# Patient Record
Sex: FEMALE | Race: WHITE | NOT HISPANIC OR LATINO | Employment: OTHER | URBAN - METROPOLITAN AREA
[De-identification: names, ages, dates, MRNs, and addresses within clinical notes are randomized per-mention and may not be internally consistent; named-entity substitution may affect disease eponyms.]

---

## 2017-01-09 ENCOUNTER — COMMUNICATION - HEALTHEAST (OUTPATIENT)
Dept: ADMINISTRATIVE | Facility: CLINIC | Age: 72
End: 2017-01-09

## 2017-01-10 ENCOUNTER — AMBULATORY - HEALTHEAST (OUTPATIENT)
Dept: OTOLARYNGOLOGY | Facility: CLINIC | Age: 72
End: 2017-01-10

## 2017-01-10 DIAGNOSIS — R42 VERTIGO: ICD-10-CM

## 2017-01-18 ENCOUNTER — RECORDS - HEALTHEAST (OUTPATIENT)
Dept: BONE DENSITY | Facility: CLINIC | Age: 72
End: 2017-01-18

## 2017-01-18 ENCOUNTER — RECORDS - HEALTHEAST (OUTPATIENT)
Dept: ADMINISTRATIVE | Facility: OTHER | Age: 72
End: 2017-01-18

## 2017-01-18 DIAGNOSIS — Z13.820 ENCOUNTER FOR SCREENING FOR OSTEOPOROSIS: ICD-10-CM

## 2017-03-23 ENCOUNTER — COMMUNICATION - HEALTHEAST (OUTPATIENT)
Dept: OTOLARYNGOLOGY | Facility: CLINIC | Age: 72
End: 2017-03-23

## 2017-03-23 DIAGNOSIS — R42 VERTIGO: ICD-10-CM

## 2017-04-25 ENCOUNTER — COMMUNICATION - HEALTHEAST (OUTPATIENT)
Dept: OTOLARYNGOLOGY | Facility: CLINIC | Age: 72
End: 2017-04-25

## 2017-04-25 DIAGNOSIS — R42 VERTIGO: ICD-10-CM

## 2017-06-07 ENCOUNTER — RECORDS - HEALTHEAST (OUTPATIENT)
Dept: ADMINISTRATIVE | Facility: OTHER | Age: 72
End: 2017-06-07

## 2017-06-08 ENCOUNTER — AMBULATORY - HEALTHEAST (OUTPATIENT)
Dept: ADMINISTRATIVE | Facility: REHABILITATION | Age: 72
End: 2017-06-08

## 2017-06-08 DIAGNOSIS — R42 DIZZINESS: ICD-10-CM

## 2017-06-19 ENCOUNTER — OFFICE VISIT - HEALTHEAST (OUTPATIENT)
Dept: PHYSICAL THERAPY | Facility: REHABILITATION | Age: 72
End: 2017-06-19

## 2017-06-19 ENCOUNTER — COMMUNICATION - HEALTHEAST (OUTPATIENT)
Dept: TELEHEALTH | Facility: CLINIC | Age: 72
End: 2017-06-19

## 2017-06-19 DIAGNOSIS — H81.10 BPPV (BENIGN PAROXYSMAL POSITIONAL VERTIGO), UNSPECIFIED LATERALITY: ICD-10-CM

## 2017-06-19 DIAGNOSIS — R42 DIZZINESS: ICD-10-CM

## 2017-06-19 DIAGNOSIS — R26.81 UNSTEADY GAIT: ICD-10-CM

## 2017-06-20 ENCOUNTER — COMMUNICATION - HEALTHEAST (OUTPATIENT)
Dept: FAMILY MEDICINE | Facility: CLINIC | Age: 72
End: 2017-06-20

## 2017-08-01 ENCOUNTER — RECORDS - HEALTHEAST (OUTPATIENT)
Dept: ADMINISTRATIVE | Facility: OTHER | Age: 72
End: 2017-08-01

## 2017-08-02 ENCOUNTER — COMMUNICATION - HEALTHEAST (OUTPATIENT)
Dept: FAMILY MEDICINE | Facility: CLINIC | Age: 72
End: 2017-08-02

## 2017-08-02 ENCOUNTER — OFFICE VISIT - HEALTHEAST (OUTPATIENT)
Dept: FAMILY MEDICINE | Facility: CLINIC | Age: 72
End: 2017-08-02

## 2017-08-02 DIAGNOSIS — M25.571 RIGHT ANKLE PAIN: ICD-10-CM

## 2017-08-02 DIAGNOSIS — F41.9 ANXIETY: ICD-10-CM

## 2017-08-02 DIAGNOSIS — Z00.00 WELLNESS EXAMINATION: ICD-10-CM

## 2017-08-02 DIAGNOSIS — E78.5 DYSLIPIDEMIA: ICD-10-CM

## 2017-08-02 DIAGNOSIS — R06.09 EXERTIONAL DYSPNEA: ICD-10-CM

## 2017-08-02 DIAGNOSIS — F39 MOOD DISORDER (H): ICD-10-CM

## 2017-08-02 LAB
CHOLEST SERPL-MCNC: 205 MG/DL
FASTING STATUS PATIENT QL REPORTED: YES
HDLC SERPL-MCNC: 43 MG/DL
LDLC SERPL CALC-MCNC: 116 MG/DL
TRIGL SERPL-MCNC: 231 MG/DL

## 2017-08-02 ASSESSMENT — MIFFLIN-ST. JEOR: SCORE: 1116.17

## 2017-10-23 ENCOUNTER — COMMUNICATION - HEALTHEAST (OUTPATIENT)
Dept: FAMILY MEDICINE | Facility: CLINIC | Age: 72
End: 2017-10-23

## 2017-11-09 ENCOUNTER — AMBULATORY - HEALTHEAST (OUTPATIENT)
Dept: NURSING | Facility: CLINIC | Age: 72
End: 2017-11-09

## 2017-11-09 DIAGNOSIS — Z23 NEED FOR IMMUNIZATION AGAINST INFLUENZA: ICD-10-CM

## 2017-11-23 ENCOUNTER — AMBULATORY - HEALTHEAST (OUTPATIENT)
Dept: FAMILY MEDICINE | Facility: CLINIC | Age: 72
End: 2017-11-23

## 2017-11-23 DIAGNOSIS — Z12.31 VISIT FOR SCREENING MAMMOGRAM: ICD-10-CM

## 2017-12-19 ENCOUNTER — HOSPITAL ENCOUNTER (OUTPATIENT)
Dept: MAMMOGRAPHY | Facility: CLINIC | Age: 72
Discharge: HOME | End: 2017-12-19
Attending: FAMILY MEDICINE

## 2017-12-19 DIAGNOSIS — Z12.31 VISIT FOR SCREENING MAMMOGRAM: ICD-10-CM

## 2018-04-09 ENCOUNTER — OFFICE VISIT - HEALTHEAST (OUTPATIENT)
Dept: FAMILY MEDICINE | Facility: CLINIC | Age: 73
End: 2018-04-09

## 2018-04-09 DIAGNOSIS — R07.0 THROAT PAIN: ICD-10-CM

## 2018-04-09 DIAGNOSIS — R03.0 ELEVATED BLOOD PRESSURE READING: ICD-10-CM

## 2018-04-09 LAB — DEPRECATED S PYO AG THROAT QL EIA: NORMAL

## 2018-04-10 LAB — GROUP A STREP BY PCR: NORMAL

## 2018-05-15 ENCOUNTER — OFFICE VISIT - HEALTHEAST (OUTPATIENT)
Dept: OTOLARYNGOLOGY | Facility: CLINIC | Age: 73
End: 2018-05-15

## 2018-05-15 DIAGNOSIS — R42 VERTIGO: ICD-10-CM

## 2018-05-22 ENCOUNTER — OFFICE VISIT - HEALTHEAST (OUTPATIENT)
Dept: OTOLARYNGOLOGY | Facility: CLINIC | Age: 73
End: 2018-05-22

## 2018-05-22 ENCOUNTER — OFFICE VISIT - HEALTHEAST (OUTPATIENT)
Dept: AUDIOLOGY | Facility: CLINIC | Age: 73
End: 2018-05-22

## 2018-05-22 DIAGNOSIS — H90.5 SENSORINEURAL HEARING LOSS, UNILATERAL: ICD-10-CM

## 2018-05-22 DIAGNOSIS — H90.3 ASNHL (ASYMMETRICAL SENSORINEURAL HEARING LOSS): ICD-10-CM

## 2018-05-22 DIAGNOSIS — H90.8 MIXED HEARING LOSS, UNILATERAL: ICD-10-CM

## 2018-05-22 DIAGNOSIS — R42 DIZZINESS AND GIDDINESS: ICD-10-CM

## 2018-05-22 DIAGNOSIS — H81.12 BPPV (BENIGN PAROXYSMAL POSITIONAL VERTIGO), LEFT: ICD-10-CM

## 2018-05-29 ENCOUNTER — OFFICE VISIT - HEALTHEAST (OUTPATIENT)
Dept: OCCUPATIONAL THERAPY | Facility: REHABILITATION | Age: 73
End: 2018-05-29

## 2018-05-29 DIAGNOSIS — R42 VERTIGO: ICD-10-CM

## 2018-05-29 DIAGNOSIS — H81.12 BPPV (BENIGN PAROXYSMAL POSITIONAL VERTIGO), LEFT: ICD-10-CM

## 2018-05-29 DIAGNOSIS — R26.81 UNSTEADINESS ON FEET: ICD-10-CM

## 2018-06-01 ENCOUNTER — OFFICE VISIT - HEALTHEAST (OUTPATIENT)
Dept: OCCUPATIONAL THERAPY | Facility: REHABILITATION | Age: 73
End: 2018-06-01

## 2018-06-01 ENCOUNTER — OFFICE VISIT - HEALTHEAST (OUTPATIENT)
Dept: FAMILY MEDICINE | Facility: CLINIC | Age: 73
End: 2018-06-01

## 2018-06-01 DIAGNOSIS — R26.81 UNSTEADINESS ON FEET: ICD-10-CM

## 2018-06-01 DIAGNOSIS — H81.12 BPPV (BENIGN PAROXYSMAL POSITIONAL VERTIGO), LEFT: ICD-10-CM

## 2018-06-01 DIAGNOSIS — R42 VERTIGO: ICD-10-CM

## 2018-06-01 DIAGNOSIS — J02.9 SORE THROAT: ICD-10-CM

## 2018-06-01 LAB — DEPRECATED S PYO AG THROAT QL EIA: NORMAL

## 2018-06-02 LAB — GROUP A STREP BY PCR: NORMAL

## 2018-06-12 ENCOUNTER — OFFICE VISIT - HEALTHEAST (OUTPATIENT)
Dept: AUDIOLOGY | Facility: CLINIC | Age: 73
End: 2018-06-12

## 2018-06-12 DIAGNOSIS — H90.A32 MIXED CONDUCTIVE AND SENSORINEURAL HEARING LOSS OF LEFT EAR WITH RESTRICTED HEARING OF RIGHT EAR: ICD-10-CM

## 2018-06-12 DIAGNOSIS — H90.A21 SENSORINEURAL HEARING LOSS (SNHL) OF RIGHT EAR WITH RESTRICTED HEARING OF LEFT EAR: ICD-10-CM

## 2018-06-13 ENCOUNTER — COMMUNICATION - HEALTHEAST (OUTPATIENT)
Dept: ADMINISTRATIVE | Facility: CLINIC | Age: 73
End: 2018-06-13

## 2018-09-18 ENCOUNTER — COMMUNICATION - HEALTHEAST (OUTPATIENT)
Dept: FAMILY MEDICINE | Facility: CLINIC | Age: 73
End: 2018-09-18

## 2018-09-18 DIAGNOSIS — F39 MOOD DISORDER (H): ICD-10-CM

## 2018-09-18 DIAGNOSIS — E78.5 DYSLIPIDEMIA: ICD-10-CM

## 2018-09-18 DIAGNOSIS — F41.9 ANXIETY: ICD-10-CM

## 2018-09-25 ENCOUNTER — RECORDS - HEALTHEAST (OUTPATIENT)
Dept: ADMINISTRATIVE | Facility: OTHER | Age: 73
End: 2018-09-25

## 2018-10-08 ENCOUNTER — AMBULATORY - HEALTHEAST (OUTPATIENT)
Dept: NURSING | Facility: CLINIC | Age: 73
End: 2018-10-08

## 2018-10-08 DIAGNOSIS — Z23 NEED FOR IMMUNIZATION AGAINST INFLUENZA: ICD-10-CM

## 2019-01-31 ENCOUNTER — OFFICE VISIT - HEALTHEAST (OUTPATIENT)
Dept: FAMILY MEDICINE | Facility: CLINIC | Age: 74
End: 2019-01-31

## 2019-01-31 DIAGNOSIS — I10 ESSENTIAL HYPERTENSION: ICD-10-CM

## 2019-01-31 DIAGNOSIS — E78.5 DYSLIPIDEMIA: ICD-10-CM

## 2019-01-31 DIAGNOSIS — R73.09 ELEVATED GLUCOSE: ICD-10-CM

## 2019-01-31 DIAGNOSIS — R04.0 BLOODY NOSE: ICD-10-CM

## 2019-01-31 LAB
ALBUMIN SERPL-MCNC: 4.1 G/DL (ref 3.5–5)
ALP SERPL-CCNC: 73 U/L (ref 45–120)
ALT SERPL W P-5'-P-CCNC: 24 U/L (ref 0–45)
ANION GAP SERPL CALCULATED.3IONS-SCNC: 12 MMOL/L (ref 5–18)
AST SERPL W P-5'-P-CCNC: 20 U/L (ref 0–40)
BASOPHILS # BLD AUTO: 0 THOU/UL (ref 0–0.2)
BASOPHILS NFR BLD AUTO: 1 % (ref 0–2)
BILIRUB SERPL-MCNC: 0.7 MG/DL (ref 0–1)
BUN SERPL-MCNC: 15 MG/DL (ref 8–28)
CALCIUM SERPL-MCNC: 9.4 MG/DL (ref 8.5–10.5)
CHLORIDE BLD-SCNC: 106 MMOL/L (ref 98–107)
CHOLEST SERPL-MCNC: 241 MG/DL
CO2 SERPL-SCNC: 23 MMOL/L (ref 22–31)
CREAT SERPL-MCNC: 0.78 MG/DL (ref 0.6–1.1)
EOSINOPHIL # BLD AUTO: 0.1 THOU/UL (ref 0–0.4)
EOSINOPHIL NFR BLD AUTO: 2 % (ref 0–6)
ERYTHROCYTE [DISTWIDTH] IN BLOOD BY AUTOMATED COUNT: 11.9 % (ref 11–14.5)
FASTING STATUS PATIENT QL REPORTED: YES
GFR SERPL CREATININE-BSD FRML MDRD: >60 ML/MIN/1.73M2
GLUCOSE BLD-MCNC: 99 MG/DL (ref 70–125)
HBA1C MFR BLD: 5 % (ref 3.5–6.1)
HCT VFR BLD AUTO: 39.6 % (ref 35–47)
HDLC SERPL-MCNC: 61 MG/DL
HGB BLD-MCNC: 13.4 G/DL (ref 12–16)
LDLC SERPL CALC-MCNC: 144 MG/DL
LYMPHOCYTES # BLD AUTO: 1.8 THOU/UL (ref 0.8–4.4)
LYMPHOCYTES NFR BLD AUTO: 35 % (ref 20–40)
MCH RBC QN AUTO: 32.4 PG (ref 27–34)
MCHC RBC AUTO-ENTMCNC: 33.8 G/DL (ref 32–36)
MCV RBC AUTO: 96 FL (ref 80–100)
MONOCYTES # BLD AUTO: 0.3 THOU/UL (ref 0–0.9)
MONOCYTES NFR BLD AUTO: 6 % (ref 2–10)
NEUTROPHILS # BLD AUTO: 3 THOU/UL (ref 2–7.7)
NEUTROPHILS NFR BLD AUTO: 57 % (ref 50–70)
PLATELET # BLD AUTO: 260 THOU/UL (ref 140–440)
PMV BLD AUTO: 6.9 FL (ref 7–10)
POTASSIUM BLD-SCNC: 4 MMOL/L (ref 3.5–5)
PROT SERPL-MCNC: 6.7 G/DL (ref 6–8)
RBC # BLD AUTO: 4.13 MILL/UL (ref 3.8–5.4)
SODIUM SERPL-SCNC: 141 MMOL/L (ref 136–145)
TRIGL SERPL-MCNC: 180 MG/DL
WBC: 5.3 THOU/UL (ref 4–11)

## 2019-02-04 ENCOUNTER — COMMUNICATION - HEALTHEAST (OUTPATIENT)
Dept: FAMILY MEDICINE | Facility: CLINIC | Age: 74
End: 2019-02-04

## 2019-02-18 ENCOUNTER — OFFICE VISIT - HEALTHEAST (OUTPATIENT)
Dept: FAMILY MEDICINE | Facility: CLINIC | Age: 74
End: 2019-02-18

## 2019-02-18 DIAGNOSIS — I10 ESSENTIAL HYPERTENSION: ICD-10-CM

## 2019-02-26 ENCOUNTER — OFFICE VISIT - HEALTHEAST (OUTPATIENT)
Dept: FAMILY MEDICINE | Facility: CLINIC | Age: 74
End: 2019-02-26

## 2019-02-26 ENCOUNTER — COMMUNICATION - HEALTHEAST (OUTPATIENT)
Dept: SCHEDULING | Facility: CLINIC | Age: 74
End: 2019-02-26

## 2019-02-26 DIAGNOSIS — E78.5 DYSLIPIDEMIA: ICD-10-CM

## 2019-02-26 DIAGNOSIS — I10 ESSENTIAL HYPERTENSION: ICD-10-CM

## 2019-02-26 DIAGNOSIS — R05.9 COUGH: ICD-10-CM

## 2019-02-26 LAB
ANION GAP SERPL CALCULATED.3IONS-SCNC: 13 MMOL/L (ref 5–18)
BUN SERPL-MCNC: 14 MG/DL (ref 8–28)
CALCIUM SERPL-MCNC: 9.5 MG/DL (ref 8.5–10.5)
CHLORIDE BLD-SCNC: 105 MMOL/L (ref 98–107)
CHOLEST SERPL-MCNC: 226 MG/DL
CO2 SERPL-SCNC: 22 MMOL/L (ref 22–31)
CREAT SERPL-MCNC: 0.87 MG/DL (ref 0.6–1.1)
FASTING STATUS PATIENT QL REPORTED: YES
GFR SERPL CREATININE-BSD FRML MDRD: >60 ML/MIN/1.73M2
GLUCOSE BLD-MCNC: 109 MG/DL (ref 70–125)
HDLC SERPL-MCNC: 50 MG/DL
LDLC SERPL CALC-MCNC: 126 MG/DL
POTASSIUM BLD-SCNC: 3.5 MMOL/L (ref 3.5–5)
SODIUM SERPL-SCNC: 140 MMOL/L (ref 136–145)
TRIGL SERPL-MCNC: 249 MG/DL

## 2019-02-27 ENCOUNTER — COMMUNICATION - HEALTHEAST (OUTPATIENT)
Dept: FAMILY MEDICINE | Facility: CLINIC | Age: 74
End: 2019-02-27

## 2019-03-04 ENCOUNTER — OFFICE VISIT - HEALTHEAST (OUTPATIENT)
Dept: FAMILY MEDICINE | Facility: CLINIC | Age: 74
End: 2019-03-04

## 2019-03-04 DIAGNOSIS — R41.0 CONFUSION: ICD-10-CM

## 2019-03-04 DIAGNOSIS — I10 ESSENTIAL HYPERTENSION: ICD-10-CM

## 2019-03-04 DIAGNOSIS — R43.9 DECREASED TASTE AND SMELL: ICD-10-CM

## 2019-03-08 ENCOUNTER — HOSPITAL ENCOUNTER (OUTPATIENT)
Dept: MRI IMAGING | Facility: CLINIC | Age: 74
Discharge: HOME | End: 2019-03-08
Attending: FAMILY MEDICINE

## 2019-03-08 DIAGNOSIS — R41.0 CONFUSION: ICD-10-CM

## 2019-03-08 DIAGNOSIS — R43.9 DECREASED TASTE AND SMELL: ICD-10-CM

## 2019-04-03 ENCOUNTER — COMMUNICATION - HEALTHEAST (OUTPATIENT)
Dept: FAMILY MEDICINE | Facility: CLINIC | Age: 74
End: 2019-04-03

## 2019-04-03 DIAGNOSIS — I10 ESSENTIAL HYPERTENSION: ICD-10-CM

## 2019-04-03 DIAGNOSIS — F39 MOOD DISORDER (H): ICD-10-CM

## 2019-04-03 DIAGNOSIS — F41.9 ANXIETY: ICD-10-CM

## 2019-04-03 DIAGNOSIS — E78.5 DYSLIPIDEMIA: ICD-10-CM

## 2019-04-04 ENCOUNTER — COMMUNICATION - HEALTHEAST (OUTPATIENT)
Dept: FAMILY MEDICINE | Facility: CLINIC | Age: 74
End: 2019-04-04

## 2019-04-04 DIAGNOSIS — I10 ESSENTIAL HYPERTENSION: ICD-10-CM

## 2019-06-30 ENCOUNTER — COMMUNICATION - HEALTHEAST (OUTPATIENT)
Dept: FAMILY MEDICINE | Facility: CLINIC | Age: 74
End: 2019-06-30

## 2019-06-30 DIAGNOSIS — I10 ESSENTIAL HYPERTENSION: ICD-10-CM

## 2019-07-01 ENCOUNTER — COMMUNICATION - HEALTHEAST (OUTPATIENT)
Dept: FAMILY MEDICINE | Facility: CLINIC | Age: 74
End: 2019-07-01

## 2019-07-01 DIAGNOSIS — F39 MOOD DISORDER (H): ICD-10-CM

## 2019-07-01 DIAGNOSIS — E78.5 DYSLIPIDEMIA: ICD-10-CM

## 2019-07-01 DIAGNOSIS — F41.9 ANXIETY: ICD-10-CM

## 2019-10-18 ENCOUNTER — AMBULATORY - HEALTHEAST (OUTPATIENT)
Dept: NURSING | Facility: CLINIC | Age: 74
End: 2019-10-18

## 2019-10-18 DIAGNOSIS — Z23 FLU VACCINE NEED: ICD-10-CM

## 2019-11-18 ENCOUNTER — RECORDS - HEALTHEAST (OUTPATIENT)
Dept: GENERAL RADIOLOGY | Facility: CLINIC | Age: 74
End: 2019-11-18

## 2019-11-18 ENCOUNTER — OFFICE VISIT - HEALTHEAST (OUTPATIENT)
Dept: FAMILY MEDICINE | Facility: CLINIC | Age: 74
End: 2019-11-18

## 2019-11-18 DIAGNOSIS — M79.671 PAIN OF RIGHT HEEL: ICD-10-CM

## 2019-11-18 DIAGNOSIS — Z12.31 VISIT FOR SCREENING MAMMOGRAM: ICD-10-CM

## 2019-11-18 DIAGNOSIS — M25.512 ACUTE PAIN OF LEFT SHOULDER: ICD-10-CM

## 2019-11-18 DIAGNOSIS — I10 ESSENTIAL HYPERTENSION: ICD-10-CM

## 2019-11-18 DIAGNOSIS — M79.671 PAIN IN RIGHT FOOT: ICD-10-CM

## 2019-11-18 DIAGNOSIS — F39 MOOD DISORDER (H): ICD-10-CM

## 2019-11-18 ASSESSMENT — MIFFLIN-ST. JEOR: SCORE: 1124.68

## 2019-11-19 ENCOUNTER — COMMUNICATION - HEALTHEAST (OUTPATIENT)
Dept: FAMILY MEDICINE | Facility: CLINIC | Age: 74
End: 2019-11-19

## 2019-11-25 ENCOUNTER — OFFICE VISIT - HEALTHEAST (OUTPATIENT)
Dept: PODIATRY | Facility: CLINIC | Age: 74
End: 2019-11-25

## 2019-11-25 DIAGNOSIS — M24.573 EQUINUS CONTRACTURE OF ANKLE: ICD-10-CM

## 2019-11-25 DIAGNOSIS — M72.2 PLANTAR FASCIITIS: ICD-10-CM

## 2019-12-03 ENCOUNTER — OFFICE VISIT - HEALTHEAST (OUTPATIENT)
Dept: PHYSICAL THERAPY | Facility: REHABILITATION | Age: 74
End: 2019-12-03

## 2019-12-03 DIAGNOSIS — M75.42 IMPINGEMENT SYNDROME OF LEFT SHOULDER: ICD-10-CM

## 2019-12-03 DIAGNOSIS — M53.82 DECREASED ROM OF INTERVERTEBRAL DISCS OF CERVICAL SPINE: ICD-10-CM

## 2019-12-03 DIAGNOSIS — M25.612 DECREASED ROM OF LEFT SHOULDER: ICD-10-CM

## 2019-12-03 DIAGNOSIS — M25.512 ACUTE PAIN OF LEFT SHOULDER: ICD-10-CM

## 2019-12-05 ENCOUNTER — OFFICE VISIT - HEALTHEAST (OUTPATIENT)
Dept: PHYSICAL THERAPY | Facility: REHABILITATION | Age: 74
End: 2019-12-05

## 2019-12-05 DIAGNOSIS — M53.82 DECREASED ROM OF INTERVERTEBRAL DISCS OF CERVICAL SPINE: ICD-10-CM

## 2019-12-05 DIAGNOSIS — M25.512 ACUTE PAIN OF LEFT SHOULDER: ICD-10-CM

## 2019-12-05 DIAGNOSIS — M75.42 IMPINGEMENT SYNDROME OF LEFT SHOULDER: ICD-10-CM

## 2019-12-05 DIAGNOSIS — M25.612 DECREASED ROM OF LEFT SHOULDER: ICD-10-CM

## 2019-12-17 ENCOUNTER — OFFICE VISIT - HEALTHEAST (OUTPATIENT)
Dept: PHYSICAL THERAPY | Facility: REHABILITATION | Age: 74
End: 2019-12-17

## 2019-12-17 DIAGNOSIS — M53.82 DECREASED ROM OF INTERVERTEBRAL DISCS OF CERVICAL SPINE: ICD-10-CM

## 2019-12-17 DIAGNOSIS — M25.512 ACUTE PAIN OF LEFT SHOULDER: ICD-10-CM

## 2019-12-17 DIAGNOSIS — M75.42 IMPINGEMENT SYNDROME OF LEFT SHOULDER: ICD-10-CM

## 2019-12-17 DIAGNOSIS — M25.612 DECREASED ROM OF LEFT SHOULDER: ICD-10-CM

## 2019-12-19 ENCOUNTER — OFFICE VISIT - HEALTHEAST (OUTPATIENT)
Dept: PHYSICAL THERAPY | Facility: REHABILITATION | Age: 74
End: 2019-12-19

## 2019-12-19 DIAGNOSIS — M25.512 ACUTE PAIN OF LEFT SHOULDER: ICD-10-CM

## 2019-12-19 DIAGNOSIS — M25.612 DECREASED ROM OF LEFT SHOULDER: ICD-10-CM

## 2019-12-19 DIAGNOSIS — M53.82 DECREASED ROM OF INTERVERTEBRAL DISCS OF CERVICAL SPINE: ICD-10-CM

## 2019-12-19 DIAGNOSIS — M75.42 IMPINGEMENT SYNDROME OF LEFT SHOULDER: ICD-10-CM

## 2019-12-26 ENCOUNTER — OFFICE VISIT - HEALTHEAST (OUTPATIENT)
Dept: PHYSICAL THERAPY | Facility: REHABILITATION | Age: 74
End: 2019-12-26

## 2019-12-26 DIAGNOSIS — M53.82 DECREASED ROM OF INTERVERTEBRAL DISCS OF CERVICAL SPINE: ICD-10-CM

## 2019-12-26 DIAGNOSIS — M25.612 DECREASED ROM OF LEFT SHOULDER: ICD-10-CM

## 2019-12-26 DIAGNOSIS — M25.512 ACUTE PAIN OF LEFT SHOULDER: ICD-10-CM

## 2019-12-26 DIAGNOSIS — M75.42 IMPINGEMENT SYNDROME OF LEFT SHOULDER: ICD-10-CM

## 2019-12-30 ENCOUNTER — COMMUNICATION - HEALTHEAST (OUTPATIENT)
Dept: FAMILY MEDICINE | Facility: CLINIC | Age: 74
End: 2019-12-30

## 2019-12-30 DIAGNOSIS — I10 ESSENTIAL HYPERTENSION: ICD-10-CM

## 2020-01-02 ENCOUNTER — OFFICE VISIT - HEALTHEAST (OUTPATIENT)
Dept: PHYSICAL THERAPY | Facility: REHABILITATION | Age: 75
End: 2020-01-02

## 2020-01-02 DIAGNOSIS — M75.42 IMPINGEMENT SYNDROME OF LEFT SHOULDER: ICD-10-CM

## 2020-01-02 DIAGNOSIS — M53.82 DECREASED ROM OF INTERVERTEBRAL DISCS OF CERVICAL SPINE: ICD-10-CM

## 2020-01-02 DIAGNOSIS — M25.512 ACUTE PAIN OF LEFT SHOULDER: ICD-10-CM

## 2020-01-02 DIAGNOSIS — M25.612 DECREASED ROM OF LEFT SHOULDER: ICD-10-CM

## 2020-01-07 ENCOUNTER — OFFICE VISIT - HEALTHEAST (OUTPATIENT)
Dept: OTOLARYNGOLOGY | Facility: CLINIC | Age: 75
End: 2020-01-07

## 2020-01-07 DIAGNOSIS — T16.2XXA FOREIGN BODY OF LEFT EAR, INITIAL ENCOUNTER: ICD-10-CM

## 2020-01-10 ENCOUNTER — OFFICE VISIT - HEALTHEAST (OUTPATIENT)
Dept: PHYSICAL THERAPY | Facility: REHABILITATION | Age: 75
End: 2020-01-10

## 2020-01-10 DIAGNOSIS — M25.612 DECREASED ROM OF LEFT SHOULDER: ICD-10-CM

## 2020-01-10 DIAGNOSIS — M25.512 ACUTE PAIN OF LEFT SHOULDER: ICD-10-CM

## 2020-01-10 DIAGNOSIS — M53.82 DECREASED ROM OF INTERVERTEBRAL DISCS OF CERVICAL SPINE: ICD-10-CM

## 2020-01-10 DIAGNOSIS — M75.42 IMPINGEMENT SYNDROME OF LEFT SHOULDER: ICD-10-CM

## 2020-01-17 ENCOUNTER — HOSPITAL ENCOUNTER (OUTPATIENT)
Dept: MAMMOGRAPHY | Facility: CLINIC | Age: 75
Discharge: HOME | End: 2020-01-17
Attending: FAMILY MEDICINE

## 2020-01-17 ENCOUNTER — COMMUNICATION - HEALTHEAST (OUTPATIENT)
Dept: TELEHEALTH | Facility: CLINIC | Age: 75
End: 2020-01-17

## 2020-01-17 DIAGNOSIS — Z12.31 VISIT FOR SCREENING MAMMOGRAM: ICD-10-CM

## 2020-01-24 ENCOUNTER — OFFICE VISIT - HEALTHEAST (OUTPATIENT)
Dept: PHYSICAL THERAPY | Facility: REHABILITATION | Age: 75
End: 2020-01-24

## 2020-01-24 DIAGNOSIS — M25.612 DECREASED ROM OF LEFT SHOULDER: ICD-10-CM

## 2020-01-24 DIAGNOSIS — M75.42 IMPINGEMENT SYNDROME OF LEFT SHOULDER: ICD-10-CM

## 2020-01-24 DIAGNOSIS — M53.82 DECREASED ROM OF INTERVERTEBRAL DISCS OF CERVICAL SPINE: ICD-10-CM

## 2020-01-24 DIAGNOSIS — M25.512 ACUTE PAIN OF LEFT SHOULDER: ICD-10-CM

## 2020-02-19 ENCOUNTER — RECORDS - HEALTHEAST (OUTPATIENT)
Dept: ADMINISTRATIVE | Facility: OTHER | Age: 75
End: 2020-02-19

## 2020-02-26 ENCOUNTER — OFFICE VISIT - HEALTHEAST (OUTPATIENT)
Dept: FAMILY MEDICINE | Facility: CLINIC | Age: 75
End: 2020-02-26

## 2020-02-26 DIAGNOSIS — R05.9 COUGH: ICD-10-CM

## 2020-02-26 DIAGNOSIS — F39 MOOD DISORDER (H): ICD-10-CM

## 2020-02-26 DIAGNOSIS — I50.9 CONGESTIVE HEART FAILURE, UNSPECIFIED HF CHRONICITY, UNSPECIFIED HEART FAILURE TYPE (H): ICD-10-CM

## 2020-04-21 ENCOUNTER — COMMUNICATION - HEALTHEAST (OUTPATIENT)
Dept: FAMILY MEDICINE | Facility: CLINIC | Age: 75
End: 2020-04-21

## 2020-04-21 DIAGNOSIS — I10 ESSENTIAL HYPERTENSION: ICD-10-CM

## 2020-05-26 ENCOUNTER — COMMUNICATION - HEALTHEAST (OUTPATIENT)
Dept: FAMILY MEDICINE | Facility: CLINIC | Age: 75
End: 2020-05-26

## 2020-05-26 DIAGNOSIS — I10 ESSENTIAL HYPERTENSION: ICD-10-CM

## 2020-06-21 ENCOUNTER — COMMUNICATION - HEALTHEAST (OUTPATIENT)
Dept: FAMILY MEDICINE | Facility: CLINIC | Age: 75
End: 2020-06-21

## 2020-06-21 DIAGNOSIS — E78.5 DYSLIPIDEMIA: ICD-10-CM

## 2020-06-21 DIAGNOSIS — F41.9 ANXIETY: ICD-10-CM

## 2020-06-21 DIAGNOSIS — F39 MOOD DISORDER (H): ICD-10-CM

## 2020-08-07 ENCOUNTER — COMMUNICATION - HEALTHEAST (OUTPATIENT)
Dept: FAMILY MEDICINE | Facility: CLINIC | Age: 75
End: 2020-08-07

## 2020-08-07 DIAGNOSIS — I10 ESSENTIAL HYPERTENSION: ICD-10-CM

## 2020-08-23 ENCOUNTER — COMMUNICATION - HEALTHEAST (OUTPATIENT)
Dept: FAMILY MEDICINE | Facility: CLINIC | Age: 75
End: 2020-08-23

## 2020-08-23 DIAGNOSIS — I10 ESSENTIAL HYPERTENSION: ICD-10-CM

## 2020-08-27 ENCOUNTER — COMMUNICATION - HEALTHEAST (OUTPATIENT)
Dept: FAMILY MEDICINE | Facility: CLINIC | Age: 75
End: 2020-08-27

## 2020-08-27 DIAGNOSIS — K64.4 EXTERNAL HEMORRHOIDS: ICD-10-CM

## 2020-08-28 ENCOUNTER — RECORDS - HEALTHEAST (OUTPATIENT)
Dept: ADMINISTRATIVE | Facility: OTHER | Age: 75
End: 2020-08-28

## 2020-08-30 ENCOUNTER — NURSE TRIAGE (OUTPATIENT)
Dept: NURSING | Facility: CLINIC | Age: 75
End: 2020-08-30

## 2020-08-30 ENCOUNTER — VIRTUAL VISIT (OUTPATIENT)
Dept: URGENT CARE | Facility: CLINIC | Age: 75
End: 2020-08-30
Payer: COMMERCIAL

## 2020-08-30 ENCOUNTER — COMMUNICATION - HEALTHEAST (OUTPATIENT)
Dept: SCHEDULING | Facility: CLINIC | Age: 75
End: 2020-08-30

## 2020-08-30 DIAGNOSIS — R50.9 FEBRILE ILLNESS: Primary | ICD-10-CM

## 2020-08-30 PROBLEM — I10 ESSENTIAL HYPERTENSION: Status: ACTIVE | Noted: 2019-03-04

## 2020-08-30 PROBLEM — D12.6 BENIGN NEOPLASM OF COLON: Status: ACTIVE | Noted: 2020-08-30

## 2020-08-30 PROCEDURE — 99203 OFFICE O/P NEW LOW 30 MIN: CPT | Mod: 95 | Performed by: FAMILY MEDICINE

## 2020-08-30 RX ORDER — CITALOPRAM HYDROBROMIDE 20 MG/1
TABLET ORAL
COMMUNITY
Start: 2019-12-31 | End: 2022-05-10

## 2020-08-30 RX ORDER — ATORVASTATIN CALCIUM 20 MG/1
TABLET, FILM COATED ORAL
COMMUNITY
Start: 2019-04-04 | End: 2022-05-10

## 2020-08-30 RX ORDER — LOSARTAN POTASSIUM 25 MG/1
TABLET ORAL
COMMUNITY
Start: 2020-08-25 | End: 2022-05-10

## 2020-08-30 RX ORDER — HYDROCHLOROTHIAZIDE 25 MG/1
TABLET ORAL
COMMUNITY
Start: 2020-08-10 | End: 2022-05-10

## 2020-08-30 NOTE — PROGRESS NOTES
"The patient has been notified of following:     \"This telephone or video visit will be conducted via a call between you and your physician/provider. We have found that certain health care needs can be provided without the need for a physical exam.  This service lets us provide the care you need with a short phone conversation.  If a prescription is necessary we can send it directly to your pharmacy.  If lab work is needed we can place an order for that and you can then stop by our lab to have the test done at a later time.    Telephone or video visits are billed at different rates depending on your insurance coverage. During this emergency period, for some insurers they may be billed the same as an in-person visit.  Please reach out to your insurance provider with any questions.    Patient has given verbal consent for Telephone or video visit?  Yes  Subjective   HPI    Has chronic diarrhea and has seen a specialist for this 2 days ago.  Not new for her    Yesterday patient started developing fatigue  Patient then had a temp of 101.8  Patient also started having body aches   Slight headache   Chest Pain or shortness of breath: No  Orthopnea, Edema, PND: No  Abdominal Pain: No  Urinary symptoms or Flank Pain: No  Focal numbness or weakness: No  Rash: No  Melena/Hematochezia/Hematemesis: No  Concern about recent tick-bite: No  Concern about recent travel/possible exposure: No    Problem list and histories reviewed & adjusted, as indicated.  Additional history: as documented    Problem list, Medication list, Allergies, and Medical/Social/Surgical histories reviewed in EPIC and updated as appropriate.    ROS:  Constitutional, HEENT, cardiovascular, pulmonary, GI, , musculoskeletal, neuro, skin, endocrine and psych systems are negative, except as otherwise noted.    OBJECTIVE:                                                    There were no vitals taken for this visit.  There is no height or weight on file to calculate " BMI.    PSYCH: Alert and oriented times 3; coherent speech, normal   rate and volume, able to articulate logical thoughts, able   to abstract reason, no tangential thoughts, no hallucinations   or delusions  Her affect is normal  RESP: No cough, no audible wheezing, able to talk in full sentences  Additional exam:  none  Remainder of exam unable to be completed due to telephone visits      Diagnostic Test Results:  none      ASSESSMENT/PLAN:                                                    No diagnosis found.      ICD-10-CM    1. Febrile illness  R50.9 Symptomatic COVID-19 Virus (Coronavirus) by PCR     She states she has been afebrile since this morning without the help of fever reducing medication.  Patient advised that he/she also has symptoms consistent with covid19  covid19 precautions advised  Patient referred for testing   Alarm signs or symptoms discussed, if present recommend go to ER   Patient instructions discussed with patient  Recommend follow up with primary care provider if no relief in 3 days, sooner if worse  Adverse reactions of medications discussed.  Aware to come back in if with worsening symptoms or if no relief despite treatment plan  Patient voiced understanding and had no further questions.     If with any symptoms of chest pain or shortness of breath, lightheadedness, palpitations, feeling like passing out or change and worsening in the quality of your symptoms, please proceed to ER. Recommend follow up with PCP in a few days for re-evaluation.       Brigida Bradshaw MD  Time 13 minutes telephone      VIRTUAL CARE PROVIDER  [facility] URGENT CARE

## 2020-08-30 NOTE — TELEPHONE ENCOUNTER
Note copied from HE chart for virtual appointment today:     Raised temperature of 99.5 at provider appointment on Friday.   She had diarrhea for a number of days.   Woke up Friday night with aches and pains.   Saturday the aches and pains were gone.   101.8 temperature on Saturday. Pt has had fatigue. Chills and sweats last night.   Slight headache today but pt states that is about it.     Per protocol recommendation is virtual visit with provider to discuss sx.     RN transferred to scheduling to make phone visit with urgent care provider tonight.     Patient stated understanding and agreed to plan.     Luanne Machado RN 08/30/20 5:59 PM  [facility] Nurse Advisor

## 2020-08-31 ENCOUNTER — AMBULATORY - HEALTHEAST (OUTPATIENT)
Dept: FAMILY MEDICINE | Facility: CLINIC | Age: 75
End: 2020-08-31

## 2020-08-31 DIAGNOSIS — R50.9 FEBRILE ILLNESS: ICD-10-CM

## 2020-09-02 ENCOUNTER — COMMUNICATION - HEALTHEAST (OUTPATIENT)
Dept: SCHEDULING | Facility: CLINIC | Age: 75
End: 2020-09-02

## 2020-11-09 ENCOUNTER — COMMUNICATION - HEALTHEAST (OUTPATIENT)
Dept: FAMILY MEDICINE | Facility: CLINIC | Age: 75
End: 2020-11-09

## 2020-11-09 DIAGNOSIS — I10 ESSENTIAL HYPERTENSION: ICD-10-CM

## 2020-12-20 ENCOUNTER — COMMUNICATION - HEALTHEAST (OUTPATIENT)
Dept: FAMILY MEDICINE | Facility: CLINIC | Age: 75
End: 2020-12-20

## 2020-12-20 DIAGNOSIS — I10 ESSENTIAL HYPERTENSION: ICD-10-CM

## 2020-12-30 ENCOUNTER — RECORDS - HEALTHEAST (OUTPATIENT)
Dept: ADMINISTRATIVE | Facility: OTHER | Age: 75
End: 2020-12-30

## 2021-01-08 ENCOUNTER — RECORDS - HEALTHEAST (OUTPATIENT)
Dept: ADMINISTRATIVE | Facility: OTHER | Age: 76
End: 2021-01-08

## 2021-02-16 ENCOUNTER — COMMUNICATION - HEALTHEAST (OUTPATIENT)
Dept: FAMILY MEDICINE | Facility: CLINIC | Age: 76
End: 2021-02-16

## 2021-02-16 DIAGNOSIS — I10 ESSENTIAL HYPERTENSION: ICD-10-CM

## 2021-03-05 ENCOUNTER — AMBULATORY - HEALTHEAST (OUTPATIENT)
Dept: NURSING | Facility: CLINIC | Age: 76
End: 2021-03-05

## 2021-03-17 ENCOUNTER — COMMUNICATION - HEALTHEAST (OUTPATIENT)
Dept: FAMILY MEDICINE | Facility: CLINIC | Age: 76
End: 2021-03-17

## 2021-03-17 DIAGNOSIS — I10 ESSENTIAL HYPERTENSION: ICD-10-CM

## 2021-03-26 ENCOUNTER — AMBULATORY - HEALTHEAST (OUTPATIENT)
Dept: NURSING | Facility: CLINIC | Age: 76
End: 2021-03-26

## 2021-03-28 ENCOUNTER — COMMUNICATION - HEALTHEAST (OUTPATIENT)
Dept: FAMILY MEDICINE | Facility: CLINIC | Age: 76
End: 2021-03-28

## 2021-03-28 DIAGNOSIS — E78.5 DYSLIPIDEMIA: ICD-10-CM

## 2021-03-30 ENCOUNTER — COMMUNICATION - HEALTHEAST (OUTPATIENT)
Dept: FAMILY MEDICINE | Facility: CLINIC | Age: 76
End: 2021-03-30

## 2021-03-30 DIAGNOSIS — F39 MOOD DISORDER (H): ICD-10-CM

## 2021-03-30 DIAGNOSIS — F41.9 ANXIETY: ICD-10-CM

## 2021-05-12 ENCOUNTER — OFFICE VISIT - HEALTHEAST (OUTPATIENT)
Dept: FAMILY MEDICINE | Facility: CLINIC | Age: 76
End: 2021-05-12

## 2021-05-12 DIAGNOSIS — F32.5 MAJOR DEPRESSION IN REMISSION (H): ICD-10-CM

## 2021-05-12 DIAGNOSIS — I10 ESSENTIAL HYPERTENSION: ICD-10-CM

## 2021-05-12 DIAGNOSIS — E78.5 DYSLIPIDEMIA: ICD-10-CM

## 2021-05-12 DIAGNOSIS — Z12.31 ENCOUNTER FOR SCREENING MAMMOGRAM FOR BREAST CANCER: ICD-10-CM

## 2021-05-12 DIAGNOSIS — Z00.00 ROUTINE GENERAL MEDICAL EXAMINATION AT A HEALTH CARE FACILITY: ICD-10-CM

## 2021-05-12 DIAGNOSIS — I45.9 SKIPPED HEART BEATS: ICD-10-CM

## 2021-05-12 DIAGNOSIS — L30.9 DERMATITIS: ICD-10-CM

## 2021-05-12 ASSESSMENT — ANXIETY QUESTIONNAIRES
5. BEING SO RESTLESS THAT IT IS HARD TO SIT STILL: NOT AT ALL
2. NOT BEING ABLE TO STOP OR CONTROL WORRYING: NOT AT ALL
7. FEELING AFRAID AS IF SOMETHING AWFUL MIGHT HAPPEN: NOT AT ALL
1. FEELING NERVOUS, ANXIOUS, OR ON EDGE: NOT AT ALL
IF YOU CHECKED OFF ANY PROBLEMS ON THIS QUESTIONNAIRE, HOW DIFFICULT HAVE THESE PROBLEMS MADE IT FOR YOU TO DO YOUR WORK, TAKE CARE OF THINGS AT HOME, OR GET ALONG WITH OTHER PEOPLE: NOT DIFFICULT AT ALL
4. TROUBLE RELAXING: NOT AT ALL
GAD7 TOTAL SCORE: 0
3. WORRYING TOO MUCH ABOUT DIFFERENT THINGS: NOT AT ALL
6. BECOMING EASILY ANNOYED OR IRRITABLE: NOT AT ALL

## 2021-05-12 ASSESSMENT — MIFFLIN-ST. JEOR: SCORE: 1116.72

## 2021-05-12 ASSESSMENT — PATIENT HEALTH QUESTIONNAIRE - PHQ9: SUM OF ALL RESPONSES TO PHQ QUESTIONS 1-9: 3

## 2021-05-15 ENCOUNTER — RECORDS - HEALTHEAST (OUTPATIENT)
Dept: FAMILY MEDICINE | Facility: CLINIC | Age: 76
End: 2021-05-15

## 2021-05-17 ENCOUNTER — AMBULATORY - HEALTHEAST (OUTPATIENT)
Dept: LAB | Facility: CLINIC | Age: 76
End: 2021-05-17

## 2021-05-17 DIAGNOSIS — I10 ESSENTIAL HYPERTENSION: ICD-10-CM

## 2021-05-17 DIAGNOSIS — E78.5 DYSLIPIDEMIA: ICD-10-CM

## 2021-05-17 LAB
ALBUMIN SERPL-MCNC: 3.9 G/DL (ref 3.5–5)
ALP SERPL-CCNC: 74 U/L (ref 45–120)
ALT SERPL W P-5'-P-CCNC: 31 U/L (ref 0–45)
ANION GAP SERPL CALCULATED.3IONS-SCNC: 12 MMOL/L (ref 5–18)
AST SERPL W P-5'-P-CCNC: 30 U/L (ref 0–40)
BILIRUB SERPL-MCNC: 0.5 MG/DL (ref 0–1)
BUN SERPL-MCNC: 15 MG/DL (ref 8–28)
CALCIUM SERPL-MCNC: 9 MG/DL (ref 8.5–10.5)
CHLORIDE BLD-SCNC: 105 MMOL/L (ref 98–107)
CHOLEST SERPL-MCNC: 198 MG/DL
CO2 SERPL-SCNC: 24 MMOL/L (ref 22–31)
CREAT SERPL-MCNC: 0.74 MG/DL (ref 0.6–1.1)
FASTING STATUS PATIENT QL REPORTED: YES
GFR SERPL CREATININE-BSD FRML MDRD: >60 ML/MIN/1.73M2
GLUCOSE BLD-MCNC: 88 MG/DL (ref 70–125)
HDLC SERPL-MCNC: 47 MG/DL
LDLC SERPL CALC-MCNC: 77 MG/DL
POTASSIUM BLD-SCNC: 3.9 MMOL/L (ref 3.5–5)
PROT SERPL-MCNC: 7 G/DL (ref 6–8)
SODIUM SERPL-SCNC: 141 MMOL/L (ref 136–145)
TRIGL SERPL-MCNC: 372 MG/DL

## 2021-05-27 VITALS
BODY MASS INDEX: 31.51 KG/M2 | HEART RATE: 76 BPM | SYSTOLIC BLOOD PRESSURE: 114 MMHG | HEIGHT: 59 IN | WEIGHT: 156.3 LBS | DIASTOLIC BLOOD PRESSURE: 62 MMHG

## 2021-05-27 ASSESSMENT — PATIENT HEALTH QUESTIONNAIRE - PHQ9: SUM OF ALL RESPONSES TO PHQ QUESTIONS 1-9: 3

## 2021-05-27 NOTE — TELEPHONE ENCOUNTER
Refill Approved    Rx renewed per Medication Renewal Policy. Medication was last renewed on 9/9/18 &. 2/26/19    Last office visit 3/4/19    Micha Pompa, [facility] Connection Triage/Med Refill 4/4/2019     Requested Prescriptions   Pending Prescriptions Disp Refills     hydroCHLOROthiazide (HYDRODIURIL) 25 MG tablet 30 tablet 0     Sig: Take 1 tablet (25 mg total) by mouth daily.    Diuretics/Combination Diuretics Refill Protocol  Passed - 4/3/2019 12:14 PM       Passed - Visit with PCP or prescribing provider visit in past 12 months    Last office visit with prescriber/PCP: 3/4/2019 Isabella Garsia MD OR same dept: 3/4/2019 Isabella Garsia MD OR same specialty: 3/4/2019 Isabella Garsia MD  Last physical: 8/2/2017 Last MTM visit: Visit date not found   Next visit within 3 mo: Visit date not found  Next physical within 3 mo: Visit date not found  Prescriber OR PCP: Isabella Iverson MD  Last diagnosis associated with med order: 1. Essential hypertension  - hydroCHLOROthiazide (HYDRODIURIL) 25 MG tablet; Take 1 tablet (25 mg total) by mouth daily.  Dispense: 30 tablet; Refill: 0    2. Dyslipidemia  - atorvastatin (LIPITOR) 20 MG tablet; Take 1 tablet (20 mg total) by mouth at bedtime.  Dispense: 90 tablet; Refill: 2    3. Mood disorder (H)  - citalopram (CELEXA) 20 MG tablet; Take 1 tablet (20 mg total) by mouth daily.  Dispense: 90 tablet; Refill: 2    4. Anxiety  - citalopram (CELEXA) 20 MG tablet; Take 1 tablet (20 mg total) by mouth daily.  Dispense: 90 tablet; Refill: 2    If protocol passes may refill for 12 months if within 3 months of last provider visit (or a total of 15 months).            Passed - Serum Potassium in past 12 months     Lab Results   Component Value Date    Potassium 3.5 02/26/2019            Passed - Serum Sodium in past 12 months     Lab Results   Component Value Date    Sodium 140 02/26/2019            Passed - Blood pressure on file  in past 12 months    BP Readings from Last 1 Encounters:   03/04/19 134/56            Passed - Serum Creatinine in past 12 months     Creatinine   Date Value Ref Range Status   02/26/2019 0.87 0.60 - 1.10 mg/dL Final             atorvastatin (LIPITOR) 20 MG tablet 90 tablet 2     Sig: Take 1 tablet (20 mg total) by mouth at bedtime.    Statins Refill Protocol (Hmg CoA Reductase Inhibitors) Passed - 4/3/2019 12:14 PM       Passed - PCP or prescribing provider visit in past 12 months     Last office visit with prescriber/PCP: 3/4/2019 Isabella Garsia MD OR same dept: 3/4/2019 Isabella Garsia MD OR same specialty: 3/4/2019 Isabella Garsia MD  Last physical: 8/2/2017 Last MTM visit: Visit date not found   Next visit within 3 mo: Visit date not found  Next physical within 3 mo: Visit date not found  Prescriber OR PCP: Isabella Iverson MD  Last diagnosis associated with med order: 1. Essential hypertension  - hydroCHLOROthiazide (HYDRODIURIL) 25 MG tablet; Take 1 tablet (25 mg total) by mouth daily.  Dispense: 30 tablet; Refill: 0    2. Dyslipidemia  - atorvastatin (LIPITOR) 20 MG tablet; Take 1 tablet (20 mg total) by mouth at bedtime.  Dispense: 90 tablet; Refill: 2    3. Mood disorder (H)  - citalopram (CELEXA) 20 MG tablet; Take 1 tablet (20 mg total) by mouth daily.  Dispense: 90 tablet; Refill: 2    4. Anxiety  - citalopram (CELEXA) 20 MG tablet; Take 1 tablet (20 mg total) by mouth daily.  Dispense: 90 tablet; Refill: 2    If protocol passes may refill for 12 months if within 3 months of last provider visit (or a total of 15 months).             citalopram (CELEXA) 20 MG tablet 90 tablet 2     Sig: Take 1 tablet (20 mg total) by mouth daily.    SSRI Refill Protocol  Passed - 4/3/2019 12:14 PM       Passed - PCP or prescribing provider visit in last year    Last office visit with prescriber/PCP: 3/4/2019 Isabella Garsia MD OR same dept: 3/4/2019  Isabella Garsia MD OR same specialty: 3/4/2019 Isabella Garsia MD  Last physical: 8/2/2017 Last MTM visit: Visit date not found   Next visit within 3 mo: Visit date not found  Next physical within 3 mo: Visit date not found  Prescriber OR PCP: Isabella Iverson MD  Last diagnosis associated with med order: 1. Essential hypertension  - hydroCHLOROthiazide (HYDRODIURIL) 25 MG tablet; Take 1 tablet (25 mg total) by mouth daily.  Dispense: 30 tablet; Refill: 0    2. Dyslipidemia  - atorvastatin (LIPITOR) 20 MG tablet; Take 1 tablet (20 mg total) by mouth at bedtime.  Dispense: 90 tablet; Refill: 2    3. Mood disorder (H)  - citalopram (CELEXA) 20 MG tablet; Take 1 tablet (20 mg total) by mouth daily.  Dispense: 90 tablet; Refill: 2    4. Anxiety  - citalopram (CELEXA) 20 MG tablet; Take 1 tablet (20 mg total) by mouth daily.  Dispense: 90 tablet; Refill: 2    If protocol passes may refill for 12 months if within 3 months of last provider visit (or a total of 15 months).

## 2021-05-27 NOTE — TELEPHONE ENCOUNTER
Patient Returning Call  Reason for call: Patient is returning a call.  Information relayed to patient:  None, no message found.  Patient has additional questions:  Yes  If YES, what are your questions/concerns: Patient stated she was to give an update of her condition. Patient's relayed her blood pressure has come down and she is feeling better. Patient's blood pressure readings have been 140-145/ 85-90 from the 160-170/ .   Okay to leave a detailed message?:  No call back needed

## 2021-05-28 ENCOUNTER — RECORDS - HEALTHEAST (OUTPATIENT)
Dept: ADMINISTRATIVE | Facility: CLINIC | Age: 76
End: 2021-05-28

## 2021-05-28 ASSESSMENT — ANXIETY QUESTIONNAIRES: GAD7 TOTAL SCORE: 0

## 2021-05-29 ENCOUNTER — RECORDS - HEALTHEAST (OUTPATIENT)
Dept: ADMINISTRATIVE | Facility: CLINIC | Age: 76
End: 2021-05-29

## 2021-05-30 ENCOUNTER — RECORDS - HEALTHEAST (OUTPATIENT)
Dept: ADMINISTRATIVE | Facility: CLINIC | Age: 76
End: 2021-05-30

## 2021-05-30 NOTE — TELEPHONE ENCOUNTER
RN cannot approve Refill Request    RN can NOT refill this medication ordered on 4/4/2019 with an end date of 7/3/2019. . Last office visit: 3/4/2019 Isabella Garsia MD Last Physical: 8/2/2017 Last MTM visit: Visit date not found Last visit same specialty: 3/4/2019 Isabella Garsia MD.  Next visit within 3 mo: Visit date not found  Next physical within 3 mo: Visit date not found      Zaira Khan, [facility] Connection Triage/Med Refill 6/30/2019    Requested Prescriptions   Pending Prescriptions Disp Refills     hydroCHLOROthiazide (HYDRODIURIL) 25 MG tablet [Pharmacy Med Name: hydroCHLOROthiazide Oral Tablet 25 MG] 90 tablet 2     Sig: Take 1 tablet (25 mg total) by mouth daily.       Diuretics/Combination Diuretics Refill Protocol  Passed - 6/30/2019  7:00 AM        Passed - Visit with PCP or prescribing provider visit in past 12 months     Last office visit with prescriber/PCP: 3/4/2019 Isabella Garsia MD OR same dept: 3/4/2019 Isabella Garsia MD OR same specialty: 3/4/2019 Isabella Garsia MD  Last physical: 8/2/2017 Last MTM visit: Visit date not found   Next visit within 3 mo: Visit date not found  Next physical within 3 mo: Visit date not found  Prescriber OR PCP: Isabella Iverson MD  Last diagnosis associated with med order: 1. Essential hypertension  - hydroCHLOROthiazide (HYDRODIURIL) 25 MG tablet [Pharmacy Med Name: hydroCHLOROthiazide Oral Tablet 25 MG]; Take 1 tablet (25 mg total) by mouth daily.  Dispense: 90 tablet; Refill: 0    If protocol passes may refill for 12 months if within 3 months of last provider visit (or a total of 15 months).             Passed - Serum Potassium in past 12 months      Lab Results   Component Value Date    Potassium 3.5 02/26/2019             Passed - Serum Sodium in past 12 months      Lab Results   Component Value Date    Sodium 140 02/26/2019             Passed - Blood pressure on file in  past 12 months     BP Readings from Last 1 Encounters:   03/04/19 134/56             Passed - Serum Creatinine in past 12 months      Creatinine   Date Value Ref Range Status   02/26/2019 0.87 0.60 - 1.10 mg/dL Final

## 2021-05-30 NOTE — TELEPHONE ENCOUNTER
Refill Approved    Rx renewed per Medication Renewal Policy. Medication was last renewed on 4/4/19.    Niurka Smiley, [facility] Triage/Med Refill 7/1/2019     Requested Prescriptions   Pending Prescriptions Disp Refills     atorvastatin (LIPITOR) 20 MG tablet [Pharmacy Med Name: Atorvastatin Calcium Oral Tablet 20 MG] 90 tablet 1     Sig: TAKE ONE TABLET BY MOUTH AT BEDTIME       Statins Refill Protocol (Hmg CoA Reductase Inhibitors) Passed - 7/1/2019  7:00 AM        Passed - PCP or prescribing provider visit in past 12 months      Last office visit with prescriber/PCP: 3/4/2019 Isabella Garsia MD OR same dept: 3/4/2019 Isabella Garsia MD OR same specialty: 3/4/2019 Isabella Garsia MD  Last physical: 8/2/2017 Last MTM visit: Visit date not found   Next visit within 3 mo: Visit date not found  Next physical within 3 mo: Visit date not found  Prescriber OR PCP: Isabella Iverson MD  Last diagnosis associated with med order: 1. Dyslipidemia  - atorvastatin (LIPITOR) 20 MG tablet [Pharmacy Med Name: Atorvastatin Calcium Oral Tablet 20 MG]; TAKE ONE TABLET BY MOUTH AT BEDTIME   Dispense: 90 tablet; Refill: 1    2. Mood disorder (H)  - citalopram (CELEXA) 20 MG tablet [Pharmacy Med Name: Citalopram Hydrobromide Oral Tablet 20 MG]; TAKE ONE TABLET BY MOUTH ONE TIME DAILY   Dispense: 90 tablet; Refill: 1    3. Anxiety  - citalopram (CELEXA) 20 MG tablet [Pharmacy Med Name: Citalopram Hydrobromide Oral Tablet 20 MG]; TAKE ONE TABLET BY MOUTH ONE TIME DAILY   Dispense: 90 tablet; Refill: 1    If protocol passes may refill for 12 months if within 3 months of last provider visit (or a total of 15 months).             citalopram (CELEXA) 20 MG tablet [Pharmacy Med Name: Citalopram Hydrobromide Oral Tablet 20 MG] 90 tablet 1     Sig: TAKE ONE TABLET BY MOUTH ONE TIME DAILY       SSRI Refill Protocol  Passed - 7/1/2019  7:00 AM        Passed - PCP or prescribing provider  visit in last year     Last office visit with prescriber/PCP: 3/4/2019 Isabella Garsia MD OR same dept: 3/4/2019 Isabella Garsia MD OR same specialty: 3/4/2019 Isabella Garsia MD  Last physical: 8/2/2017 Last MTM visit: Visit date not found   Next visit within 3 mo: Visit date not found  Next physical within 3 mo: Visit date not found  Prescriber OR PCP: Isabella Iverson MD  Last diagnosis associated with med order: 1. Dyslipidemia  - atorvastatin (LIPITOR) 20 MG tablet [Pharmacy Med Name: Atorvastatin Calcium Oral Tablet 20 MG]; TAKE ONE TABLET BY MOUTH AT BEDTIME   Dispense: 90 tablet; Refill: 1    2. Mood disorder (H)  - citalopram (CELEXA) 20 MG tablet [Pharmacy Med Name: Citalopram Hydrobromide Oral Tablet 20 MG]; TAKE ONE TABLET BY MOUTH ONE TIME DAILY   Dispense: 90 tablet; Refill: 1    3. Anxiety  - citalopram (CELEXA) 20 MG tablet [Pharmacy Med Name: Citalopram Hydrobromide Oral Tablet 20 MG]; TAKE ONE TABLET BY MOUTH ONE TIME DAILY   Dispense: 90 tablet; Refill: 1    If protocol passes may refill for 12 months if within 3 months of last provider visit (or a total of 15 months).

## 2021-05-31 ENCOUNTER — RECORDS - HEALTHEAST (OUTPATIENT)
Dept: ADMINISTRATIVE | Facility: CLINIC | Age: 76
End: 2021-05-31

## 2021-05-31 VITALS — BODY MASS INDEX: 30.28 KG/M2 | HEIGHT: 60 IN | WEIGHT: 154.25 LBS

## 2021-06-01 VITALS — BODY MASS INDEX: 30.56 KG/M2 | WEIGHT: 156.5 LBS

## 2021-06-01 VITALS — WEIGHT: 153.2 LBS | BODY MASS INDEX: 29.92 KG/M2

## 2021-06-02 VITALS — BODY MASS INDEX: 30.08 KG/M2 | WEIGHT: 154 LBS

## 2021-06-02 VITALS — WEIGHT: 154.6 LBS | BODY MASS INDEX: 30.19 KG/M2

## 2021-06-02 VITALS — WEIGHT: 153.56 LBS | BODY MASS INDEX: 29.99 KG/M2

## 2021-06-02 VITALS — BODY MASS INDEX: 30.33 KG/M2 | WEIGHT: 155.3 LBS

## 2021-06-03 NOTE — TELEPHONE ENCOUNTER
----- Message from Isabella Iverson MD sent at 11/18/2019  4:19 PM CST -----  Please inform patient of the small heel spur on her xray.  She has a referral to podiatry

## 2021-06-03 NOTE — PROGRESS NOTES
FOOT AND ANKLE SURGERY/PODIATRY Progress Note        ASSESSMENT:   Plantar Fasciitis  Gastrosoleus Equinus       TREATMENT:  -Patient has pain along the plantar heel at insertion of plantar fascia consistent with plantar fasciitis. We discussed treatment options to include stretching exercises, anti-inflammatory medication, orthotics, steroid injections, physical therapy and a night splint. I reviewed her x-rays which indicate a small plantar calcaneal spur.     -All questions invited and answered. I will start her on Naproxen. Will consider [facility] for custom orthotics and steroid injection.      -I have asked her to follow-up after using the over the counter orthotics x3-4 weeks if symptoms continue.     Lawrence Butterfield DPM  [facility] Foot & Ankle Surgery/Podiatry       HPI: I was asked to see Ariana Hodge today by Dr. Kim for right heel pain. The patient states she had pain for several weeks and has tried Halflinger shoes with some relief. She believes her pain started after standing on a cement floor for a long period of time. She admits to experiencing plantar fasciitis in the past.     Past Medical History:   Diagnosis Date     Skin cancer        Past Surgical History:   Procedure Laterality Date     [place] REDUCTION OF LARGE BREAST      Description: Breast Surgery Reduction Procedure;  Proc Date: 09/01/2006;     REDUCTION MAMMAPLASTY Bilateral In the 2000's       Allergies   Allergen Reactions     Amiloride      Cortisone      Cortisone Acetate      depressed on           Current Outpatient Medications:      atorvastatin (LIPITOR) 20 MG tablet, Take 1 tablet (20 mg total) by mouth at bedtime., Disp: 90 tablet, Rfl: 0     atorvastatin (LIPITOR) 20 MG tablet, TAKE ONE TABLET BY MOUTH AT BEDTIME, Disp: 90 tablet, Rfl: 2     citalopram (CELEXA) 20 MG tablet, Take 1 tablet (20 mg total) by mouth daily., Disp: 90 tablet, Rfl: 0     citalopram (CELEXA) 20 MG tablet, TAKE ONE TABLET BY MOUTH ONE TIME  DAILY, Disp: 90 tablet, Rfl: 2     ketoconazole (NIZORAL) 2 % shampoo, Use every 3 to 4 days for up to 8 weeks; then apply only as needed to control dandruff/itchiness/rash., Disp: 120 mL, Rfl: 3     losartan (COZAAR) 25 MG tablet, Take 1 tablet (25 mg total) by mouth daily., Disp: 90 tablet, Rfl: 0     triamcinolone (KENALOG) 0.1 % cream, , Disp: , Rfl: 6     aspirin 81 MG EC tablet, Take 81 mg by mouth daily., Disp: , Rfl:      codeine-guaiFENesin (GUAIFENESIN AC)  mg/5 mL liquid, Take 10 mL by mouth 2 (two) times a day as needed for cough., Disp: 240 mL, Rfl: 0     fluocinonide (LIDEX) 0.05 % external solution, , Disp: , Rfl:      hydroCHLOROthiazide (HYDRODIURIL) 25 MG tablet, Take 1 tablet (25 mg total) by mouth daily., Disp: 90 tablet, Rfl: 2    No family history on file.    Social History     Socioeconomic History     Marital status:      Spouse name: Not on file     Number of children: Not on file     Years of education: Not on file     Highest education level: Not on file   Occupational History     Not on file   Social Needs     Financial resource strain: Not on file     Food insecurity:     Worry: Not on file     Inability: Not on file     Transportation needs:     Medical: Not on file     Non-medical: Not on file   Tobacco Use     Smoking status: Never Smoker     Smokeless tobacco: Never Used   Substance and Sexual Activity     Alcohol use: Not on file     Drug use: Not on file     Sexual activity: Not on file   Lifestyle     Physical activity:     Days per week: Not on file     Minutes per session: Not on file     Stress: Not on file   Relationships     Social connections:     Talks on phone: Not on file     Gets together: Not on file     Attends Gnosticism service: Not on file     Active member of club or organization: Not on file     Attends meetings of clubs or organizations: Not on file     Relationship status: Not on file     Intimate partner violence:     Fear of current or ex partner:  Not on file     Emotionally abused: Not on file     Physically abused: Not on file     Forced sexual activity: Not on file   Other Topics Concern     Not on file   Social History Narrative     Not on file       Review of Systems - 10 point Review of Systems is negative     OBJECTIVE:  Appearance: alert, well appearing, and in no distress.    General appearance: Patient is alert and fully cooperative with history & exam.  No sign of distress is noted during the visit.     Psychiatric: Affect is pleasant & appropriate.  Patient appears motivated to improve health.     Respiratory: Breathing is regular & unlabored while sitting.     HEENT: Hearing is intact to spoken word.  Speech is clear.  No gross evidence of visual impairment that would impact ambulation.    Vascular: Dorsalis pedis and posterior tibial pulses are palpable. There is pedal hair growth right.  CFT < 3 sec from anterior tibial surface to distal digits right. There is no appreciable edema noted.  Dermatologic: Turgor and texture are within normal limits. No coloration or temperature changes. No primary or secondary lesions noted.  Neurologic: All epicritic and proprioceptive sensations are grossly intact right.  Musculoskeletal: Mild pain along the plantar right heel at the insertion of the plantar fascia. Limited ankle dorsiflexion with knee extended and flexed.     Imaging:     Xr Calcaneus Right 2 Or More Views    Result Date: 11/18/2019  EXAM: XR CALCANEUS RIGHT 2 OR MORE VIEWS LOCATION: [facility] DATE/TIME: 11/18/2019 3:46 PM INDICATION: Pain in right foot COMPARISON: None.     Tiny right plantar calcaneal spur. Right calcaneus otherwise negative. No fracture.

## 2021-06-03 NOTE — PROGRESS NOTES
ASSESSMENT/PLAN:  Pain of right heel  Xray showed superior calcaneal bone spur; will refer to podiatry  -     XR Calcaneus Right 2 or More Views; Future  -     Ambulatory referral to Podiatry    Visit for screening mammogram  -     Mammo Screening Bilateral; Future    Acute pain of left shoulder  Tenderness along glenohumeral joint but with full range of motion  Will trial PT  -     Ambulatory referral to Physical Therapy    Essential hypertension  Add losartan (monitor for cough, the reason lisinopril was d/c).  Continue with HCTZ 25mg  F/u in 1 month for recheck  -     losartan (COZAAR) 25 MG tablet; Take 1 tablet (25 mg total) by mouth daily.    Mood disorder (H)  Stable on celexa      Orders Placed This Encounter   Procedures     Mammo Screening Bilateral     Standing Status:   Future     Standing Expiration Date:   2/18/2021     Order Specific Question:   Patient's previous breast density:     Answer:   Scattered fibroglandular density [2]     Order Specific Question:   Can the procedure be changed per Radiologist protocol?     Answer:   Yes     XR Calcaneus Right 2 or More Views     Standing Status:   Future     Number of Occurrences:   1     Standing Expiration Date:   11/18/2020     Order Specific Question:   Can the procedure be changed per Radiologist protocol?     Answer:   Yes     Ambulatory referral to Podiatry     Referral Priority:   Routine     Referral Type:   Consultation     Referral Reason:   Evaluation and Treatment     Requested Specialty:   Podiatry     Number of Visits Requested:   1     Ambulatory referral to Physical Therapy     Referral Priority:   Routine     Referral Type:   Physical Therapy     Referral Reason:   Evaluation and Treatment     Requested Specialty:   Physical Therapy     Number of Visits Requested:   1     CHIEF COMPLAINT:  Chief Complaint   Patient presents with     foot pain     right foot thinks it's bone spur x 4 weeks     Shoulder Pain     left shoulder  x 1 week        HISTORY OF PRESENT ILLNESS:  Ariana is a 74 y.o. female presenting to the clinic today for right foot pain and left shoulder pain.     Right Foot Pain: She has been experiencing right foot pain for about a month. 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 The pain is localized to the right heel. She saw a chiropractor who thought she had bone spurs. He gave her some stretching exercises for the foot pain. She has tried soaking the foot and tried ice on it. The pain has improved enough to allow her to walk, however, the pain is still present. She has never had an x-ray to confirm if it is a bone spur.     Left Shoulder Pain: She has had some left tricep pain for a few weeks. She was not concerned about the pain. However, 3-4 days ago, she woke up and her shoulder felt like it was on fire. The burning sensation was localized to the front and back of left her shoulder. The burning sensation lasted for about 5 minutes and then went away. The shoulder has been sore since the burning sensation. She does not recall any trauma to the shoulder.     Hypertension: She has been taking HCTZ 25 mg. Her blood pressure today in clinic is 156/86 mmHg. Her systolic pressures at home have been in the 140's. She has used lisinopril in the past and experienced a cough.     Health Maintenance: She is due for a mammogram. Her last mammogram was in 2017 which was negative.     REVIEW OF SYSTEMS:   Constitutional: Negative.   HENT: Negative.   Eyes: Negative.   Respiratory: Negative.   Cardiovascular: Negative.   Gastrointestinal: Negative.   Endocrine: Negative.   Genitourinary: Negative.   Musculoskeletal: Positive for right foot pain and left shoulder pain.   Skin: Negative.   Allergic/Immunologic: Negative.   Neurological: Negative.   Hematological: Negative.   Psychiatric/Behavioral: Negative.   All other systems are negative.    TOBACCO USE:  Social History     Tobacco Use    Smoking Status Never Smoker   Smokeless Tobacco Never Used       VITALS:  Vitals:    11/18/19 1506 11/18/19 1532   BP: 156/86 158/70   Patient Site: Left Arm Left Arm   Patient Position: Sitting Sitting   Cuff Size: Adult Regular Adult Regular   Pulse: 68    Weight: 156 lb 2 oz (70.8 kg)    Height: 5' (1.524 m)      Wt Readings from Last 3 Encounters:   11/18/19 156 lb 2 oz (70.8 kg)   03/04/19 155 lb 4.8 oz (70.4 kg)   02/26/19 154 lb (69.9 kg)       PHYSICAL EXAM:  Constitutional: Patient is oriented to person, place, and time. Patient appears well-developed and well-nourished. No distress.   Head: Normocephalic and atraumatic.   Right Ear: External ear normal.   Left Ear: External ear normal.   Right Foot: Tenderness to palpation of the medial aspect of the left heel.   Left Shoulder: Full range of motion of the left shoulder. Tenderness to pal of the left glenohumeral joint.   Neurological: Patient is alert and oriented to person, place, and time. Patient has normal reflexes. No cranial nerve deficit. Coordination normal.   Skin: Skin is warm and dry. No rash noted. Patient is not diaphoretic. No erythema. No pallor.    ADDITIONAL HISTORY SUMMARIZED (2): None.  DECISION TO OBTAIN EXTRA INFORMATION (1): None.   RADIOLOGY TESTS (1): Ordered XR right foot today.   LABS (1): BMP 2/26/19.  MEDICINE TESTS (1): None.  INDEPENDENT REVIEW (2 each): interpreted xray.     ISonia,  am scribing for and in the presence of, Dr. Kim.    Dr. Judy HARPER, personally performed the services described in this documentation, as scribed by Sonia James in my presence, and it is both accurate and complete.    MEDICATIONS:  Current Outpatient Medications   Medication Sig Dispense Refill     aspirin 81 MG EC tablet Take 81 mg by mouth daily.       atorvastatin (LIPITOR) 20 MG tablet Take 1 tablet (20 mg total) by mouth at bedtime. 90 tablet 0     atorvastatin (LIPITOR) 20 MG tablet TAKE ONE TABLET BY MOUTH AT BEDTIME 90  tablet 2     citalopram (CELEXA) 20 MG tablet Take 1 tablet (20 mg total) by mouth daily. 90 tablet 0     citalopram (CELEXA) 20 MG tablet TAKE ONE TABLET BY MOUTH ONE TIME DAILY 90 tablet 2     codeine-guaiFENesin (GUAIFENESIN AC)  mg/5 mL liquid Take 10 mL by mouth 2 (two) times a day as needed for cough. 240 mL 0     fluocinonide (LIDEX) 0.05 % external solution        hydroCHLOROthiazide (HYDRODIURIL) 25 MG tablet Take 1 tablet (25 mg total) by mouth daily. 90 tablet 2     ketoconazole (NIZORAL) 2 % shampoo Use every 3 to 4 days for up to 8 weeks; then apply only as needed to control dandruff/itchiness/rash. 120 mL 3     losartan (COZAAR) 25 MG tablet Take 1 tablet (25 mg total) by mouth daily. 90 tablet 0     triamcinolone (KENALOG) 0.1 % cream   6     No current facility-administered medications for this visit.        Total data points:4

## 2021-06-03 NOTE — PROGRESS NOTES
[facility] Certification Request    December 3, 2019      Patient: Ariana Hodge  MR Number: [id number]  YOB: 1945  Date of Visit: 12/3/2019      Dear Isabella Yo:    Thank you for this referral.   We are seeing Ariana Hodge for Physical Therapy of acute left shoulder pain.    Medicare and/or Medicaid requires physician review and approval of the treatment plan. Please review the plan of care and verify that you agree with the therapy plan of care by co-signing this note.      Plan of Care  Authorization / Certification Start Date: 12/03/19  Authorization / Certification End Date: 03/02/20  Authorization / Certification Number of Visits: up to 20  Communication with: Referral Source  Patient Related Instruction: Nature of Condition;Treatment plan and rationale;Self Care instruction;Basis of treatment;Body mechanics;Posture;Precautions;Next steps;Expected outcome  Times per Week: 2  Number of Weeks: 4  Number of Visits: 6-8  Therapeutic Exercise: ROM;Stretching;Strengthening  Neuromuscular Reeducation: kinesio tape;posture  Manual Therapy: soft tissue mobilization;joint mobilization  Modalities: cold pack;ultrasound      Goals:  Pt. will demonstrate/verbalize independence in self-management of condition in : 6 weeks  Pt. will be independent with home exercise program in : 6 weeks  Pt. will report decreased intensity, frequency of : Pain;in 6 weeks;Comment  Comment:: left shoulder to less than 2/10  Pt. 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    Sincerely,      Bela Mullins, PT        Physician recommendation:      ___ Follow therapist's recommendation        ___ Modify therapy      *Physician co-signature indicates they certify the need for these services furnished within this plan and while under their care.      [facility]   Shoulder Initial Evaluation    Patient Name: Ariana Hodge  Date of evaluation: 12/3/2019  Referral Diagnosis: Acute pain of left shoulder  Referring provider: Isabella Garsia*  Visit Diagnosis:     ICD-10-CM    1. Acute pain of left shoulder M25.512    2. Impingement syndrome of left shoulder M75.42    3. Decreased ROM of left shoulder M25.612    4. Decreased ROM of intervertebral discs of cervical spine M53.82        Assessment:      Skilled PT is required to determine course of treatment  Pt. is appropriate for skilled PT intervention as outlined in the Plan of Care (POC).  Pt. is a good candidate for skilled PT services to improve pain levels and function.    Goals:  Pt. will demonstrate/verbalize independence in self-management of condition in : 6 weeks  Pt. will be independent with home exercise program in : 6 weeks  Pt. will report decreased intensity, frequency of : Pain;in 6 weeks;Comment  Comment:: left shoulder to less than 2/10  Pt. 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    Barriers to Learning or Achieving Goals:  Co-morbidities or other medical factors.  neck arthritis       Plan / Patient Instructions:        Plan of Care:   Authorization / Certification Start Date: 12/03/19  Authorization / Certification End Date: 03/02/20  Authorization / Certification Number of Visits: up to 20  Communication with: Referral Source  Patient Related  Instruction: Nature of Condition;Treatment plan and rationale;Self Care instruction;Basis of treatment;Body mechanics;Posture;Precautions;Next steps;Expected outcome  Times per Week: 2  Number of Weeks: 4  Number of Visits: 6-8  Therapeutic Exercise: ROM;Stretching;Strengthening  Neuromuscular Reeducation: kinesio tape;posture  Manual Therapy: soft tissue mobilization;joint mobilization  Modalities: cold pack;ultrasound      POC and pathology of condition were reviewed with patient.  Pt. is in agreement with the Plan of Care  A Home Exercise Program (HEP) was initiated today.  Pt. was instructed in exercises by PT and patient was given a handout with detailed instructions.  Plan for next visit: US, K-tape, CFM, cold pack and progression of rom/ PROM if indicated.  Add theraband for rowing and pulldowns/shoulder    .   Subjective:       Social information:   Living Situation:unknown   Occupation:unknown   Work Status:NA   Equipment Available: None    History of Present Illness:    Ariana is a 74 y.o. female who presents to therapy today with complaints of left shoulder pain. Date of onset/duration of symptoms is 2 weeks ago. Onset was sudden and present when she woke one morning. Symptoms are constant and getting better. She denies history of similar symptoms, however does recall having some left mid-lateral arm pain prior to this onset of acute episode. 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  Does not do any exercise program at this time.  Reports she has a high pain tolerance.   Also reports that she has had some left hand parasthesias prior to this onset of left shoulder pain.  She is left and right handed.    Pain Ratin  Pain rating at best: 2  Pain rating at worst: 10  Pain description: soreness    Functional limitations are described as occurring with:   holding and doing pottery  lifting  reaching at shoulder  height and overhead  sitting anytime  sleeping  standing anytime    Patient reports benefit from:  rest         Objective:      Note: Items left blank indicates the item was not performed or not indicated at the time of the evaluation.    Patient Outcome Measures :    Shoulder Pain and Disability Index (SPADI) in %: 12     Scores range from 0-100%, where a score of 0% represents minimal pain and maximal function. The minimal clincically important difference is a score reduction of 10%.    Shoulder Examination  1. Acute pain of left shoulder     2. Impingement syndrome of left shoulder     3. Decreased ROM of left shoulder     4. Decreased ROM of intervertebral discs of cervical spine       Involved side: Left  Posture Observation:      General sitting posture is  poor with rounded/forward shoulders.  Cervical Clearing: Abnormal major loss of lateral flexion bilaterally and mod loss of katina rotation    Shoulder/Elbow ROM    Date: 12/3/19     Shoulder and Elbow ROM ( )   AROM in degrees AROM in degrees AROM in degrees    Right Left Right Left Right Left   Shoulder Flexion (0-180 ) 152 70/120       Shoulder Abduction (0-180 ) 168 50/135       Shoulder Extension (0-60 ) 65 54       Shoulder ER (0-90 ) 90 70       Shoulder IR (0-70 ) 90 90       Shoulder IR/Ext         Elbow Flexion (150 ) 150 150       Elbow Extension (0 ) 0 0        PROM in degrees PROM in degrees PROM in degrees    Right Left Right Left Right Left   Shoulder Flexion (0-180 )         Shoulder Abduction (0-180 )         Shoulder Extension (0-60 )         Shoulder ER (0-90 )         Shoulder IR (0-70 )         Elbow Flexion (150 )         Elbow Extension (0 )           Shoulder/Elbow Strength   Date: 12/3/19     Shoulder/Elbow Strength (/5)  Manual Muscle Test (MMT) MMT MMT MMT    Right Left Right Left Right Left   Shoulder Flexion 5/5 all 4       Supraspinatus  4       Shoulder Abduction  3       Shoulder Extension  4       Shoulder External Rotation  4        Shoulder Internal Rotation  4       Elbow Flexion  4       Elbow Extension  4       Other:         Other:             Palpation: tender to palpation at anterior left shoulder near coracoid process/ biceps long head and tender posterior left shoulder and lateral left shoulder mid-shaft    Shoulder Special Tests     Impingement Cluster Right (+/-) Left (+/-) Rotator Cuff Tests Right (+/-) Left (+/-)   Aranda-Anson  + Drop Arm Sign     Painful Arc   Hornblowers  +   Infraspinatus Test   ERLS     AC Tests Right (+/-) Left (+/-) IRLS     Active Compression   Labral Tests Right (+/-) Left (+/-)   Crossbody Adduction   Biceps Load Test II     AC Resisted Extension   Jerk Test     GH Instability Tests Right (+/-) Left (+/-) Silvia Test     Sulcus Sign   Biceps Tests Right (+/-) Left (+/-)   Apprehension   Speed  +   Relocation   Yaritza mohan     Other:   Other:     Other:   Other:       Exercises:  Exercise #1: shoulder pain management handout with exercises;  3x/day  Comment #1: pendulum  Exercise #2: supine shoulder flexion with hands clasped or with cane  Comment #2: shoulder ER with cane standing  Exercise #3: shoulder IR with cane behind back standing  Comment #3: add doorway stretch for pectoral muscle and wall slide for left arm flexion stretch      Treatment Today    TREATMENT MINUTES COMMENTS   Evaluation 30 Left shoulder   Self-care/ Home management     Manual therapy     Neuromuscular Re-education 5 k-tape for left shoulder pain; 3 strips   Therapeutic Activity     Therapeutic Exercises 15 See flow sheet or above   Gait training     Modality__________________ 11 Ultrasound seated to left anterior and posterior shoulder 1 MHz 100% 1.0 w/cm2 x 8' with 3' set up              Total 61    Blank areas are intentional and mean the treatment did not include these items.     PT Evaluation Code: (Please list factors)  Patient History/Comorbidities: arthritis  Examination: decreased rom left shoulder,  impingement  Clinical Presentation: stable  Clinical Decision Making: low    Patient History/  Comorbidities Examination  (body structures and functions, activity limitations, and/or participation restrictions) Clinical Presentation Clinical Decision Making (Complexity)   No documented Comorbidities or personal factors 1-2 Elements Stable and/or uncomplicated Low   1-2 documented comorbidities or personal factor 3 Elements Evolving clinical presentation with changing characteristics Moderate   3-4 documented comorbidities or personal factors 4 or more Unstable and unpredictable High                Bela Mullins, PT  12/3/2019  11:42 AM

## 2021-06-04 VITALS
SYSTOLIC BLOOD PRESSURE: 158 MMHG | HEIGHT: 60 IN | DIASTOLIC BLOOD PRESSURE: 70 MMHG | WEIGHT: 156.13 LBS | BODY MASS INDEX: 30.65 KG/M2 | HEART RATE: 68 BPM

## 2021-06-04 VITALS
TEMPERATURE: 97.9 F | WEIGHT: 154.44 LBS | OXYGEN SATURATION: 96 % | HEART RATE: 63 BPM | DIASTOLIC BLOOD PRESSURE: 86 MMHG | SYSTOLIC BLOOD PRESSURE: 138 MMHG | BODY MASS INDEX: 30.16 KG/M2

## 2021-06-04 VITALS
RESPIRATION RATE: 12 BRPM | HEIGHT: 60 IN | BODY MASS INDEX: 30.49 KG/M2 | SYSTOLIC BLOOD PRESSURE: 126 MMHG | TEMPERATURE: 98.3 F | DIASTOLIC BLOOD PRESSURE: 62 MMHG | HEART RATE: 77 BPM

## 2021-06-04 NOTE — PROGRESS NOTES
"[facility] Daily Progress     Patient Name: Ariana Hodge  Date: 2020  Visit #: 6  PTA visit #:  4  Referral Diagnosis: Acute pain of Left shoulder  Referring provider: Isabella Garsia*  Visit Diagnosis:     ICD-10-CM    1. Acute pain of left shoulder M25.512    2. Impingement syndrome of left shoulder M75.42    3. Decreased ROM of left shoulder M25.612    4. Decreased ROM of intervertebral discs of cervical spine M53.82          Assessment:   Symptoms consistent with impingement left shoulder and poor posture, but improving.  Pt with with persistent mild L shoulder pain. Pt having some intermittent pain which radiates into her hand.  Patient is benefitting from skilled physical therapy and is making steady progress toward functional goals.  Patient is appropriate to continue with skilled physical therapy intervention, as indicated by initial plan of care.    Goal Status:  Pt. will demonstrate/verbalize independence in self-management of condition in : 6 weeks;Progressing toward  Pt. will be independent with home exercise program in : 6 weeks  Pt. will report decreased intensity, frequency of : Pain;in 6 weeks;Comment;Met  Comment:: left shoulder to less than 2/10  Pt. 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  Pt reports her shoulder is more sore in the morning and the evening.     Objective:     Date: 12/3/19 12/26/19  2020    Shoulder and " "Elbow ROM ( )    AROM in degrees AROM in degrees AROM in degrees    Right Left Right Left Right Left   Shoulder Flexion (0-180 ) 152 70/120   140 degrees    140 degrees    Shoulder Abduction (0-180 ) 168 50/135   150 degrees    150 degrees    Shoulder Extension (0-60 ) 65 54   55 degree        Shoulder ER (0-90 ) 90 70   65 degrees    65 degrees    Shoulder IR (0-70 ) 90 90           Shoulder IR/Ext               Elbow Flexion (150 ) 150 150                 Exercises:  Exercise #1: shoulder pain management handout with exercises;  3x/day  Comment #1: pendulum  Exercise #2: supine shoulder flexion with hands clasped or with cane  Comment #2: shoulder ER with cane standing  Exercise #3: shoulder IR with cane behind back standing  Comment #3: doorway pec stretch-hands at elbow height 10-30 seconds 2x/day  Exercise #4: rows L2 band   Comment #4: x10  Exercise #5: shoulder extension L2 band (gave yellow band also)  Comment #5: x10  Exercise #6: corner pec stretch  Comment #6: 30\"  Exercise #7: scap retraction x10  Comment #7: shoulder ER left UE with yellow band x10        Treatment Today   TREATMENT MINUTES COMMENTS   Evaluation     Self-care/ Home management     Manual therapy     Neuromuscular Re-education Not today  Will re assess next visit. Kinesiotape to L shoulder    Therapeutic Activity     Therapeutic Exercises 15 flow sheet or above     Reviewed HEP   Gait training     Modality________ultrasound__________ 11 (8' + 3' set up) US to left ant/posterior shoulder @ 1 MHZ, 100% 1.0 w/cm2     Seated with left arm propped on 2 pillows              Total 26    Blank areas are intentional and mean the treatment did not include these items.       Erica Hackett, PTA,CLT  1/2/2020  "

## 2021-06-04 NOTE — PROGRESS NOTES
[facility] Daily Progress     Patient Name: Ariana Hodge  Date: 2019  Visit #: 4  PTA visit #:  2  Referral Diagnosis: Acute pain of Left shoulder  Referring provider: Isabella Garsia*  Visit Diagnosis:     ICD-10-CM    1. Acute pain of left shoulder M25.512    2. Impingement syndrome of left shoulder M75.42    3. Decreased ROM of left shoulder M25.612    4. Decreased ROM of intervertebral discs of cervical spine M53.82          Assessment:   Symptoms consistent with impingement left shoulder and poor posture, but improving.  Patient is benefitting from skilled physical therapy and is making steady progress toward functional goals.  Patient is appropriate to continue with skilled physical therapy intervention, as indicated by initial plan of care.    Goal Status:On going  Pt. will demonstrate/verbalize independence in self-management of condition in : 6 weeks;Progressing toward  Pt. will be independent with home exercise program in : 6 weeks  Pt. will report decreased intensity, frequency of : Pain;in 6 weeks;Comment;Met  Comment:: left shoulder to less than 2/10  Pt. will have improved quality of sleep: with less pain;waking less times/night;in 6 weeks;Progressing toward  Patient will reach / maintain arm movement: Progressing toward;in 6 weeks;with less pain  for other activity: doing pottery and lifting lay    Patient will decrease : SPADI score;by _ points;in 6 weeks  by ___ points: 5      Plan / Patient Education:     REview scap retraction, yellow Tband for ER, and doorway stretch given today.  Continue with US to left anterior shoulder  Anticipate 2-3 more visits    Subjective:   Reports that overall having less left shoulder pain.  Reports that she is still taking Naproxyn for her plantar fascitis and thinks this is helping left shoulder pain.    Pain Ratin  Much improved; more pain at night 2-3/10  A little more soreness after last visit; prefers the pull downs and less of  "the rowing.  Did not like the UBE    Objective:     Date: 12/3/19       Shoulder and Elbow ROM ( )    AROM in degrees AROM in degrees AROM in degrees    Right Left Right Left Right Left   Shoulder Flexion (0-180 ) 152 70/120           Shoulder Abduction (0-180 ) 168 50/135           Shoulder Extension (0-60 ) 65 54           Shoulder ER (0-90 ) 90 70           Shoulder IR (0-70 ) 90 90           Shoulder IR/Ext               Elbow Flexion (150 ) 150 150                 Exercises:  Exercise #1: shoulder pain management handout with exercises;  3x/day  Comment #1: pendulum  Exercise #2: supine shoulder flexion with hands clasped or with cane  Comment #2: shoulder ER with cane standing  Exercise #3: shoulder IR with cane behind back standing  Comment #3: doorway pec stretch-hands at elbow height 10-30 seconds 2x/day  Exercise #4: rows L2 band   Comment #4: x10  Exercise #5: shoulder extension L2 band (gave yellow band also)  Comment #5: x10  Exercise #6: corner pec stretch  Comment #6: 30\"  Exercise #7: scap retraction x10  Comment #7: shoulder ER left UE with yellow band x10        Treatment Today   TREATMENT MINUTES COMMENTS   Evaluation     Self-care/ Home management     Manual therapy     Neuromuscular Re-education Not today  Will re assess next visit. Kinesiotape to L shoulder    Therapeutic Activity     Therapeutic Exercises 15 flow sheet or above     Added scap retraction  ER with yellow tband  Doorway stretch   Gait training     Modality________ultrasound__________ 11 (8' + 3' set up) US to left ant/posterior shoulder @ 1 MHZ, 100% 1.0 w/cm2     Seated with left arm propped on 2 pillows              Total 26    Blank areas are intentional and mean the treatment did not include these items.       Bela Mullins, PT  12/19/2019  "

## 2021-06-04 NOTE — PROGRESS NOTES
[facility] Daily Progress     Patient Name: Ariana Hodge  Date: 2019  Visit #: 5  PTA visit #:  3  Referral Diagnosis: Acute pain of Left shoulder  Referring provider: Isabella Garsia*  Visit Diagnosis:     ICD-10-CM    1. Acute pain of left shoulder M25.512    2. Impingement syndrome of left shoulder M75.42    3. Decreased ROM of left shoulder M25.612    4. Decreased ROM of intervertebral discs of cervical spine M53.82          Assessment:   Symptoms consistent with impingement left shoulder and poor posture, but improving.  Pt with increased L shoulder flexion and abduction. Pt with a slight decrease in L shoulder ER.  Patient is benefitting from skilled physical therapy and is making steady progress toward functional goals.  Patient is appropriate to continue with skilled physical therapy intervention, as indicated by initial plan of care.    Goal Status:  Pt. will demonstrate/verbalize independence in self-management of condition in : 6 weeks;Progressing toward  Pt. will be independent with home exercise program in : 6 weeks  Pt. will report decreased intensity, frequency of : Pain;in 6 weeks;Comment;Met  Comment:: left shoulder to less than 2/10  Pt. will have improved quality of sleep: with less pain;waking less times/night;in 6 weeks;Progressing toward  Patient will reach / maintain arm movement: Progressing toward;in 6 weeks;with less pain  for other activity: doing pottery and lifting lay    Patient will decrease : SPADI score;by _ points;in 6 weeks  by ___ points: 5      Plan / Patient Education:       Continue with US to left anterior shoulder  Anticipate 2 more visits    Subjective:     Pt reports she was not able to do her exercises yesterday. Pt reports her L shoulder is stiff.   Pt reporting some pain into her L elbow and forward.   Pt continues to have mild pain with movement.  Pain Ratin      Objective:     Date: 12/3/19 12/26/19      Shoulder and Elbow ROM ( )     "AROM in degrees AROM in degrees AROM in degrees    Right Left Right Left Right Left   Shoulder Flexion (0-180 ) 152 70/120   140 degrees        Shoulder Abduction (0-180 ) 168 50/135   150 degrees        Shoulder Extension (0-60 ) 65 54   55 degree        Shoulder ER (0-90 ) 90 70   65 degrees        Shoulder IR (0-70 ) 90 90           Shoulder IR/Ext               Elbow Flexion (150 ) 150 150                 Exercises:  Exercise #1: shoulder pain management handout with exercises;  3x/day  Comment #1: pendulum  Exercise #2: supine shoulder flexion with hands clasped or with cane  Comment #2: shoulder ER with cane standing  Exercise #3: shoulder IR with cane behind back standing  Comment #3: doorway pec stretch-hands at elbow height 10-30 seconds 2x/day  Exercise #4: rows L2 band   Comment #4: x10  Exercise #5: shoulder extension L2 band (gave yellow band also)  Comment #5: x10  Exercise #6: corner pec stretch  Comment #6: 30\"  Exercise #7: scap retraction x10  Comment #7: shoulder ER left UE with yellow band x10        Treatment Today   TREATMENT MINUTES COMMENTS   Evaluation     Self-care/ Home management     Manual therapy     Neuromuscular Re-education Not today  Will re assess next visit. Kinesiotape to L shoulder    Therapeutic Activity     Therapeutic Exercises 15 flow sheet or above     Reviewed HEP   Gait training     Modality________ultrasound__________ 11 (8' + 3' set up) US to left ant/posterior shoulder @ 1 MHZ, 100% 1.0 w/cm2     Seated with left arm propped on 2 pillows              Total 26    Blank areas are intentional and mean the treatment did not include these items.       Erica Hackett, PTA,CLT  12/26/2019  "

## 2021-06-04 NOTE — TELEPHONE ENCOUNTER
Refill Approved    Rx renewed per Medication Renewal Policy. Medication was last renewed on 11/18/19.    Niukra Smiley, [facility] Triage/Med Refill 1/2/2020     Requested Prescriptions   Pending Prescriptions Disp Refills     losartan (COZAAR) 25 MG tablet [Pharmacy Med Name: Losartan Potassium Oral Tablet 25 MG] 90 tablet 0     Sig: Take 1 tablet (25 mg total) by mouth daily.       Angiotensin Receptor Blocker Protocol Passed - 12/30/2019  6:16 PM        Passed - PCP or prescribing provider visit in past 12 months       Last office visit with prescriber/PCP: 11/18/2019 Isabella Garsia MD OR same dept: 11/18/2019 Isabella Garsia MD OR same specialty: 11/18/2019 Isabella Garsia MD  Last physical: 8/2/2017 Last MTM visit: Visit date not found   Next visit within 3 mo: Visit date not found  Next physical within 3 mo: Visit date not found  Prescriber OR PCP: Isaeblla Iverson MD  Last diagnosis associated with med order: 1. Essential hypertension  - losartan (COZAAR) 25 MG tablet [Pharmacy Med Name: Losartan Potassium Oral Tablet 25 MG]; Take 1 tablet (25 mg total) by mouth daily.  Dispense: 90 tablet; Refill: 0    If protocol passes may refill for 12 months if within 3 months of last provider visit (or a total of 15 months).             Passed - Serum potassium within the past 12 months     Lab Results   Component Value Date    Potassium 3.5 02/26/2019             Passed - Blood pressure filed in past 12 months     BP Readings from Last 1 Encounters:   11/25/19 126/62             Passed - Serum creatinine within the past 12 months     Creatinine   Date Value Ref Range Status   02/26/2019 0.87 0.60 - 1.10 mg/dL Final

## 2021-06-04 NOTE — PROGRESS NOTES
"[facility] Daily Progress     Patient Name: Ariana Hodge  Date: 12/5/2019  Visit #: 2  PTA visit #:  1  Referral Diagnosis: Acute papin of Left shoulder  Referring provider: Isabella Garsia*  Visit Diagnosis:     ICD-10-CM    1. Acute pain of left shoulder M25.512    2. Impingement syndrome of left shoulder M75.42    3. Decreased ROM of left shoulder M25.612    4. Decreased ROM of intervertebral discs of cervical spine M53.82          Assessment:     Patient is benefitting from skilled physical therapy and is making steady progress toward functional goals.  Patient is appropriate to continue with skilled physical therapy intervention, as indicated by initial plan of care.    Goal Status:On going  Pt. will demonstrate/verbalize independence in self-management of condition in : 6 weeks  Pt. will be independent with home exercise program in : 6 weeks  Pt. will report decreased intensity, frequency of : Pain;in 6 weeks;Comment  Comment:: left shoulder to less than 2/10  Pt. will have improved quality of sleep: with less pain;waking less times/night;in 6 weeks  Patient will reach / maintain arm movement: for other activity;in 6 weeks;with less pain;Comment  for other activity: doing pottery and lifting lay    Patient will decrease : SPADI score;by _ points;in 6 weeks  by ___ points: 5      Plan / Patient Education:     Continue with initial plan of care.  Progress with home program as tolerated.   Go over HEP. Progress per patient's tolerance.    Subjective:   Pt reports her shoulder feels \"terrible.\" Pt did not do the exercises yesterday. Pt thinks she got some food poisoning.  Pt reports her shoulder was very sore last night when trying to sleep.  Pt reports the ultrasound felt good.   Pain Rating: 3-4        Objective:     L shoulder ROM:  Flexion: 130 degrees painful arc  AB: 165 degrees pain at 65 degrees  ER: 68 degrees  IR: Thumb to T9 ERP      Treatment Today   TREATMENT MINUTES COMMENTS "   Evaluation     Self-care/ Home management     Manual therapy     Neuromuscular Re-education 2 Kinesiotape to L shoulder    Therapeutic Activity     Therapeutic Exercises 12 flow sheet or above   Added to HEP:  -rows L2 band   -shoulder extension L2  -corner pec stretch   Gait training     Modality________ultrasound__________ 11 (8' + 3' set up) US to left ant/posterior shoulder @ 1 MHZ, 100% 1.0 w/cm2               Total 25    Blank areas are intentional and mean the treatment did not include these items.       Erica Neil,CLT  12/5/2019

## 2021-06-04 NOTE — PROGRESS NOTES
"[facility] Daily Progress     Patient Name: Ariana Hodge  Date: 2019  Visit #: 3  PTA visit #:  2  Referral Diagnosis: Acute papin of Left shoulder  Referring provider: Isabella Garsia*  Visit Diagnosis:     ICD-10-CM    1. Acute pain of left shoulder M25.512    2. Impingement syndrome of left shoulder M75.42    3. Decreased ROM of left shoulder M25.612    4. Decreased ROM of intervertebral discs of cervical spine M53.82          Assessment:   Pt with increased L shoulder AROM.  Tender L superior shoulder.  Patient is benefitting from skilled physical therapy and is making steady progress toward functional goals.  Patient is appropriate to continue with skilled physical therapy intervention, as indicated by initial plan of care.    Goal Status:On going  Pt. will demonstrate/verbalize independence in self-management of condition in : 6 weeks  Pt. will be independent with home exercise program in : 6 weeks  Pt. will report decreased intensity, frequency of : Pain;in 6 weeks;Comment  Comment:: left shoulder to less than 2/10  Pt. will have improved quality of sleep: with less pain;waking less times/night;in 6 weeks  Patient will reach / maintain arm movement: for other activity;in 6 weeks;with less pain;Comment  for other activity: doing pottery and lifting lay    Patient will decrease : SPADI score;by _ points;in 6 weeks  by ___ points: 5      Plan / Patient Education:     Continue with initial plan of care.  Progress with home program as tolerated.   Go over HEP. Progress per patient's tolerance.  Pt to ice.  Pt declined the K tape today. Re assess next visit.    Subjective:   Pt reports her left shoulder is feeling much better. Pt reports the pain is constant \"not terrible.\"  Pt feels that the exercises are helpful. Pt is not sure if the ultrasound helped.   Pt reporting L scapular and L neck pain.  Pain Ratin        Objective:     L shoulder ROM:  Flexion: 142 degrees   AB: 170 " "degrees pain at 65 degrees  ER: 68 degrees  IR: Thumb to T7 ERP    Exercises:  Exercise #1: shoulder pain management handout with exercises;  3x/day  Comment #1: pendulum  Exercise #2: supine shoulder flexion with hands clasped or with cane  Comment #2: shoulder ER with cane standing  Exercise #3: shoulder IR with cane behind back standing  Comment #3: add doorway stretch for pectoral muscle and wall slide for left arm flexion stretch  Exercise #4: rows L2 band   Comment #4: x10  Exercise #5: shoulder extension L2 band   Comment #5: x10  Exercise #6: corner pec stretch  Comment #6: 30\"        Treatment Today   TREATMENT MINUTES COMMENTS   Evaluation     Self-care/ Home management     Manual therapy     Neuromuscular Re-education Not today  Will re assess next visit. Kinesiotape to L shoulder    Therapeutic Activity     Therapeutic Exercises 15 flow sheet or above         UBE x2' pt stopped at 2' due to pain   Gait training     Modality________ultrasound__________ 11 (8' + 3' set up) US to left ant/posterior shoulder @ 1 MHZ, 100% 1.0 w/cm2               Total 27    Blank areas are intentional and mean the treatment did not include these items.       Erica Neil,WHITNEYT  12/17/2019  "

## 2021-06-05 NOTE — PROGRESS NOTES
HISTORY OF PRESENT ILLNESS  Patient reports that she has a piece of her hearing aid stuck in the left ear. She is not having pain or discomfort. No hearing loss. No vertigo.    REVIEW OF SYSTEMS  Review of Systems: a 10-system review was performed. Pertinent positives are noted in the HPI and on a separate scanned document in the chart.    PMH, PSH, FH and SH has documented in the EHR.      EXAM    CONSTITUTIONAL  General Appearance:  Normal, well developed, well nourished, no obvious distress  Ability to Communicate:  communicates appropriately.    HEAD AND FACE  Appearance and Symmetry:  Normal, no scalp or facial scarring or suspicious lesions.    EARS  Clinical speech reception threshold:  Normal, able to hear normal speech.  Auricle:  Normal, Auricles without scars, lesions, masses.  External auditory canal:  Right ear normal. Left ear reveals a wax trap in the canal. The wax trap was removed under otomicroscopy using microdissection technique.  Tympanic membrane:  Normal, Tympanic membranes normal without swelling or erythema.    RESPIRATORY  Symmetry and Respiratory effort:  Normal, Symmetric chest movement and expansion with no increased intercostal retractions or use of accessory muscles.     IMPRESSION  Foreign body left ear.    RECOMMENDATION  Follow up as needed.    Catracho Mares MD

## 2021-06-05 NOTE — PROGRESS NOTES
[facility] Daily Progress     Patient Name: Ariana Hodge  Date: 1/10/2020  Visit #: 7/8 (up to 20 per [facility])  PTA visit #:  4  Referral Diagnosis: Acute pain of Left shoulder  Referring provider: Isabella Garsia*  Visit Diagnosis:     ICD-10-CM    1. Acute pain of left shoulder M25.512    2. Impingement syndrome of left shoulder M75.42    3. Decreased ROM of left shoulder M25.612    4. Decreased ROM of intervertebral discs of cervical spine M53.82          Assessment:   Symptoms consistent with impingement left shoulder and poor posture, Improving however.  Increased AROM today compared to initial PT visit of left shoulder.  Patient is benefitting from skilled physical therapy and is making steady progress toward functional goals.  Patient is appropriate to continue with skilled physical therapy intervention, as indicated by initial plan of care.    Goal Status:  Pt. will demonstrate/verbalize independence in self-management of condition in : 6 weeks;Progressing toward  Pt. will be independent with home exercise program in : Progressing toward;Comment  Comment:: needs verbal cues for some exercises  Pt. will report decreased intensity, frequency of : Pain;in 6 weeks;Comment;Met  Comment:: left shoulder to less than 2/10  Pt. will have improved quality of sleep: with less pain;waking less times/night;in 6 weeks;Progressing toward  Patient will reach / maintain arm movement: Progressing toward;in 6 weeks;with less pain  for other activity: doing pottery and lifting lay    Patient will decrease : SPADI score;by _ points;in 6 weeks  by ___ points: 5    History of Present Illness:    Ariana is a 74 y.o. female who presents to therapy  with complaints of left shoulder pain. Date of onset/duration of symptoms is November, 2019. Onset was sudden and present when she woke one morning. Symptoms are constant and getting better. She denies history of similar symptoms, however does recall having some left  "mid-lateral arm pain prior to this onset of acute episode. 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  Does not do any exercise program at this time.  Reports she has a high pain tolerance.   Also reports that she has had some left hand parasthesias prior to this onset of left shoulder pain.  She is left and right handed.     Initial Eval Pain Ratin  Pain rating at best: 2  Pain rating at worst: 10  Pain description: soreness  Plan / Patient Education:     Patient to increase Cold pack usage, self massage trial, continue with exercises.  Review new ex of supine wand/self assisted shoulder flexion stretch  Patient to utilize home TENS unit for pain prn.    One more visit then DC to home program.  SPADI    Subjective:     Pain Ratin-2/10 anterior left shoulder  \" It was fine when I woke up, in the last several minutes it started.  Overall better   Pain variable  Yesterday did take pain med when she woke.  Sleeping on side possibly aggravated (left side)  Pt reports that if she doesn't do her exercises her shoulder gets stiff and sore.  Pt reports her shoulder is more sore in the morning and the evening.     Objective:     Date: 12/3/19 12/26/19  2020    Shoulder and Elbow ROM ( )    AROM in degrees AROM in degrees AROM in degrees    Right Left Right Left Right Left   Shoulder Flexion (0-180 ) 152 70/120   140 degrees    140 degrees    Shoulder Abduction (0-180 ) 168 50/135   150 degrees    150 degrees    Shoulder Extension (0-60 ) 65 54   55 degree        Shoulder ER (0-90 ) 90 70   65 degrees    65 degrees    Shoulder IR (0-70 ) 90 90           Shoulder IR/Ext               Elbow Flexion (150 ) 150 150              Initial 12/3/19  1/10/2020      Shoulder and Elbow ROM ( )    AROM in degrees AROM in degrees AROM in degrees    Right Left Right Left Right Left   Shoulder Flexion (0-180 ) 152 70/120   138      " "  Shoulder Abduction (0-180 ) 168 50/135   170        Shoulder Extension (0-60 ) 65 54   54        Shoulder ER (0-90 ) 90 70    80       Shoulder IR (0-70 ) 90 90    90       Shoulder IR/Ext               Elbow Flexion (150 ) 150 150           Elbow Extension (0 ) 0 0             PROM in degrees PROM in degrees PROM in degrees     Right Left Right Left Right Left   Shoulder Flexion (0-180 )               Shoulder Abduction (0-180 )               Shoulder Extension (0-60 )               Shoulder ER (0-90 )               Shoulder IR (0-70 )               Elbow Flexion (150 )               Elbow Extension (0 )                      Exercises:  Exercise #1: shoulder pain management handout with exercises;  3x/day  Comment #1: pendulum  Exercise #2: supine shoulder flexion with hands clasped or with cane-gave handout for as patient has forgotten about this one  Comment #2: shoulder ER with cane standing  Exercise #3: shoulder IR with cane behind back standing  Comment #3: doorway pec stretch-hands at elbow height 10-30 seconds 2x/day  Exercise #4: rows L2 band   Comment #4: x10  Exercise #5: shoulder extension L2 band (has yellow also)  Comment #5: x10  Exercise #6: corner pec stretch-needed verbal cues to be closer to the wall  Comment #6: 30\"  Exercise #7: scap retraction x10  Comment #7: shoulder ER left UE with yellow band x10        Treatment Today   TREATMENT MINUTES COMMENTS   Evaluation     Self-care/ Home management     Manual therapy 8 MT with CFM to left ant/post shoulder seated (after US)   Neuromuscular Re-education n/a Kinesiotape to L shoulder    Therapeutic Activity     Therapeutic Exercises 15 flow sheet or above     Reviewed HEP   Gait training     Modality________ultrasound__________ 11 (8' + 3' set up) US to left anterior shoulder @ 1 MHZ, 100% 1.0 w/cm2     Seated with left arm propped on 2 pillows              Total 34    Blank areas are intentional and mean the treatment did not include these items. "       Bela Mullins, PT  1/10/2020

## 2021-06-05 NOTE — PROGRESS NOTES
[facility] Daily Progress     Patient Name: Ariana Hodge  Date: 1/24/2020  Visit #: 8/8 (up to 20 per [facility])  [facility] visit #:  4  Referral Diagnosis: Acute pain of Left shoulder  Referring provider: Isabella Garsia*  Visit Diagnosis:     ICD-10-CM    1. Acute pain of left shoulder M25.512    2. Impingement syndrome of left shoulder M75.42    3. Decreased ROM of left shoulder M25.612    4. Decreased ROM of intervertebral discs of cervical spine M53.82          Assessment:   Symptoms consistent with impingement left shoulder and poor posture, however with pain also reported in left arm today, possible cervical radicular symptoms.  Increased AROM today compared to initial PT visit of left shoulder.  Patient demonstrates understanding/independence with home program.  Patient is benefitting from skilled physical therapy and is making steady progress toward functional goals.  Patient leaving for vacation and will try to self manage symptoms on her own    Goal Status:  Pt. will demonstrate/verbalize independence in self-management of condition in : Partially Met;Comment  Comment:: gave info for theracane for left neck/shoulder pain-patient will try to purchase via [employer]  Pt. will be independent with home exercise program in : Partially met  Comment:: needs verbal cues for some exercises  Pt. will report decreased intensity, frequency of : Met  Comment:: left shoulder to less than 2/10  Pt. will have improved quality of sleep: Met  Patient will reach / maintain arm movement: Partially met  for other activity: doing pottery and lifting lay    Patient will decrease : SPADI score;by _ points;in 6 weeks;Met  by ___ points: 5; able to improve from 12 to 6    History of Present Illness:    Ariana is a 74 y.o. female who presents to therapy  with complaints of left shoulder pain. Date of onset/duration of symptoms is November, 2019. Onset was sudden and present when she woke one morning. Symptoms are constant  and getting better. She denies history of similar symptoms, however does recall having some left mid-lateral arm pain prior to this onset of acute episode. 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  Does not do any exercise program at this time.  Reports she has a high pain tolerance.   Also reports that she has had some left hand parasthesias prior to this onset of left shoulder pain.  She is left and right handed.     Initial Eval Pain Ratin  Pain rating at best: 2  Pain rating at worst: 10  Pain description: soreness  Plan / Patient Education:     Patient to obtain self-massager/theracane.  Patient to continue with shoulder rom ex and new neck stretches; watch posture.  Leaves for vacation for 1.5 weeks     DC to home program if does not return in 30 days or if symptoms worsen.      Subjective:     Pain Ratin-2/10 anterior left shoulder and into left arm today intermittently.  Had massage for neck and very helpful.    Overall better   Pain variable    Sleeping on side possibly aggravated (left side)  Pt reports that if she doesn't do her exercises her shoulder gets stiff and sore.  Pt reports her shoulder is more sore in the morning and the evening.     Objective:     Date: 12/3/19 12/26/19  2020    Shoulder and Elbow ROM ( )    AROM in degrees AROM in degrees AROM in degrees    Right Left Right Left Right Left   Shoulder Flexion (0-180 ) 152 70/120   140 degrees    140 degrees    Shoulder Abduction (0-180 ) 168 50/135   150 degrees    150 degrees    Shoulder Extension (0-60 ) 65 54   55 degree        Shoulder ER (0-90 ) 90 70   65 degrees    65 degrees    Shoulder IR (0-70 ) 90 90           Shoulder IR/Ext               Elbow Flexion (150 ) 150 150              Initial 12/3/19  1/10/2020      Shoulder and Elbow ROM ( )    AROM in degrees AROM in degrees AROM in degrees    Right Left Right Left Right Left    Shoulder Flexion (0-180 ) 152 70/120   138        Shoulder Abduction (0-180 ) 168 50/135   170        Shoulder Extension (0-60 ) 65 54   54        Shoulder ER (0-90 ) 90 70    80       Shoulder IR (0-70 ) 90 90    90       Shoulder IR/Ext               Elbow Flexion (150 ) 150 150           Elbow Extension (0 ) 0 0             PROM in degrees PROM in degrees PROM in degrees     Right Left Right Left Right Left   Shoulder Flexion (0-180 )               Shoulder Abduction (0-180 )               Shoulder Extension (0-60 )               Shoulder ER (0-90 )               Shoulder IR (0-70 )               Elbow Flexion (150 )               Elbow Extension (0 )                  Left shoulder flexion 135 standing; 160 with wall glide upward  Left shoulder abduction 145  IR-WNL    Exercises:  Exercise #1: shoulder pain management handout with exercises;  3x/day  Comment #1: pendulum  Exercise #2: supine shoulder flexion with hands clasped or with cane  Comment #2: shoulder ER with cane standing  Exercise #3: shoulder IR with cane behind back standing  Comment #3: doorway pec stretch-hands at elbow height 10-30 seconds 2x/day  Exercise #4: rows L2 band   Comment #4: x10  Exercise #5: shoulder extension L2 band (has yellow also)  Comment #5: added neck retraction 5x and neck extension with pillowcase behind neck 3x  Exercise #6: corner pec stretch  Comment #6: UBE seat at 4' x 3'  Exercise #7: scap retraction x10  Comment #7: shoulder ER left UE with yellow band x10        Treatment Today   TREATMENT MINUTES COMMENTS   Evaluation     Self-care/ Home management 8 Discussed home management, travel, theracane; managing neck and shoulder symptoms   Manual therapy 8 MT with CFM to left lower cervical region today   Neuromuscular Re-education n/a Kinesiotape to L shoulder    Therapeutic Activity     Therapeutic Exercises 22 flow sheet or above     Reviewed HEP   Gait training     Modality________ultrasound__________  US to left  anterior shoulder @ 1 MHZ, 100% 1.0 w/cm2     Seated with left arm propped on 2 pillows              Total 38    Blank areas are intentional and mean the treatment did not include these items.       Bela Mullins, PT  1/24/2020

## 2021-06-06 NOTE — PROGRESS NOTES
ASSESSMENT/PLAN:    Cough, worsening  74-year-old female with bronchitis comes in today as follow-up from urgency room visit.  Her cough is still persistent.  Due to her history of fever, I opted to treat her with antibiotic.  She pleaded the prednisone course given to her by the urgency room.  She will continue with Tessalon Perles and albuterol as needed.  I will also give her guaifenesin with codeine for nighttime to help her with sleep.  Call in 1 week if she is not better.  She verbalized understanding and agree with the plan  -     amoxicillin-clavulanate (AUGMENTIN) 875-125 mg per tablet; Take 1 tablet by mouth 2 (two) times a day for 10 days.  -     codeine-guaiFENesin (GUAIFENESIN AC)  mg/5 mL liquid; Take 10 mL by mouth 4 (four) times a day as needed.    CHIEF COMPLAINT:  Chief Complaint   Patient presents with     Follow-up     urgency room  2/19/20 for bronchitis. feels a little better, still coughing a lot     HISTORY OF PRESENT ILLNESS:  Ariana is a 74 y.o. female presenting to the clinic today for a productive cough. She was seen in the urgency room on 02/19/2020 for a cough that had been present since 02/17 and a fever of 100.4 degrees. She had a negative influenza swab and a negative chest x-ray. She was diagnosed with bronchitis and prescribed albuterol, tessalon pearls and a 4 day course of prednisone 20 mg. She finished the prednisone. She has been using the albuterol three times a day. Today, her symptoms have improved. She still has a productive cough which occurs almost constantly throughout the day. She denies any shortness of breath or wheezing. She has been using Mucinex. She recently traveled to [place], but she does not think she was exposed to anything.     REVIEW OF SYSTEMS:   Constitutional: Negative.   HENT: Negative.   Eyes: Negative.   Respiratory: Negative.   Cardiovascular: Negative.   Gastrointestinal: Negative.   Endocrine: Negative.   Genitourinary: Negative.    Musculoskeletal: Negative.   Skin: Negative.   Allergic/Immunologic: Negative.   Neurological: Negative.   Hematological: Negative.   Psychiatric/Behavioral: Negative.   All other systems are negative.    TOBACCO USE:  Social History     Tobacco Use   Smoking Status Never Smoker   Smokeless Tobacco Never Used       VITALS:  Vitals:    02/26/20 1111   BP: 138/86   Patient Site: Left Arm   Patient Position: Sitting   Cuff Size: Adult Regular   Pulse: 63   Temp: 97.9  F (36.6  C)   SpO2: 96%   Weight: 154 lb 7 oz (70.1 kg)     Wt Readings from Last 3 Encounters:   02/26/20 154 lb 7 oz (70.1 kg)   11/18/19 156 lb 2 oz (70.8 kg)   03/04/19 155 lb 4.8 oz (70.4 kg)       PHYSICAL EXAM:  Constitutional: Patient is oriented to person, place, and time. Patient appears well-developed and well-nourished. No distress.   Head: Normocephalic and atraumatic.   Right Ear: External ear normal.   Left Ear: External ear normal.   Nose: Nose normal.   Mouth/Throat: Oropharynx is clear and moist. No oropharyngeal exudate.   Eyes: Conjunctivae and EOM are normal. Pupils are equal, round, and reactive to light. Right eye exhibits no discharge. Left eye exhibits no discharge. No scleral icterus.   Neck: Neck supple. No JVD present. No tracheal deviation present. No thyromegaly present.   Cardiovascular: Normal rate, regular rhythm, normal heart sounds and intact distal pulses. No murmur heard.   Pulmonary/Chest: Effort normal. She has coarse breath sounds. No stridor. Patient has no wheezes, no rales, exhibits no tenderness.   Neurological: Patient is alert and oriented to person, place, and time. Patient has normal reflexes. No cranial nerve deficit. Coordination normal.   Skin: Skin is warm and dry. No rash noted. Patient is not diaphoretic. No erythema. No pallor.    ADDITIONAL HISTORY SUMMARIZED (2): Reviewed urgency room note from 02/19/2020 about bronchitis.   DECISION TO OBTAIN EXTRA INFORMATION (1): None.   RADIOLOGY TESTS (1):  CXR.  LABS (1): results.  MEDICINE TESTS (1): None.  INDEPENDENT REVIEW (2 each): None.     ISonia, am scribing for and in the presence of, Dr. Kim.    I, Dr. Kim, personally performed the services described in this documentation, as scribed by Sonia James in my presence, and it is both accurate and complete.    MEDICATIONS:  Current Outpatient Medications   Medication Sig Dispense Refill     albuterol (PROAIR HFA;PROVENTIL HFA;VENTOLIN HFA) 90 mcg/actuation inhaler Inhale 2 puffs.       atorvastatin (LIPITOR) 20 MG tablet TAKE ONE TABLET BY MOUTH AT BEDTIME 90 tablet 2     benzonatate (TESSALON) 100 MG capsule Take 1 capsule by mouth 3 times daily if needed for Cough for up to 7 days.       citalopram (CELEXA) 20 MG tablet TAKE ONE TABLET BY MOUTH ONE TIME DAILY 90 tablet 2     ketoconazole (NIZORAL) 2 % shampoo Use every 3 to 4 days for up to 8 weeks; then apply only as needed to control dandruff/itchiness/rash. 120 mL 3     losartan (COZAAR) 25 MG tablet Take 1 tablet (25 mg total) by mouth daily. 90 tablet 0     amoxicillin-clavulanate (AUGMENTIN) 875-125 mg per tablet Take 1 tablet by mouth 2 (two) times a day for 10 days. 20 tablet 0     codeine-guaiFENesin (GUAIFENESIN AC)  mg/5 mL liquid Take 10 mL by mouth 4 (four) times a day as needed. 240 mL 0     fluocinonide (LIDEX) 0.05 % external solution        hydroCHLOROthiazide (HYDRODIURIL) 25 MG tablet Take 1 tablet (25 mg total) by mouth daily. 90 tablet 2     triamcinolone (KENALOG) 0.1 % cream   6     No current facility-administered medications for this visit.        Total data points:4

## 2021-06-07 NOTE — TELEPHONE ENCOUNTER
Last office visit for medication 11-18-19 was told to follow up in 1 month. Last time in clinic 2-26-20 for unrelated issue. Last written for the medication 07/1/19.

## 2021-06-08 NOTE — TELEPHONE ENCOUNTER
Refill request for medication: losartan  Last visit addressing this medication: 2/26/20  Follow up plan 6  months  Last refill on 1/2/20, quantity #90   Appointment: Not due     Tsering Posada CMA

## 2021-06-09 ENCOUNTER — AMBULATORY - HEALTHEAST (OUTPATIENT)
Dept: FAMILY MEDICINE | Facility: CLINIC | Age: 76
End: 2021-06-09

## 2021-06-09 DIAGNOSIS — I45.9 SKIPPED HEART BEATS: ICD-10-CM

## 2021-06-09 NOTE — TELEPHONE ENCOUNTER
Refill Approved    Rx renewed per Medication Renewal Policy. Medication was last renewed on 7/1/19.    Niurka Smiley, [facility] Triage/Med Refill 6/22/2020     Requested Prescriptions   Pending Prescriptions Disp Refills     atorvastatin (LIPITOR) 20 MG tablet [Pharmacy Med Name: Atorvastatin Calcium Oral Tablet 20 MG] 90 tablet 0     Sig: TAKE ONE TABLET BY MOUTH AT BEDTIME       Statins Refill Protocol (Hmg CoA Reductase Inhibitors) Passed - 6/21/2020  7:00 AM        Passed - PCP or prescribing provider visit in past 12 months      Last office visit with prescriber/PCP: 2/26/2020 Isabella Garsia MD OR same dept: 2/26/2020 Isabella Garsia MD OR same specialty: 2/26/2020 Isabella Garsia MD  Last physical: 8/2/2017 Last MTM visit: Visit date not found   Next visit within 3 mo: Visit date not found  Next physical within 3 mo: Visit date not found  Prescriber OR PCP: Isabella Iverson MD  Last diagnosis associated with med order: 1. Dyslipidemia  - atorvastatin (LIPITOR) 20 MG tablet [Pharmacy Med Name: Atorvastatin Calcium Oral Tablet 20 MG]; TAKE ONE TABLET BY MOUTH AT BEDTIME  Dispense: 90 tablet; Refill: 0    2. Mood disorder (H)  - citalopram (CELEXA) 20 MG tablet [Pharmacy Med Name: Citalopram Hydrobromide Oral Tablet 20 MG]; TAKE ONE TABLET BY MOUTH ONE TIME DAILY  Dispense: 90 tablet; Refill: 0    3. Anxiety  - citalopram (CELEXA) 20 MG tablet [Pharmacy Med Name: Citalopram Hydrobromide Oral Tablet 20 MG]; TAKE ONE TABLET BY MOUTH ONE TIME DAILY  Dispense: 90 tablet; Refill: 0    If protocol passes may refill for 12 months if within 3 months of last provider visit (or a total of 15 months).                citalopram (CELEXA) 20 MG tablet [Pharmacy Med Name: Citalopram Hydrobromide Oral Tablet 20 MG] 90 tablet 0     Sig: TAKE ONE TABLET BY MOUTH ONE TIME DAILY       SSRI Refill Protocol  Passed - 6/21/2020  7:00 AM        Passed - PCP or prescribing  provider visit in last year     Last office visit with prescriber/PCP: 2/26/2020 Isabella Garsia MD OR same dept: 2/26/2020 Isabella Garsia MD OR same specialty: 2/26/2020 Isabella Garsia MD  Last physical: 8/2/2017 Last MTM visit: Visit date not found   Next visit within 3 mo: Visit date not found  Next physical within 3 mo: Visit date not found  Prescriber OR PCP: Isabella Iverson MD  Last diagnosis associated with med order: 1. Dyslipidemia  - atorvastatin (LIPITOR) 20 MG tablet [Pharmacy Med Name: Atorvastatin Calcium Oral Tablet 20 MG]; TAKE ONE TABLET BY MOUTH AT BEDTIME  Dispense: 90 tablet; Refill: 0    2. Mood disorder (H)  - citalopram (CELEXA) 20 MG tablet [Pharmacy Med Name: Citalopram Hydrobromide Oral Tablet 20 MG]; TAKE ONE TABLET BY MOUTH ONE TIME DAILY  Dispense: 90 tablet; Refill: 0    3. Anxiety  - citalopram (CELEXA) 20 MG tablet [Pharmacy Med Name: Citalopram Hydrobromide Oral Tablet 20 MG]; TAKE ONE TABLET BY MOUTH ONE TIME DAILY  Dispense: 90 tablet; Refill: 0    If protocol passes may refill for 12 months if within 3 months of last provider visit (or a total of 15 months).

## 2021-06-10 NOTE — TELEPHONE ENCOUNTER
Referral Request  Type of referral: Surgery  Who s requesting: Patient  Why the request: The patient states she would like a referral to a surgeon to have her hemorrhoids repaired as soon as possible.The patient states she has severe bleeding and pain which she has used all sorts of over the counter products and creams without relief.  Have you been seen for this request: Yes  Does patient have a preference on a group/provider? The patient states years ago she had a referral to surgery but never went because the hemorrhoids got better.  Okay to leave a detailed message?  Yes

## 2021-06-10 NOTE — TELEPHONE ENCOUNTER
RN cannot approve Refill Request    RN can NOT refill this medication Protocol failed and NO refill given. Last office visit: 2/26/2020 Isabella Garsia MD Last Physical: 8/2/2017 Last MTM visit: Visit date not found Last visit same specialty: 2/26/2020 Isabella Garsia MD.  Next visit within 3 mo: Visit date not found  Next physical within 3 mo: Visit date not found      Niurka Smiley, [facility] Triage/Med Refill 8/25/2020    Requested Prescriptions   Pending Prescriptions Disp Refills     losartan (COZAAR) 25 MG tablet [Pharmacy Med Name: Losartan Potassium Oral Tablet 25 MG] 90 tablet 0     Sig: TAKE ONE TABLET BY MOUTH ONE TIME DAILY       Angiotensin Receptor Blocker Protocol Failed - 8/23/2020  2:00 AM        Failed - Serum potassium within the past 12 months     No results found for: LN-POTASSIUM          Failed - Serum creatinine within the past 12 months     Creatinine   Date Value Ref Range Status   02/26/2019 0.87 0.60 - 1.10 mg/dL Final             Passed - PCP or prescribing provider visit in past 12 months       Last office visit with prescriber/PCP: 2/26/2020 Isabella Garsia MD OR same dept: 2/26/2020 Isabella Garsia MD OR same specialty: 2/26/2020 Isabella Garsia MD  Last physical: 8/2/2017 Last MTM visit: Visit date not found   Next visit within 3 mo: Visit date not found  Next physical within 3 mo: Visit date not found  Prescriber OR PCP: Isabella Iverson MD  Last diagnosis associated with med order: 1. Essential hypertension  - losartan (COZAAR) 25 MG tablet [Pharmacy Med Name: Losartan Potassium Oral Tablet 25 MG]; TAKE ONE TABLET BY MOUTH ONE TIME DAILY   Dispense: 90 tablet; Refill: 0    If protocol passes may refill for 12 months if within 3 months of last provider visit (or a total of 15 months).             Passed - Blood pressure filed in past 12 months     BP Readings from Last 1 Encounters:   02/26/20  138/86

## 2021-06-10 NOTE — TELEPHONE ENCOUNTER
RN cannot approve Refill Request    RN can NOT refill this medication Protocol failed and NO refill given. Last office visit: 2/26/2020 Isabella Garsia MD Last Physical: 8/2/2017 Last MTM visit: Visit date not found Last visit same specialty: 2/26/2020 Isabella Garsia MD.  Next visit within 3 mo: Visit date not found  Next physical within 3 mo: Visit date not found      Coleen Barger, [facility] Triage/Med Refill 8/7/2020    Requested Prescriptions   Pending Prescriptions Disp Refills     hydroCHLOROthiazide (HYDRODIURIL) 25 MG tablet [Pharmacy Med Name: hydroCHLOROthiazide Oral Tablet 25 MG] 90 tablet 0     Sig: TAKE ONE TABLET BY MOUTH ONE TIME DAILY       Diuretics/Combination Diuretics Refill Protocol  Failed - 8/7/2020  7:41 PM        Failed - Serum Potassium in past 12 months      No results found for: LN-POTASSIUM          Failed - Serum Sodium in past 12 months      No results found for: LN-SODIUM          Failed - Serum Creatinine in past 12 months      Creatinine   Date Value Ref Range Status   02/26/2019 0.87 0.60 - 1.10 mg/dL Final             Passed - Visit with PCP or prescribing provider visit in past 12 months     Last office visit with prescriber/PCP: 2/26/2020 Isabella Garsia MD OR same dept: 2/26/2020 Isabella Garsia MD OR same specialty: 2/26/2020 Isabella Garsia MD  Last physical: 8/2/2017 Last MTM visit: Visit date not found   Next visit within 3 mo: Visit date not found  Next physical within 3 mo: Visit date not found  Prescriber OR PCP: Isabella Iverson MD  Last diagnosis associated with med order: 1. Essential hypertension  - hydroCHLOROthiazide (HYDRODIURIL) 25 MG tablet [Pharmacy Med Name: hydroCHLOROthiazide Oral Tablet 25 MG]; TAKE ONE TABLET BY MOUTH ONE TIME DAILY   Dispense: 90 tablet; Refill: 0    If protocol passes may refill for 12 months if within 3 months of last provider visit (or a total of  15 months).             Passed - Blood pressure on file in past 12 months     BP Readings from Last 1 Encounters:   02/26/20 138/86

## 2021-06-11 NOTE — TELEPHONE ENCOUNTER
Raised temperature of 99.5 at provider appointment on Friday.   She had diarrhea for a number of days.   Woke up Friday night with aches and pains.   Saturday the aches and pains were gone.   101.8 temperature on Saturday. Pt has had fatigue. Chills and sweats last night.   Slight headache today but pt states that is about it.     Per protocol recommendation is virtual visit with provider to discuss sx.     RN transferred to scheduling to make phone visit with urgent care provider tonight.     Patient stated understanding and agreed to plan.     Luanne Machado RN 08/30/20 5:59 PM  [facility] Nurse Advisor      Reason for Disposition    HIGH RISK patient (e.g., age > 64 years, diabetes, heart or lung disease, weak immune system)    Additional Information    Negative: SEVERE difficulty breathing (e.g., struggling for each breath, speaks in single words)    Negative: Difficult to awaken or acting confused (e.g., disoriented, slurred speech)    Negative: Bluish (or gray) lips or face now    Negative: Shock suspected (e.g., cold/pale/clammy skin, too weak to stand, low BP, rapid pulse)    Negative: Sounds like a life-threatening emergency to the triager    Negative: [1] COVID-19 exposure AND [2] no symptoms    Negative: COVID-19 and Breastfeeding, questions about    Negative: [1] Adult with possible COVID-19 symptoms AND [2] triager concerned about severity of symptoms or other causes    Negative: Patient sounds very sick or weak to the triager    Negative: SEVERE or constant chest pain or pressure (Exception: mild central chest pain, present only when coughing)    Negative: MODERATE difficulty breathing (e.g., speaks in phrases, SOB even at rest, pulse 100-120)    Negative: MILD difficulty breathing (e.g., minimal/no SOB at rest, SOB with walking, pulse <100)    Negative: Chest pain or pressure    Negative: [1] Fever > 100.0 F (37.8 C) AND [2] bedridden (e.g., nursing home patient, CVA, chronic illness,  recovering from surgery)    Negative: [1] Fever > 101 F (38.3 C) AND [2] age > 60    Negative: Fever > 103 F (39.4 C)    Protocols used: CORONAVIRUS (COVID-19) DIAGNOSED OR [id number]-ZA 5.16.20

## 2021-06-11 NOTE — PROGRESS NOTES
[facility]   Vestibular Initial Evaluation    Patient Name: Ariana Hodge  Date of evaluation: 6/19/2017  Referral Diagnosis: Dizziness  Referring provider: Landry, Dhaval Comer,*  Visit Diagnosis:     ICD-10-CM    1. Dizziness R42    2. Unsteady gait R26.81    3. BPPV (benign paroxysmal positional vertigo), unspecified laterality H81.10        Assessment:      Impairments in  posture, coordination, gait/locomotion and balance  Patient's signs and symptoms are consistent with unilateral peripheral vertigo possible BPPV.  Patient responded well to initiation of HEP.  Prognosis to achieve goals is  fair   Pt. is appropriate for skilled PT intervention as outlined in the Plan of Care (POC).    Goals:  Pt. will demonstrate/verbalize independence in self-management of condition in : 4 weeks  Pt. will be independent with home exercise program in : 4 weeks  Pt. will show improved balance for safer : ambulation;for household ambulation;in 4 weeks  Pt. will improve posture : and demonstrate posture with minimal to no cuing;in 4 weeks  Patient will transfer: sit/stand;supine/sit;with less difficulty;in 4 weeks (without dizziness)  No Data Recorded    Patient's expectations/goals are realistic.    Barriers to Learning or Achieving Goals:  No Barriers.       Plan / Patient Instructions:        Plan of Care:   Authorization / Certification Start Date: 06/19/17  Authorization / Certification End Date: 09/17/17  Authorization / Certification Number of Visits: 12  Communication with: Referral Source  Patient Related Instruction: Nature of Condition;Treatment plan and rationale;Self Care instruction;Basis of treatment;Expected outcome;Next steps;Precautions;Posture;Body mechanics  Times per Week: 2  Number of Weeks: 6  Number of Visits: 12  Discharge Planning:    Precautions / Restrictions :  atypical symptoms and occasional feeling of electricity in her head, but patient is unsure if she twitches or  not.  Therapeutic Exercise: ROM;Stretching;Strengthening  Neuromuscular Reeducation: posture;balance/proprioception;core  Manual Therapy: soft tissue mobilization;myofascial release;joint mobilization;muscle energy  Gait Training:    Canolith Repostioning: prn    POC and pathology of condition were reviewed with patient.  Pt. is in agreement with the Plan of Care  A Home Exercise Program (HEP) was initiated today.  Pt. was instructed in exercises by PT and patient was given a handout with detailed instructions.    Plan for next visit: re-test balance, possible hallpike-issa.     Subjective:         History of Present Illness:    Ariana is a 71 y.o. female who presents to therapy today with complaints of dizziness without feeling of spinning. Date of onset:  2016. Onset was sudden and after a surgical procedure on her ear. Symptoms are intermittent and not improving. She denies history of similar symptoms. She describes their previous level of function as not limited    Pain Ratin    Functional limitations are described as occurring with:   ascending and descending stairs or curbs  balance  bending  transitional movements sit to stand and sit to supine  walking           Objective:      Note: Items left blank indicates the item was not performed or not indicated at the time of the evaluation.     Vestibular Disorder Examination  1. Dizziness     2. Unsteady gait     3. BPPV (benign paroxysmal positional vertigo), unspecified laterality       Precautions/Restrictions: None  Involved side: Bilateral  Posture Observation:      Cervical:  Moderate forward head  Shoulder/Thoracic complex: Moderate bilateral scapular protraction   Moderately increased upper thoracic kyphosis    Sensation: Not Tested    ROM:  LE Screen WNL    Strength: LE screen WNL except for bilateral hip abduction/adduction and hamstrings    Balance Assessment:  Rhomberg - Eyes Open:  60 seconds  Rhomberg - Eyes Closed:  60 seconds  Sharpened Rhomberg  "- Eyes Open:  R in front 0 seconds; L in front 5 seconds    Single Leg Stance:  5 on right and 2 on left seconds    Functional Mobility: good     Oculomotor Assessment:  Vertebral Basilar Artery-modified:   Normal  Ocular AROM:   Normal  Smooth Pursuit:   Normal  Saccades:   Normal  VOR Suppression:   Normal    Positional Tests:  Hallpike Right:  NT  Hallpike Left:  NT  Supine Head Center:  complaint of mild dizziness without nystagmus   Head Roll Right: complaint of mild dizziness without nystagmus  Head Roll Left:  complain of mild dizziness without nystagmus.    Exercises:  Exercise #1: VOR gaze stabilzation  Comment #1: 30\" horizontal.  Exercise #2: feet together eyes closed  Comment #2: 60\"  Exercise #3: partial tandem stance  Comment #3: up to 60 seconds      Treatment Today     TREATMENT MINUTES COMMENTS   Evaluation 30    Self-care/ Home management     Manual therapy     Neuromuscular Re-education 15 See exercise flow-sheet for details.    Therapeutic Activity     Therapeutic Exercises     Gait training     Modality__________________     Canalith repositioning maneuver           Total 45    Blank areas are intentional and mean the treatment did not include these items.     PT Evaluation Code: (Please list factors)  Patient History/Comorbidities: LBP, dyslipidemia,  Examination: dizziness, LOB with transitions of posture,   Clinical Presentation: stable   Clinical Decision Making: low    Patient History/  Comorbidities Examination  (body structures and functions, activity limitations, and/or participation restrictions) Clinical Presentation Clinical Decision Making (Complexity)   No documented Comorbidities or personal factors 1-2 Elements Stable and/or uncomplicated Low   1-2 documented comorbidities or personal factor 3 Elements Evolving clinical presentation with changing characteristics Moderate   3-4 documented comorbidities or personal factors 4 or more Unstable and unpredictable High          "     Micha Fox, PT  6/19/2017  2:53 PM        [facility] Discharge Summary  Patient Name: Ariana Hodge  Date: 10/2/2017  Referral Diagnosis: Dizziness  Referring provider: Dhaval Alatorre,*  Visit Diagnosis:   1. Dizziness     2. Unsteady gait     3. BPPV (benign paroxysmal positional vertigo), unspecified laterality           Patient was seen for 1 visits physical therapy.    The patient attended therapy initially, but did not finish the therapy sessions prescribed.  Goals were not fully achieved. Explanation for goals not achieved: The patient discontinued therapy, did not return.    Therapy will be discontinued at this time.  Please see progress note dated 6/19/17 for patient status.      Thank you for your referral.  Micha Fox, PT  10/2/2017  2:31 PM

## 2021-06-12 NOTE — PROGRESS NOTES
ASSESSMENT/PLAN:  1. Wellness examination  Home safety information was reviewed with the patient.  We discussed prevention of fall, causes of falls, regular checkup with vision and hearing, be mindful about the pain toenail care, over-the-counter medications and their side effects, using any assisted walking devices, adequate shoes and feet wear, avoiding loose rugs or items on the floor that may cause the patient to drip, safety bars in the bathtub, shower, toilet area, keeping the body in good shape with regular exercise especially walking, limiting alcohol intake.  Social history was reviewed with the patient today.    Patient is independent with activities of daily living  We reviewed active symptoms and discussed management  We reviewed list of healthcare providers for this patient.  We also review PHQ 9 score of 3    2. Dyslipidemia  - Lipid Cascade  - Glucose  - atorvastatin (LIPITOR) 20 MG tablet; TAKE 1 TABLET BY MOUTH EVERY NIGHT AT BEDTIME  Dispense: 90 tablet; Refill: 3    3. Exertional dyspnea  Will await lab results from previous test were done recently from [email or web address]  May need echocardiogram    4. Right ankle pain  Without swelling or h/o trauma  Continue with conservative therapy.  Call if not better in 2-4 wks    5. Mood disorder  refilled  - citalopram (CELEXA) 20 MG tablet; TAKE ONE TABLET BY MOUTH ONCE DAILY  Dispense: 90 tablet; Refill: 3    6. Anxiety  refilled  - citalopram (CELEXA) 20 MG tablet; TAKE ONE TABLET BY MOUTH ONCE DAILY  Dispense: 90 tablet; Refill: 3      Orders Placed This Encounter   Procedures     Lipid Cascade     Order Specific Question:   Fasting is required?     Answer:   Yes     Glucose     Order Specific Question:   Has the patient been fasting at least 8 hours?     Answer:   Yes           CHIEF COMPLAINT:  Chief Complaint   Patient presents with     Annual Wellness       HISTORY OF PRESENT ILLNESS:  Ariana is a 71 y.o. female here today for an Annual Wellness Exam. She  is overall feeling well.     Health Maintenance: Her blood pressure is elevated today at 144/62. Pulse is within normal limits.     Ankle Pain: She thinks she has had a stress fracture in her ankle. 3 weeks ago, she was on her feet for 7 hours straight on a concrete floor. She had pain with weight bearing. No noticed swelling or bruising. The pain is located on the lateral side of the right ankle. The ankle is starting to feel better, but she is still having some intermittent pain.     Exertional Dyspnea: She has been having some increasing shortness of breath with exertion. She does not feel this at rest. She says that if she walks a few a steps or goes down the stairs, she feels like she needs to rest. She feels it even when she walks short distances.     REVIEW OF SYSTEMS:   She had a lipid panel done on 7/20/2017 with [facility], and it was lower than it usually is. She is fasting today for a cholesterol check. She continues the atorvastatin 20mg. She continues the citalopram 20mg, and is doing well on this. All other systems are negative.    PFSH:  No regular exercise.  She has a couple of glasses of wine per night.   No tobacco use.     Past Medical History:   Diagnosis Date     Skin cancer      Past Surgical History:   Procedure Laterality Date     IA REDUCTION OF LARGE BREAST      Description: Breast Surgery Reduction Procedure;  Proc Date: 09/01/2006;     REDUCTION MAMMAPLASTY Bilateral In the 2000's     No family history on file.  Social History     Social History     Marital status:      Spouse name: N/A     Number of children: N/A     Years of education: N/A     Occupational History     Not on file.     Social History Main Topics     Smoking status: Never Smoker     Smokeless tobacco: Never Used     Alcohol use Not on file     Drug use: Not on file     Sexual activity: Not on file     Other Topics Concern     Not on file     Social History Narrative       PHYSICAL EXAM:  Vitals:     08/02/17 1111 08/02/17 1143   BP: 144/62 132/68   Patient Site: Left Arm    Patient Position: Sitting    Cuff Size: Adult Regular    Pulse: 68    Weight: 154 lb 4 oz (70 kg)    Height: 5' (1.524 m)      Body mass index is 30.12 kg/(m^2).    Current providers: PCP- Dr. Kim, Dermatology- [place], ENT- Dr. Alatorre, [facility]-unknown provider    ADDITIONAL HISTORY SUMMARIZED (2): None.  DECISION TO OBTAIN EXTRA INFORMATION (1): None.   RADIOLOGY TESTS (1): None.  LABS (1): Reviewed and ordered labs today.  MEDICINE TESTS (1): None.  INDEPENDENT REVIEW (2 each): None.    The visit lasted a total of 26 minutes face to face with the patient. Over 50% of the time was spent counseling and educating the patient about health maintenance.    I, Lisette Pedraza, am scribing for and in the presence of, Dr. Kim.    I, Isabella Iverson MD , personally performed the services described in this documentation, as scribed by Lisette Pedraza in my presence, and it is both accurate and complete.    MEDICATIONS:  Current Outpatient Prescriptions   Medication Sig Dispense Refill     aspirin 81 MG EC tablet Take 81 mg by mouth daily.       atorvastatin (LIPITOR) 20 MG tablet TAKE 1 TABLET BY MOUTH EVERY NIGHT AT BEDTIME 90 tablet 3     citalopram (CELEXA) 20 MG tablet TAKE ONE TABLET BY MOUTH ONCE DAILY 90 tablet 3     fluocinonide (LIDEX) 0.05 % external solution        ketoconazole (NIZORAL) 2 % shampoo Use every 3 to 4 days for up to 8 weeks; then apply only as needed to control dandruff/itchiness/rash. 120 mL 3     No current facility-administered medications for this visit.        Total data points: 1

## 2021-06-13 NOTE — TELEPHONE ENCOUNTER
RN cannot approve Refill Request    RN can NOT refill this medication PCP messaged that patient is overdue for Labs and Protocol failed and NO refill given. Last office visit: 2/26/2020 Isabella Garsia MD Last Physical: 8/2/2017 Last MTM visit: Visit date not found Last visit same specialty: 2/26/2020 Isabella Garsia MD.  Next visit within 3 mo: Visit date not found  Next physical within 3 mo: Visit date not found      Angie Germain, [facility] Triage/Med Refill 11/12/2020    Requested Prescriptions   Pending Prescriptions Disp Refills     hydroCHLOROthiazide (HYDRODIURIL) 25 MG tablet [Pharmacy Med Name: hydroCHLOROthiazide Oral Tablet 25 MG] 90 tablet 0     Sig: TAKE ONE TABLET BY MOUTH ONE TIME DAILY       Diuretics/Combination Diuretics Refill Protocol  Failed - 11/9/2020  2:27 PM        Failed - Serum Potassium in past 12 months      No results found for: LN-POTASSIUM          Failed - Serum Sodium in past 12 months      No results found for: LN-SODIUM          Failed - Serum Creatinine in past 12 months      Creatinine   Date Value Ref Range Status   02/26/2019 0.87 0.60 - 1.10 mg/dL Final             Passed - Visit with PCP or prescribing provider visit in past 12 months     Last office visit with prescriber/PCP: 2/26/2020 Isabella Garsia MD OR same dept: 2/26/2020 Isabella Garsia MD OR same specialty: 2/26/2020 Isabella Garsia MD  Last physical: 8/2/2017 Last MTM visit: Visit date not found   Next visit within 3 mo: Visit date not found  Next physical within 3 mo: Visit date not found  Prescriber OR PCP: Isabella Iverson MD  Last diagnosis associated with med order: 1. Essential hypertension  - hydroCHLOROthiazide (HYDRODIURIL) 25 MG tablet [Pharmacy Med Name: hydroCHLOROthiazide Oral Tablet 25 MG]; TAKE ONE TABLET BY MOUTH ONE TIME DAILY   Dispense: 90 tablet; Refill: 0    If protocol passes may refill for 12 months if  within 3 months of last provider visit (or a total of 15 months).             Passed - Blood pressure on file in past 12 months     BP Readings from Last 1 Encounters:   02/26/20 138/86

## 2021-06-13 NOTE — TELEPHONE ENCOUNTER
RN cannot approve Refill Request    RN can NOT refill this medication PCP messaged that patient is overdue for Labs. Last office visit: 2/26/2020 Isabella Garsia MD Last Physical: 8/2/2017 Last MTM visit: Visit date not found Last visit same specialty: 2/26/2020 Isabella Garsia MD.  Next visit within 3 mo: Visit date not found  Next physical within 3 mo: Visit date not found      Zaira Khan, [facility] Triage/Med Refill 12/20/2020    Requested Prescriptions   Pending Prescriptions Disp Refills     losartan (COZAAR) 25 MG tablet [Pharmacy Med Name: Losartan Potassium Oral Tablet 25 MG] 90 tablet 0     Sig: TAKE ONE TABLET BY MOUTH ONE TIME DAILY       Angiotensin Receptor Blocker Protocol Failed - 12/20/2020  8:12 PM        Failed - Serum potassium within the past 12 months     No results found for: LN-POTASSIUM          Failed - Serum creatinine within the past 12 months     Creatinine   Date Value Ref Range Status   02/26/2019 0.87 0.60 - 1.10 mg/dL Final             Passed - PCP or prescribing provider visit in past 12 months       Last office visit with prescriber/PCP: 2/26/2020 Isabella Garsia MD OR same dept: 2/26/2020 Isabella Garsia MD OR same specialty: 2/26/2020 Isabella Garsia MD  Last physical: 8/2/2017 Last MTM visit: Visit date not found   Next visit within 3 mo: Visit date not found  Next physical within 3 mo: Visit date not found  Prescriber OR PCP: Isabella Iverson MD  Last diagnosis associated with med order: 1. Essential hypertension  - losartan (COZAAR) 25 MG tablet [Pharmacy Med Name: Losartan Potassium Oral Tablet 25 MG]; TAKE ONE TABLET BY MOUTH ONE TIME DAILY   Dispense: 90 tablet; Refill: 0    If protocol passes may refill for 12 months if within 3 months of last provider visit (or a total of 15 months).             Passed - Blood pressure filed in past 12 months     BP Readings from Last 1  Encounters:   02/26/20 138/86

## 2021-06-15 NOTE — TELEPHONE ENCOUNTER
RN cannot approve Refill Request    RN can NOT refill this medication Protocol failed and NO refill given. Last office visit: Visit date not found Last Physical: Visit date not found Last MTM visit: Visit date not found Last visit same specialty: 2/26/2020 Isabella Garsia MD.  Next visit within 3 mo: Visit date not found  Next physical within 3 mo: Visit date not found      Abram JACKSON Narendra, [facility] Triage/Med Refill 2/16/2021    Requested Prescriptions   Pending Prescriptions Disp Refills     hydroCHLOROthiazide (HYDRODIURIL) 25 MG tablet [Pharmacy Med Name: hydroCHLOROthiazide Oral Tablet 25 MG] 90 tablet 0     Sig: TAKE ONE TABLET BY MOUTH ONE TIME DAILY       Diuretics/Combination Diuretics Refill Protocol  Failed - 2/16/2021 10:18 AM        Failed - Serum Potassium in past 12 months      No results found for: LN-POTASSIUM          Failed - Serum Sodium in past 12 months      No results found for: LN-SODIUM          Failed - Serum Creatinine in past 12 months      Creatinine   Date Value Ref Range Status   02/26/2019 0.87 0.60 - 1.10 mg/dL Final             Passed - Visit with PCP or prescribing provider visit in past 12 months     Last office visit with prescriber/PCP: Visit date not found OR same dept: 2/26/2020 Isabella Garsia MD OR same specialty: 2/26/2020 Isabella Garsia MD  Last physical: Visit date not found Last MTM visit: Visit date not found   Next visit within 3 mo: Visit date not found  Next physical within 3 mo: Visit date not found  Prescriber OR PCP: Elliott Farmer MD  Last diagnosis associated with med order: 1. Essential hypertension  - hydroCHLOROthiazide (HYDRODIURIL) 25 MG tablet [Pharmacy Med Name: hydroCHLOROthiazide Oral Tablet 25 MG]; TAKE ONE TABLET BY MOUTH ONE TIME DAILY   Dispense: 90 tablet; Refill: 0    If protocol passes may refill for 12 months if within 3 months of last provider visit (or a total of 15 months).             Passed -  Blood pressure on file in past 12 months     BP Readings from Last 1 Encounters:   02/26/20 138/86

## 2021-06-16 NOTE — TELEPHONE ENCOUNTER
RN cannot approve Refill Request    RN can NOT refill this medication PCP messaged that patient is overdue for Office Visit. Last office visit: 2/26/2020 Isabella Garsia MD Last Physical: 8/2/2017 Last MTM visit: Visit date not found Last visit same specialty: 2/26/2020 Isabella Garsia MD.  Next visit within 3 mo: Visit date not found  Next physical within 3 mo: Visit date not found      Dana Hoff, [facility] Triage/Med Refill 3/31/2021    Requested Prescriptions   Pending Prescriptions Disp Refills     citalopram (CELEXA) 20 MG tablet [Pharmacy Med Name: Citalopram Hydrobromide Oral Tablet 20 MG] 90 tablet 0     Sig: TAKE ONE TABLET BY MOUTH ONE TIME DAILY       [facility] Refill Protocol  Failed - 3/30/2021  4:42 PM        Failed - PCP or prescribing provider visit in last year     Last office visit with prescriber/PCP: 2/26/2020 Isabella Garsia MD OR same dept: Visit date not found OR same specialty: 2/26/2020 Isabella Garsia MD  Last physical: 8/2/2017 Last MTM visit: Visit date not found   Next visit within 3 mo: Visit date not found  Next physical within 3 mo: Visit date not found  Prescriber OR PCP: Isabella Iverson MD  Last diagnosis associated with med order: 1. Mood disorder (H)  - citalopram (CELEXA) 20 MG tablet [Pharmacy Med Name: Citalopram Hydrobromide Oral Tablet 20 MG]; TAKE ONE TABLET BY MOUTH ONE TIME DAILY  Dispense: 90 tablet; Refill: 0    2. Anxiety  - citalopram (CELEXA) 20 MG tablet [Pharmacy Med Name: Citalopram Hydrobromide Oral Tablet 20 MG]; TAKE ONE TABLET BY MOUTH ONE TIME DAILY  Dispense: 90 tablet; Refill: 0    If protocol passes may refill for 12 months if within 3 months of last provider visit (or a total of 15 months).

## 2021-06-16 NOTE — TELEPHONE ENCOUNTER
RN cannot approve Refill Request    RN can NOT refill this medication PCP messaged that patient is overdue for Office Visit. Last office visit: 2/26/2020 Isabella Garsia MD Last Physical: 8/2/2017 Last MTM visit: Visit date not found Last visit same specialty: 2/26/2020 Isabella Garsia MD.  Next visit within 3 mo: Visit date not found  Next physical within 3 mo: Visit date not found      Dana Hoff, [facility] Triage/Med Refill 3/28/2021    Requested Prescriptions   Pending Prescriptions Disp Refills     atorvastatin (LIPITOR) 20 MG tablet [Pharmacy Med Name: Atorvastatin Calcium Oral Tablet 20 MG] 90 tablet 0     Sig: TAKE ONE TABLET BY MOUTH AT BEDTIME       Statins Refill Protocol (Hmg CoA Reductase Inhibitors) Failed - 3/28/2021  1:06 PM        Failed - PCP or prescribing provider visit in past 12 months      Last office visit with prescriber/PCP: 2/26/2020 Isabella Garsia MD OR same dept: Visit date not found OR same specialty: 2/26/2020 Isabella Garsia MD  Last physical: 8/2/2017 Last MTM visit: Visit date not found   Next visit within 3 mo: Visit date not found  Next physical within 3 mo: Visit date not found  Prescriber OR PCP: Isabella Iverson MD  Last diagnosis associated with med order: 1. Dyslipidemia  - atorvastatin (LIPITOR) 20 MG tablet [Pharmacy Med Name: Atorvastatin Calcium Oral Tablet 20 MG]; TAKE ONE TABLET BY MOUTH AT BEDTIME  Dispense: 90 tablet; Refill: 0    If protocol passes may refill for 12 months if within 3 months of last provider visit (or a total of 15 months).

## 2021-06-16 NOTE — TELEPHONE ENCOUNTER
RN cannot approve Refill Request    RN can NOT refill this medication med is not covered by policy/route to provider. Last office visit: 2/26/2020 Isabella Garsia MD Last Physical: 8/2/2017 Last MTM visit: Visit date not found Last visit same specialty: 2/26/2020 Isabella Garsia MD.  Next visit within 3 mo: Visit date not found  Next physical within 3 mo: Visit date not found      Mahi Lenz, [facility] Connection Triage/Med Refill 3/18/2021    Requested Prescriptions   Pending Prescriptions Disp Refills     losartan (COZAAR) 25 MG tablet [Pharmacy Med Name: Losartan Potassium Oral Tablet 25 MG] 90 tablet 0     Sig: TAKE ONE TABLET BY MOUTH ONE TIME DAILY       Angiotensin Receptor Blocker Protocol Failed - 3/17/2021  4:29 PM        Failed - PCP or prescribing provider visit in past 12 months       Last office visit with prescriber/PCP: 2/26/2020 Isabella Garsia MD OR same dept: Visit date not found OR same specialty: 2/26/2020 Isabella Garsia MD  Last physical: 8/2/2017 Last MTM visit: Visit date not found   Next visit within 3 mo: Visit date not found  Next physical within 3 mo: Visit date not found  Prescriber OR PCP: Isabella Iverson MD  Last diagnosis associated with med order: 1. Essential hypertension  - losartan (COZAAR) 25 MG tablet [Pharmacy Med Name: Losartan Potassium Oral Tablet 25 MG]; TAKE ONE TABLET BY MOUTH ONE TIME DAILY   Dispense: 90 tablet; Refill: 0    If protocol passes may refill for 12 months if within 3 months of last provider visit (or a total of 15 months).             Failed - Serum potassium within the past 12 months     No results found for: LN-POTASSIUM          Failed - Blood pressure filed in past 12 months     BP Readings from Last 1 Encounters:   02/26/20 138/86             Failed - Serum creatinine within the past 12 months     Creatinine   Date Value Ref Range Status   02/26/2019 0.87 0.60 - 1.10 mg/dL  Final

## 2021-06-17 NOTE — PATIENT INSTRUCTIONS - HE
Patient Instructions by Bela Mullins PT at 1/24/2020 12:30 PM     Author: Bela Mullins PT Service: -- Author Type: Physical Therapist    Filed: 1/24/2020  1:13 PM Encounter Date: 1/24/2020 Status: Signed    : Bela Mullins PT (Physical Therapist)            CERVICAL EXTENSION WITH TOWEL - CURVE OF NECK    Start with a small hand towel wrapped around the curve of your neck and holding the ends of the towel forward as shown. Next, extend your neck back over the towel as to look up at the ceiling. Then, return to starting position.     Your hands should remain still and holding the ends of the towel the entire time.       CERVICAL CHIN TUCK  AND RETRACTION - SUPINE WITH TOWEL    While lying on your back with a small folded up towel under your head, tuck your chin towards your chest. Also, focus on putting pressure on the towel with the back of your head.     Maintain contact of head with the towel the entire time.

## 2021-06-17 NOTE — PATIENT INSTRUCTIONS - HE
Patient Education   Personalized Prevention Plan  You are due for the preventive services outlined below.  Your care team is available to assist you in scheduling these services.  If you have already completed any of these items, please share that information with your care team to update in your medical record.  Health Maintenance Due   Topic Date Due     DEPRESSION ACTION PLAN  Never done     HEPATITIS C SCREENING  Never done     ADVANCE CARE PLANNING  Never done     TD 18+ HE  06/23/2018     ZOSTER VACCINES (3 of 3) 12/29/2020

## 2021-06-17 NOTE — PROGRESS NOTES
ASSESSMENT/PLAN:    Throat pain  72-year-old female presents with worsening throat pain of 3 weeks duration.  Her rapid strep is negative.  Her examination was unremarkable.  Due to the worsening nature of her symptoms, I would like her to see ENT.  She was agreeable to this plan  -     Rapid Strep A Screen-Throat  -     Group A Strep, RNA Direct Detection, Throat  -     Ambulatory referral to ENT    Elevated blood pressure reading  Elevated blood pressure without a history of hypertension.  Continue to monitor her blood pressure for now.  Come back in 1 month for blood pressure recheck.    Depression and anxiety  Stable on citalopram    Dyslipidemia  Stable on atorvastatin    Orders Placed This Encounter   Procedures     Rapid Strep A Screen-Throat     Group A Strep, RNA Direct Detection, Throat     Ambulatory referral to ENT     Referral Priority:   Routine     Referral Type:   Consultation     Referral Reason:   Evaluation and Treatment     Requested Specialty:   Otolaryngology     Number of Visits Requested:   1           CHIEF COMPLAINT:  Chief Complaint   Patient presents with     Sore Throat     on the right side, tongue feels numb sometimes x 3 weeks       HISTORY OF PRESENT ILLNESS:  Ariana is a 72 y.o. female presenting to the clinic today for throat pain. She has been having pain and soreness in the right sided throat and neck. This has been ongoing for the past 3 weeks. She does have some soreness and pain with swallowing. She can have pain at rest or with talking. She has felt like the back of the right sided tongue has felt numb. She feels this is getting worse since onset. No noticed sores on the tongue or in the throat. No dental pain. She has some mild post nasal drip, but she has been using Nasacort. Denies fevers or chills. No cough. No abdominal pain or headaches. Denies heartburn or reflux. No unintentional weight changes. She has some unchanged night sweats. Not a smoker; she did smoke for a  few years in her 20s.     Depression/Anxiety: She is currently taking citalopram 20mg daily. This is working well for her.     Additionally, her blood pressure is elevated at 152/78.     REVIEW OF SYSTEMS:   Constitutional: Negative.   Eyes: Negative.   Respiratory: Negative.   Cardiovascular: Negative.   Gastrointestinal: Negative.   Endocrine: Negative.   Genitourinary: Negative.   Musculoskeletal: Negative.   Skin: Negative.   Allergic/Immunologic: Negative.   Neurological: Negative.   Hematological: Negative.   Psychiatric/Behavioral: Negative.   All other systems are negative.    PFSH:  No new history.     TOBACCO USE:  History   Smoking Status     Never Smoker   Smokeless Tobacco     Never Used       VITALS:  Vitals:    04/09/18 1457 04/09/18 1539   BP: 152/78 148/80   Patient Site: Left Arm    Patient Position: Sitting    Cuff Size: Adult Regular    Pulse: 68    Weight: 156 lb 8 oz (71 kg)      Wt Readings from Last 3 Encounters:   04/09/18 156 lb 8 oz (71 kg)   08/02/17 154 lb 4 oz (70 kg)   10/10/16 156 lb 9.6 oz (71 kg)       PHYSICAL EXAM:  Constitutional: Patient is oriented to person, place, and time. Patient appears well-developed and well-nourished. No distress.   Head: Normocephalic and atraumatic.   Right Ear: External ear normal. Ear canal and TM normal.   Left Ear: External ear normal. Ear canal and TM normal.   Nose: Nose normal.   Mouth/Throat: Slight peritonsillar erythema. No oropharyngeal exudate.   Eyes: Conjunctivae and EOM are normal. Pupils are equal, round, and reactive to light. Right eye exhibits no discharge. Left eye exhibits no discharge. No scleral icterus.   Cardiovascular: Normal rate, regular rhythm, normal heart sounds and intact distal pulses. No murmur heard.   Pulmonary/Chest: Effort normal and breath sounds normal. No stridor. No respiratory distress. Patient has no wheezes, no rales, exhibits no tenderness.   Lymphadenopathy:  Patient has no cervical adenopathy.    .    Results for orders placed or performed in visit on 04/09/18   Rapid Strep A Screen-Throat   Result Value Ref Range    Rapid Strep A Antigen No Group A Strep detected, presumptive negative No Group A Strep detected, presumptive negative         ADDITIONAL HISTORY SUMMARIZED (2): None.  DECISION TO OBTAIN EXTRA INFORMATION (1): None.   RADIOLOGY TESTS (1): None.  LABS (1): Ordered labs today.  MEDICINE TESTS (1): None.  INDEPENDENT REVIEW (2 each): None.     I, Lisette Pedraza, am scribing for and in the presence of, Dr. Kim.    Isabella HARPER MD , personally performed the services described in this documentation, as scribed by Lisette Pedraza in my presence, and it is both accurate and complete.    MEDICATIONS:  Current Outpatient Prescriptions   Medication Sig Dispense Refill     aspirin 81 MG EC tablet Take 81 mg by mouth daily.       atorvastatin (LIPITOR) 20 MG tablet TAKE 1 TABLET BY MOUTH EVERY NIGHT AT BEDTIME 90 tablet 3     citalopram (CELEXA) 20 MG tablet TAKE ONE TABLET BY MOUTH ONCE DAILY 90 tablet 3     fluocinonide (LIDEX) 0.05 % external solution        ketoconazole (NIZORAL) 2 % shampoo Use every 3 to 4 days for up to 8 weeks; then apply only as needed to control dandruff/itchiness/rash. 120 mL 3     No current facility-administered medications for this visit.        Total data points: 1

## 2021-06-17 NOTE — PATIENT INSTRUCTIONS - HE
Patient Instructions by Bela Mullins PT at 12/19/2019  2:00 PM     Author: Bela Mullins PT Service: -- Author Type: Physical Therapist    Filed: 12/19/2019  2:37 PM Encounter Date: 12/19/2019 Status: Signed    : Bela Mullins PT (Physical Therapist)        SCAPULAR RETRACTIONS    Draw your shoulder blades back and down.  10x  2-3x/day                DOORWAY STRETCH -     While standing in a doorway, place your hands on doorframe at elbow height.    NOTE: Your legs should control how much you stretch by bending or straightening your knee through the doorway.    10-30 seconds 1x with each leg stepping forward  2x/day              ELASTIC BAND BILATERAL EXTERNAL ROTATION    Keep elbow at side and rotate left arm outward (keeping elbow bent to 90 degrees)    10x  1x/day  Yellow band

## 2021-06-17 NOTE — PROGRESS NOTES
Assessment and Plan:     Patient has been advised of split billing requirements and indicates understanding: Yes  Ariana was seen today for annual wellness visit.    Routine general medical examination at a health care facility  We discussed healthy lifestyle, nutrition, cardiovascular risk reduction, self care, safety, sunscreen, and timing of cancer screening.  Health maintenance screening and immunizations reviewed with the patient.  Follow up yearly for the annual physical.     Essential hypertension  -     Comprehensive Metabolic Panel; Future  Stable on hydrochlorothiazide 25 mg and losartan 25 mg  Follow-up yearly  Come back with CMP    Dyslipidemia  -     Lipid Cascade; Future  Stable on atorvastatin 20 mg  Follow-up yearly  Come back for fasting lipid    Major depression in remission (H)  Stable on citalopram 20 mg  Follow-up yearly    Skipped heart beats  -     Echo Complete; Future    Encounter for screening mammogram for breast cancer  -     Mammo Screening Bilateral; Future    Dermatitis  -     hydrocortisone 2.5 % cream; Apply 2.5 application topically 2 (two) times a day as needed.  refilled      The patient's current medical problems were reviewed.    I have had an Advance Directives discussion with the patient.  The following health maintenance schedule was reviewed with the patient and provided in printed form in the after visit summary:   Health Maintenance   Topic Date Due     DEPRESSION ACTION PLAN  Never done     HEPATITIS C SCREENING  Never done     ADVANCE CARE PLANNING  Never done     TD 18+ HE  06/23/2018     ZOSTER VACCINES (3 of 3) 12/29/2020     MEDICARE ANNUAL WELLNESS VISIT  05/12/2022     FALL RISK ASSESSMENT  05/12/2022     LIPID  02/26/2024     COLORECTAL CANCER SCREENING  08/01/2027     DEXA SCAN  01/18/2032     Pneumococcal Vaccine: 65+ Years  Completed     INFLUENZA VACCINE RULE BASED  Completed     COVID-19 Vaccine  Completed     Pneumococcal Vaccine: Pediatrics (0 to 5 Years)  and At-Risk Patients (6 to 64 Years)  Aged Out     HEPATITIS B VACCINES  Aged Out       Subjective:   Chief Complaint: Ariana Hodge is an 75 y.o. female here for an Annual Wellness visit.   HPI:    Skipped heart beat sensation on/off with associated pain of both armpits  No chest pain  No shortness of breath  Still on losartan & hydrochlorothiazide  Still on lipitor  FMH of strokes but no family h/o of CAD    Review of Systems: Please see above.  The rest of the review of systems are negative for all systems.    Patient Care Team:  Isabella Garsia MD as PCP - General  Isabella Garsia MD as Assigned PCP  Lawrence Butterfield DPM as Assigned Musculoskeletal Provider  Catracho Mares MD as Assigned Surgical Provider     Patient Active Problem List   Diagnosis     Psoriasis     Dyslipidemia     Insomnia     Benign Adenomatous Polyp Of The Large Intestine     Internal Hemorrhoids     Lower Back Pain     Major depression in remission (H)     Essential hypertension     Plantar fasciitis     Equinus contracture of ankle     Past Medical History:   Diagnosis Date     CHF (congestive heart failure) (H) 2/26/2020     Inverted nipple     both breasts ever since she had her breast reduction surgery     Skin cancer       Past Surgical History:   Procedure Laterality Date     [place] BREAST REDUCTION      Description: Breast Surgery Reduction Procedure;  Proc Date: 09/01/2006;     REDUCTION MAMMAPLASTY Bilateral In the 2000's      No family history on file.   Social History     Socioeconomic History     Marital status:      Spouse name: Not on file     Number of children: Not on file     Years of education: Not on file     Highest education level: Not on file   Occupational History     Not on file   Social Needs     Financial resource strain: Not on file     Food insecurity     Worry: Not on file     Inability: Not on file     Transportation needs     Medical: Not on file     Non-medical: Not on file    Tobacco Use     Smoking status: Never Smoker     Smokeless tobacco: Never Used   Substance and Sexual Activity     Alcohol use: Not on file     Drug use: Not on file     Sexual activity: Not on file   Lifestyle     Physical activity     Days per week: Not on file     Minutes per session: Not on file     Stress: Not on file   Relationships     Social connections     Talks on phone: Not on file     Gets together: Not on file     Attends Mu-ism service: Not on file     Active member of club or organization: Not on file     Attends meetings of clubs or organizations: Not on file     Relationship status: Not on file     Intimate partner violence     Fear of current or ex partner: Not on file     Emotionally abused: Not on file     Physically abused: Not on file     Forced sexual activity: Not on file   Other Topics Concern     Not on file   Social History Narrative     Not on file      Current Outpatient Medications   Medication Sig Dispense Refill     atorvastatin (LIPITOR) 20 MG tablet TAKE ONE TABLET BY MOUTH AT BEDTIME 30 tablet 0     citalopram (CELEXA) 20 MG tablet TAKE ONE TABLET BY MOUTH ONE TIME DAILY 30 tablet 0     fluocinonide (LIDEX) 0.05 % external solution        hydroCHLOROthiazide (HYDRODIURIL) 25 MG tablet TAKE ONE TABLET BY MOUTH ONE TIME DAILY  90 tablet 0     hydrocortisone 2.5 % cream Apply 2.5 application topically 2 (two) times a day as needed.       losartan (COZAAR) 25 MG tablet TAKE ONE TABLET BY MOUTH ONE TIME DAILY  90 tablet 0     triamcinolone (KENALOG) 0.1 % cream   6     albuterol (PROAIR HFA;PROVENTIL HFA;VENTOLIN HFA) 90 mcg/actuation inhaler Inhale 2 puffs.       benzonatate (TESSALON) 100 MG capsule Take 1 capsule by mouth 3 times daily if needed for Cough for up to 7 days.       codeine-guaiFENesin (GUAIFENESIN AC)  mg/5 mL liquid Take 10 mL by mouth 4 (four) times a day as needed. 240 mL 0     ketoconazole (NIZORAL) 2 % shampoo Use every 3 to 4 days for up to 8 weeks;  "then apply only as needed to control dandruff/itchiness/rash. 120 mL 3     No current facility-administered medications for this visit.       Objective:   Vital Signs:   Visit Vitals  /62 (Patient Site: Left Arm, Patient Position: Sitting, Cuff Size: Adult Large)   Pulse 76   Ht 4' 11.45\" (1.51 m)   Wt 156 lb 4.8 oz (70.9 kg)   Breastfeeding No   BMI 31.09 kg/m           VisionScreening:  No exam data present     PHYSICAL EXAM    Objective:    Physical Exam   Vitals:    05/12/21 1530   BP: 114/62   Pulse: 76      Constitutional: Patient is oriented to person, place, and time. Patient appears well-developed and well-nourished. No distress.   Head: Normocephalic and atraumatic.   Right Ear: External ear normal.   Left Ear: External ear normal.   Eyes: Conjunctivae and EOM are normal. Pupils are equal, round, and reactive to light. Right eye exhibits no discharge. Left eye exhibits no discharge. No scleral icterus.   Neck: Neck supple. No JVD present. No tracheal deviation present. No thyromegaly present.   Cardiovascular: Normal rate, regular rhythm, normal heart sounds and intact distal pulses.   Pulmonary/Chest: Effort normal and breath sounds normal. No stridor. No respiratory distress. Patient has no wheezes, no rales, exhibits no tenderness.   Abdominal: Soft. Patient exhibits no distension and no mass. There is no tenderness. There is no rebound and no guarding.   Lymphadenopathy:  Patient has no cervical adenopathy.   Neurological: Patient is alert and oriented to person, place, and time. No cranial nerve deficit. Coordination normal.   Skin: Skin is warm and dry. No rash noted. Patient is not diaphoretic. No erythema. No pallor.       No flowsheet data found.  A Mini-Cog score of 0-2 suggests the possibility of dementia, score of 3-5 suggests no dementia    Identified Health Risks:     "

## 2021-06-17 NOTE — PATIENT INSTRUCTIONS - HE
Patient Instructions by Lawrence Butterfield DPM at 11/25/2019  1:00 PM     Author: Lawrence Butterfield DPM Service: -- Author Type: Physician    Filed: 11/25/2019  1:22 PM Encounter Date: 11/25/2019 Status: Addendum    : Mahi Downs CMA (Certified Medical Assistant)    Related Notes: Original Note by Lawrence Butterfield DPM (Physician) filed at 11/25/2019  1:17 PM       Over the counter inserts: Superfeet       What is Plantar Fasciitis?  Plantar Fasciitis also referred to as heel pain syndrome is the most common cause cited for pain in heels. Plantar Fasciitis is the pain and inflammation at the point where the flat band of tissue called the plantar fascia connects the heel bone to the toes. Plantar fascia maintains the arch of the foot. Applying pressure on it will make it swollen, weak, and irritated. This will make the heel of your foot to ache when you walk or stand for a prolonged period.    Symptoms  Most people suffering from Plantar Fasciitis experience pain when they walk after resting their feet for a long duration. You may experience less pain and stiffness after a few steps. But your foot may hurt more as the day goes on. It may hurt the most when you climb stairs or after you stand for a long time. Continuous foot pain at night might be due to different problems like arthritis or nerve problems.    Causes  Straining the ligament which supports the arch can cause Plantar Fasciitis. Repeated straining can cause tiny tears in the ligament which lead to pain and swelling. Plantar Fasciitis is very evident in cases of:  Tight Achilles tendon or calf muscles   Running long distances, especially on hard & uneven surfaces   Problems of the foot arch   Sudden obesity   Wearing shoes with soft soles or poor arch support   In-toeing    Treatment Options  Anti-Inflammatory Medication   - Ibuprofen 600 mg by mouth 3 times per day with food     (discontinue if stomach irritation occurs)    - Topical pain  creams from [facility]     * apply 1-2 grams to painful areas 3 times per day prior to      Stretching    - Oral Prednisone  Orthotics (functional orthotics) Insurance Code:  (custome made, doctor prescribed)   Steroid injections    - Maximum of 3 injections in one year.   Night Splint   Physical therapy    - Stretching, Ultrasound, etc...

## 2021-06-17 NOTE — PATIENT INSTRUCTIONS - HE
Patient Instructions by Bela Mullins PT at 1/10/2020  2:00 PM     Author: Bela Mullins PT Service: -- Author Type: Physical Therapist    Filed: 1/10/2020  2:37 PM Encounter Date: 1/10/2020 Status: Signed    : Bela Mullins PT (Physical Therapist)        FLEXION SELF ASSISTED - SUPINE    While lying on your back with your arm at your side, grasp your affected arm and slowly raise it up upwards and towards overhead.  Your affected arm should be relaxed and your other arm performing the work.    CAN DO WITH THE CANE ALSO    10-30 second hold  1-2x  2x/day

## 2021-06-17 NOTE — TELEPHONE ENCOUNTER
RN cannot approve Refill Request    RN can NOT refill this medication PCP messaged that patient is overdue for Labs. Last office visit: 2/26/2020 Isabella Garsia MD Last Physical: 5/12/2021 Last MTM visit: Visit date not found Last visit same specialty: 2/26/2020 Isabella Garsia MD.  Next visit within 3 mo: Visit date not found  Next physical within 3 mo: Visit date not found      Dana Hoff, [facility] Triage/Med Refill 5/15/2021    Requested Prescriptions   Pending Prescriptions Disp Refills     hydroCHLOROthiazide (HYDRODIURIL) 25 MG tablet [Pharmacy Med Name: hydroCHLOROthiazide Oral Tablet 25 MG] 90 tablet 0     Sig: TAKE ONE TABLET BY MOUTH ONE TIME DAILY       Diuretics/Combination Diuretics Refill Protocol  Failed - 5/15/2021  2:57 PM        Failed - Serum Potassium in past 12 months      No results found for: LN-POTASSIUM          Failed - Serum Sodium in past 12 months      No results found for: LN-SODIUM          Failed - Serum Creatinine in past 12 months      Creatinine   Date Value Ref Range Status   02/26/2019 0.87 0.60 - 1.10 mg/dL Final             Passed - Visit with PCP or prescribing provider visit in past 12 months     Last office visit with prescriber/PCP: 2/26/2020 Isabella Garsia MD OR same dept: Visit date not found OR same specialty: 2/26/2020 Isabella Garsia MD  Last physical: 5/12/2021 Last MTM visit: Visit date not found   Next visit within 3 mo: Visit date not found  Next physical within 3 mo: Visit date not found  Prescriber OR PCP: Isabella Iverson MD  Last diagnosis associated with med order: 1. Essential hypertension  - hydroCHLOROthiazide (HYDRODIURIL) 25 MG tablet [Pharmacy Med Name: hydroCHLOROthiazide Oral Tablet 25 MG]; TAKE ONE TABLET BY MOUTH ONE TIME DAILY   Dispense: 90 tablet; Refill: 0    2. Mood disorder (H)  - citalopram (CELEXA) 20 MG tablet [Pharmacy Med Name: Citalopram Hydrobromide Oral  Tablet 20 MG]; TAKE ONE TABLET BY MOUTH ONE TIME DAILY  Dispense: 30 tablet; Refill: 0    3. Anxiety  - citalopram (CELEXA) 20 MG tablet [Pharmacy Med Name: Citalopram Hydrobromide Oral Tablet 20 MG]; TAKE ONE TABLET BY MOUTH ONE TIME DAILY  Dispense: 30 tablet; Refill: 0    4. Dyslipidemia  - atorvastatin (LIPITOR) 20 MG tablet [Pharmacy Med Name: Atorvastatin Calcium Oral Tablet 20 MG]; TAKE ONE TABLET BY MOUTH AT BEDTIME  Dispense: 30 tablet; Refill: 0    If protocol passes may refill for 12 months if within 3 months of last provider visit (or a total of 15 months).             Passed - Blood pressure on file in past 12 months     BP Readings from Last 1 Encounters:   05/12/21 114/62                citalopram (CELEXA) 20 MG tablet [Pharmacy Med Name: Citalopram Hydrobromide Oral Tablet 20 MG] 30 tablet 0     Sig: TAKE ONE TABLET BY MOUTH ONE TIME DAILY       [facility] Refill Protocol  Passed - 5/15/2021  2:57 PM        Passed - PCP or prescribing provider visit in last year     Last office visit with prescriber/PCP: 2/26/2020 Isabella Garsia MD OR same dept: Visit date not found OR same specialty: 2/26/2020 Isabella Garsia MD  Last physical: 5/12/2021 Last MTM visit: Visit date not found   Next visit within 3 mo: Visit date not found  Next physical within 3 mo: Visit date not found  Prescriber OR PCP: Isabella Iverson MD  Last diagnosis associated with med order: 1. Essential hypertension  - hydroCHLOROthiazide (HYDRODIURIL) 25 MG tablet [Pharmacy Med Name: hydroCHLOROthiazide Oral Tablet 25 MG]; TAKE ONE TABLET BY MOUTH ONE TIME DAILY   Dispense: 90 tablet; Refill: 0    2. Mood disorder (H)  - citalopram (CELEXA) 20 MG tablet [Pharmacy Med Name: Citalopram Hydrobromide Oral Tablet 20 MG]; TAKE ONE TABLET BY MOUTH ONE TIME DAILY  Dispense: 30 tablet; Refill: 0    3. Anxiety  - citalopram (CELEXA) 20 MG tablet [Pharmacy Med Name: Citalopram Hydrobromide Oral Tablet 20 MG]; TAKE  ONE TABLET BY MOUTH ONE TIME DAILY  Dispense: 30 tablet; Refill: 0    4. Dyslipidemia  - atorvastatin (LIPITOR) 20 MG tablet [Pharmacy Med Name: Atorvastatin Calcium Oral Tablet 20 MG]; TAKE ONE TABLET BY MOUTH AT BEDTIME  Dispense: 30 tablet; Refill: 0    If protocol passes may refill for 12 months if within 3 months of last provider visit (or a total of 15 months).                atorvastatin (LIPITOR) 20 MG tablet [Pharmacy Med Name: Atorvastatin Calcium Oral Tablet 20 MG] 30 tablet 0     Sig: TAKE ONE TABLET BY MOUTH AT BEDTIME       Statins Refill Protocol (Hmg CoA Reductase Inhibitors) Passed - 5/15/2021  2:57 PM        Passed - PCP or prescribing provider visit in past 12 months      Last office visit with prescriber/PCP: 2/26/2020 Isabella Garsia MD OR same dept: Visit date not found OR same specialty: 2/26/2020 Isabella Garsia MD  Last physical: 5/12/2021 Last MTM visit: Visit date not found   Next visit within 3 mo: Visit date not found  Next physical within 3 mo: Visit date not found  Prescriber OR PCP: Isabella Iverson MD  Last diagnosis associated with med order: 1. Essential hypertension  - hydroCHLOROthiazide (HYDRODIURIL) 25 MG tablet [Pharmacy Med Name: hydroCHLOROthiazide Oral Tablet 25 MG]; TAKE ONE TABLET BY MOUTH ONE TIME DAILY   Dispense: 90 tablet; Refill: 0    2. Mood disorder (H)  - citalopram (CELEXA) 20 MG tablet [Pharmacy Med Name: Citalopram Hydrobromide Oral Tablet 20 MG]; TAKE ONE TABLET BY MOUTH ONE TIME DAILY  Dispense: 30 tablet; Refill: 0    3. Anxiety  - citalopram (CELEXA) 20 MG tablet [Pharmacy Med Name: Citalopram Hydrobromide Oral Tablet 20 MG]; TAKE ONE TABLET BY MOUTH ONE TIME DAILY  Dispense: 30 tablet; Refill: 0    4. Dyslipidemia  - atorvastatin (LIPITOR) 20 MG tablet [Pharmacy Med Name: Atorvastatin Calcium Oral Tablet 20 MG]; TAKE ONE TABLET BY MOUTH AT BEDTIME  Dispense: 30 tablet; Refill: 0    If protocol passes may refill for  12 months if within 3 months of last provider visit (or a total of 15 months).

## 2021-06-18 NOTE — LETTER
Letter by Isabella Garsia MD at      Author: Isabelal Garsia MD Service: -- Author Type: --    Filed:  Encounter Date: 2/27/2019 Status: (Other)       Ariana Hodge  [address] [place] [address]             February 27, 2019         Dear Ms. Ayesha,    Below are the results from your recent visit:    Resulted Orders   Basic Metabolic Panel   Result Value Ref Range    Sodium 140 136 - 145 mmol/L    Potassium 3.5 3.5 - 5.0 mmol/L    Chloride 105 98 - 107 mmol/L    CO2 22 22 - 31 mmol/L    Anion Gap, Calculation 13 5 - 18 mmol/L    Glucose 109 70 - 125 mg/dL    Calcium 9.5 8.5 - 10.5 mg/dL    BUN 14 8 - 28 mg/dL    Creatinine 0.87 0.60 - 1.10 mg/dL    GFR MDRD Af Amer >60 >60 mL/min/1.73m2    GFR MDRD Non Af Amer >60 >60 mL/min/1.73m2    Narrative    Fasting Glucose reference range is 70-99 mg/dL per  American Diabetes Association (ADA) guidelines.   Lipid Cascade   Result Value Ref Range    Cholesterol 226 (H) <=199 mg/dL    Triglycerides 249 (H) <=149 mg/dL    HDL Cholesterol 50 >=50 mg/dL    LDL Calculated 126 <=129 mg/dL    Patient Fasting > 8hrs? Yes        Potassium is normal.  LDL is better.  Watch the dietary intake of glucose (sugar).      Please call with questions or contact us using [employer].    Sincerely,        Electronically signed by Isabella Iverson MD

## 2021-06-18 NOTE — PROGRESS NOTES
[facility] Daily Progress     Patient Name: Ariana Hodge  Date: 2018  Visit #: 2  Referral Diagnosis: dizziness  Referring provider: Landry, Dhaval Comer,*  Visit Diagnosis: No diagnosis found.      Assessment:     Patient is appropriate to continue with skilled occupational therapy intervention, as indicated by initial plan of care.    Goal Status:  Patient will bend: to dress;to clean;without vertigo;without loss of balance;in 12 weeks  Patient able to perform bed mobility: without vertigo;in 12 weeks  Patient will turn head: without dizziness;for watching traffic;for conversation;in 12 weeks  Patient will look up / down: without vertigo;for drinking;for reading;in 12 weeks    Plan / Patient Education:     Continue with initial plan of care. Progress with home program as tolerated.    Subjective:     Pain Ratin    Objective:       Subjective progress relative to last visit:  Intensity of dizziness is:  less  Frequency of dizziness is: less  Duration of dizziness is: less  Balance is:  better    Positional Tests:  Hallpike Right:  NT  Hallpike Left:  Abnormal - Nystagmus left torsional, up beating    Treatment Today: Treated with Epley maneuver X3 and half somersault. Patient instructed on Epley maneuver as well.  TREATMENT MINUTES COMMENTS   Evaluation     Self-care/ Home management     Neuromuscular Re-education     Canalith repositioning procedure 25          Total 25    Blank areas are intentional and mean the treatment did not include these items.          Sofie Ferrer  2018  10:09 AM

## 2021-06-18 NOTE — PROGRESS NOTES
HPI:  She notes no progression to her dizziness from a stapedetomy performed in 2018.  She gets worsened spinning with head down.    Past medical history, surgical history, social history, family history, medications, and allergies have been reviewed with the patient and are documented above.    Review of Systems: a 10-system review was performed. Pertinent positives are noted in the HPI and on a separate scanned document in the chart.    PHYSICAL EXAMINATION:  GEN: no acute distress, normocephalic  EYES: extraocular movements are intact, pupils are equal and round. Sclera clear.   EARS: auricles are normally formed. The external auditory canals are clear with minimal to no cerumen. Tympanic membranes are intact bilaterally with no signs of infection, effusion, retractions, or perforations.  NEURO: CN II-XII are intact bilaterally. alert and oriented. No nystagmus. Gait is normal.  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    MEDICAL DECISION-MAKING: Unfortunately no hearing in the operated ear.  We again reviewed the options of CROS, BAHA and the off label use of cochlear implantation.  She notes that her single sided deafness has affected her more than she imagined and it makes going to [employer] very difficult.  We will send her to [facility] for evaluation of options and potentially implant testing.  In regards to her BPPV, will set her up with Shanta Ferrer for further canalith repositioning.

## 2021-06-18 NOTE — LETTER
Letter by Isabella Garsia MD at      Author: Isabella Garsia MD Service: -- Author Type: --    Filed:  Encounter Date: 2/4/2019 Status: (Other)       Ariana Hodge  [address] [place] [address]             February 4, 2019         Dear Ms. Hodge,    Below are the results from your recent visit:    Resulted Orders   Lipid Cascade   Result Value Ref Range    Cholesterol 241 (H) <=199 mg/dL    Triglycerides 180 (H) <=149 mg/dL    HDL Cholesterol 61 >=50 mg/dL    LDL Calculated 144 (H) <=129 mg/dL    Patient Fasting > 8hrs? Yes    Comprehensive Metabolic Panel   Result Value Ref Range    Sodium 141 136 - 145 mmol/L    Potassium 4.0 3.5 - 5.0 mmol/L    Chloride 106 98 - 107 mmol/L    CO2 23 22 - 31 mmol/L    Anion Gap, Calculation 12 5 - 18 mmol/L    Glucose 99 70 - 125 mg/dL    BUN 15 8 - 28 mg/dL    Creatinine 0.78 0.60 - 1.10 mg/dL    GFR MDRD Af Amer >60 >60 mL/min/1.73m2    GFR MDRD Non Af Amer >60 >60 mL/min/1.73m2    Bilirubin, Total 0.7 0.0 - 1.0 mg/dL    Calcium 9.4 8.5 - 10.5 mg/dL    Protein, Total 6.7 6.0 - 8.0 g/dL    Albumin 4.1 3.5 - 5.0 g/dL    Alkaline Phosphatase 73 45 - 120 U/L    AST 20 0 - 40 U/L    ALT 24 0 - 45 U/L    Narrative    Fasting Glucose reference range is 70-99 mg/dL per  American Diabetes Association (ADA) guidelines.   Glycosylated Hemoglobin A1c   Result Value Ref Range    Hemoglobin A1c 5.0 3.5 - 6.1 %   HM1 (CBC with Diff)   Result Value Ref Range    WBC 5.3 4.0 - 11.0 thou/uL    RBC 4.13 3.80 - 5.40 mill/uL    Hemoglobin 13.4 12.0 - 16.0 g/dL    Hematocrit 39.6 35.0 - 47.0 %    MCV 96 80 - 100 fL    MCH 32.4 27.0 - 34.0 pg    MCHC 33.8 32.0 - 36.0 g/dL    RDW 11.9 11.0 - 14.5 %    Platelets 260 140 - 440 thou/uL    MPV 6.9 (L) 7.0 - 10.0 fL    Neutrophils % 57 50 - 70 %    Lymphocytes % 35 20 - 40 %    Monocytes % 6 2 - 10 %    Eosinophils % 2 0 - 6 %    Basophils % 1 0 - 2 %    Neutrophils Absolute 3.0 2.0 - 7.7 thou/uL    Lymphocytes Absolute  1.8 0.8 - 4.4 thou/uL    Monocytes Absolute 0.3 0.0 - 0.9 thou/uL    Eosinophils Absolute 0.1 0.0 - 0.4 thou/uL    Basophils Absolute 0.0 0.0 - 0.2 thou/uL       Cholesterol numbers are elevated.  White blood cells, red blood cells, hemoglobin, platelets, electrolytes, kidneys, liver functions are all stable.       Please call with questions or contact us using [employer].    Sincerely,        Electronically signed by Isabella Iverson MD

## 2021-06-18 NOTE — PROGRESS NOTES
Hearing Aid Evaluation:    Referring Physician: Dr. Alatorre     History: Ariana is scheduled to discuss different options available for her hearing today. 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 Ariana had a stapedectomy surgery in her left ear in 2016 which caused her to lose all hearing in that ear. She currently uses an Oticon CRISTIANO BTE hearing aid with earmold in her left ear. Ariana reports that the hearing aid helps with her vertigo, but she is unable to hear anything from that ear.     Device Discussion: The option of getting a cochlear implant for the left ear is discussed with Ariana. She shown some of the cochlear implant processors. Realistic expectations with cochlear implants are discussed. Ariana is told that her insurance likely would not cover a cochlear implant since she only has profound hearing loss in one ear and Medicare guidelines required that a candidate has moderate to profound hearing loss in both ears. Ariana is told that she would likely need to pay out-of-pocket for a cochlear implant.    Another option that is discussed today is an osseointegrated bone conduction device (BAHA). Ariana is told that this would be a surgery as well. She is told that the BAHA would  sounds from her left side and transmit them to her right side.     The last option discussed today is fitting Ariana's right ear with a hearing aid and using a CROS device in her left ear. This is another option that would  sounds from the left side and transmit them to the right side. This option would also amplify sounds on the right side. A demo Phonak Audeo  hearing aid is fit to her right ear and a demo Phonak CROS II-312 is fit to her left ear using  settings. Ariana enjoys listening with the devices during the appointment today.    Hearing Aid Evaluation: Ariana may be interested in pursuing a hearing  aid and CROS device. She would like to contact the clinic where she was fit with her old left hearing aid to see if this is something that they can fit her with. If she decides to order the devices through [facility] she would like to pursue a Phonak Audeo B hearing aid for the right ear and a Phonak CROS B for the left ear. She would like to order the devices in color P1 with a size 1  for the right ear and a size 1 CROS slim tube for the left ear. She would most likely prefer the premium technology with rechargeable batteries.    Plan:  Ariana would like to check with the clinic where she was fit with her last hearing aid before pursuing a hearing aid and CROS device through this clinic. She is provided a copy of [place] [employer] brochure and the pricing information. The patient should call this clinic with any questions or concerns. She will be contacted with information about her insurance benefits for hearing aids. The patient verbalized understanding and is in agreement with this plan.    Juanis Ho., CCC-A  [place] Licensed Audiologist #[id number]

## 2021-06-18 NOTE — PROGRESS NOTES
"Hearing evaluation in conjunction with ENT exam (Dr. Alatorre)    History:  Patient has history of stapedotomy in her left ear dating from August, 2016, per patient report. She currently wears monaural CRISTIANO amplification with custom c-shell in the left ear (surgical ear) only, despite having \"no hearing in that ear\", as she indicated it helps with her dizziness. She wanted a current test of her right/better ear, as she feels that the hearing may be declining on that side. She denied tinnitus, aural fullness, or otalgia; she indicated dizziness when she tips her head forward to look down.    Results:  Otoscopy was unremarkable in either ear. Hearing sensitivity was assessed with good reliability using insert phones.      Right ear:   Normal hearing sensitivity for 250-1500 Hz and at 4000 Hz, with mild, sloping to severe sensorineural hearing loss for 4791-3632 Hz and for 6021-7425 Hz. Air and bone thresholds were within test/retest limits, per 5-25-16 audiogram.    Left ear:  Profound mixed hearing loss for 250-8000 Hz; both air and bone conduction thresholds are markedly changed per 5-25-16 audiogram.  Hearing asymmetry was noted between ears; Tamanna was negative at multiple frequencies.      Speech reception thresholds showed agreement with pure tone findings in the right ear. Word recognition ability was excellent for the right ear, with presentation levels above typical conversational volume in both ears.  Speech testing was unable to be completed in the left ear, due to severity of hearing loss in that ear.    Tympanometry was consistent with normal middle ear function, bilaterally.    Recommendations:  Follow-up with ENT; retest hearing annually (to monitor) or per medical management.  Wear hearing protection consistently in noise to preserve residual hearing sensitivity in the right ear.  Ariana HAMLIN Aleksandrabeatrice is a potential  amplification candidate in the right ear, if patient motivation exists and medical clearance " is granted. CROS, BAHA, or even potential cochlear implantation may be considered.    Nighat Pedraza, [facility]  [place] Licensed Audiologist [id number]

## 2021-06-18 NOTE — PROGRESS NOTES
Assessment/Plan:        Diagnoses and all orders for this visit:    Sore throat  -     Rapid Strep A Screen- Throat Swab  -     Group A Strep, RNA Direct Detection, Throat        Rapid strep was done which came back negative.  Will send that for culture.  We also discussed differentials including viral causes and reflux.  We discussed about foods to avoid that would trigger reflux.  Consider Cepacol lozenges.  Fluid hydration, precautionary measures.  Recheck in a week with PCP if symptoms are persistent or worse.  She was agreeable with the plans.  Subjective:    Patient ID: Ariana Hodge is a 72 y.o. female.    HPI    Ariana is here with concerns about sore throat.  Symptoms started around 4 days ago.  She also points her sternal region into her epigastric area where she has a discomfort.  Does not feel as if it is stabbing her to her back.  She also has some slight cough at nighttime.  Has some rhinitis recently and tried Nasacort which helps.  Otherwise no increased postnasal drainage.  No fever, shortness of breath.  Was not exposed to anyone ill that she is aware of.  No recent travel.  Was feeling tired and just does not feel well yesterday.  She took ibuprofen a few weeks ago for lower back pain but not consistent.  No hemoptysis, vomiting, shortness of breath.  She does not take in much of spicy foods, citrus products, caffeine.  No alcohol.  Tends to eat late at night but sleeps late.  She had tomato sauce last night. last night.    Review of Systems  As above otherwise negative.          Objective:    Physical Exam  /70 (Patient Site: Left Arm, Patient Position: Sitting, Cuff Size: Adult Regular)  Pulse 67  Temp 97.9  F (36.6  C) (Oral)   Wt 153 lb 3.2 oz (69.5 kg)  SpO2 97%  Breastfeeding? No  BMI 29.92 kg/m2    Vital signs noted above. AAO ×3.  HEENT no nasal discharge, moist oral mucosa.  Hyperemic posterior pharyngeal wall, minimal swelling.  No exudates.  Ears:TMs intact, no fluid  collection. Neck: Supple neck, nonpalpable cervical lymph nodes.  Tenderness to palpation in her lateral neck.  No swelling, erythema.  Lungs: Clear to auscultation bilateral.  Heart: S1-S2 regular rate and rhythm, no murmurs were noted.  Abdomen:  with bowel sounds.

## 2021-06-18 NOTE — PROGRESS NOTES
HPI:  Ariana notes several weeks of irritation to the right side of her throat with intermittent numbness feeling in the back of her tongue.  She denies a change in her voice, although intermittent hoarseness.  She denies odynophagia or dysphagia.  She denies recent episodes of reflux.  No citrus intolerance.  No recent unexplained weight loss.    Past medical history, surgical history, social history, family history, medications, and allergies have been reviewed with the patient and are documented above.    Review of Systems: a 10-system review was performed. Pertinent positives are noted in the HPI and on a separate scanned document in the chart.    PHYSICAL EXAMINATION:  GEN: no acute distress, normocephalic  EYES: extraocular movements are intact, pupils are equal and round. Sclera clear.   NOSE: anterior nares are patent. There are no masses or lesions. The septum is non-obstructing.  OC/OP: clear, dentition is in good repair. The tongue and palate are fully mobile and symmetric. The floor of mouth, base of tongue, and tonsils are soft and symmetric.    PROCEDURE  Flexible nasopharyngoscopy  The nose is prepped with 4% lidocaine with 1% phenylephrine.  The flexible scope is introduced into the right nare.  Normal nasal anatomy, septum midline with no masses or polyps.  Normal naso/rafael/hypopharynx.  Vocal cords are normal and mobile.  There is interarytenoid edema consistent with LPR.  The aryepiglottic folds, epiglottis, vallecula and tongue base are normal.    NECK: soft and supple. No lymphadenopathy or masses. Airway is midline.  NEURO: CN II-XII are intact bilaterally. alert and oriented. No nystagmus. Gait is normal.  PULM: breathing comfortably on room air, normal chest expansion with respiration  HEART: regular rate and rhythm, no peripheral edema    MEDICAL DECISION-MAKING: Unclear etiology of throat pain, Certainly nothing conspicuous for head and neck neoplasm.  Discussed observation, CT neck with  contrast and trial of ant acids.  She wishes to observe for now and if not better in 8 weeks will consider CT neck with contrast.  I will see her next week for her hears and dizziness.  Will refill Diazepam in the interim.

## 2021-06-18 NOTE — PROGRESS NOTES
[facility] Certification Request    May 29, 2018      Patient: Ariana Hodge  MR Number: [id number]  YOB: 1945  Date of Visit: 5/29/2018      Dear Dr. Murcia Service:    Thank you for this referral.   We are seeing Ariana Hodge in Occupational Therapy for vertigo.    Medicare and/or Medicaid requires physician review and approval of the treatment plan. Please review the plan of care and verify that you agree with the therapy plan of care by co-signing this note.      Plan of Care  Authorization / Certification Start Date: 05/29/18  Authorization / Certification End Date: 08/27/18  Authorization / Certification Number of Visits: 12  Communication with: Referral Source  Patient Related Instruction: Nature of Condition;Treatment plan and rationale;Basis of treatment;Expected outcome  Times per Week: 1  Number of Weeks: 12  Number of Visits: 12  Neuromuscular Reeducation: vestibular  Canolith Repostioning:      Goals:  Patient will bend: to dress;to clean;without vertigo;without loss of balance;in 12 weeks  Patient able to perform bed mobility: without vertigo;in 12 weeks  Patient will turn head: without dizziness;for watching traffic;for conversation;in 12 weeks  Patient will look up / down: without vertigo;for drinking;for reading;in 12 weeks      If you have any questions or concerns, please don't hesitate to call.    Sincerely,      Sofie Ferrer, OT        Physician recommendation:     ___ Follow therapist's recommendation        ___ Modify therapy      *Physician co-signature indicates they certify the need for these services furnished within this plan and while under their care.      [facility]   Vestibular Initial Evaluation    Patient Name: Ariana Hodge  Date of evaluation: 5/29/2018  Referral Diagnosis: dizziness  Referring provider: Dhaval Alatorre,*  Visit Diagnosis:     ICD-10-CM    1. BPPV (benign paroxysmal positional vertigo), left H81.12     2. Vertigo R42    3. Unsteadiness on feet R26.81        Assessment:      Patient has positive left [place] - Hallpike. Treated with left Epley maneuver X2.    Goals:  Patient will bend: to dress;to clean;without vertigo;without loss of balance;in 12 weeks  Patient able to perform bed mobility: without vertigo;in 12 weeks  Patient will turn head: without dizziness;for watching traffic;for conversation;in 12 weeks  Patient will look up / down: without vertigo;for drinking;for reading;in 12 weeks    Patient's expectations/goals are realistic.    Barriers to Learning or Achieving Goals:  No Barriers.       Plan / Patient Instructions:        Plan of Care:   Authorization / Certification Start Date: 18  Authorization / Certification End Date: 18  Authorization / Certification Number of Visits: 12  Communication with: Referral Source  Patient Related Instruction: Nature of Condition;Treatment plan and rationale;Basis of treatment;Expected outcome  Times per Week: 1  Number of Weeks: 12  Number of Visits: 12  Neuromuscular Reeducation: vestibular  Canolith Repostioning:      Plan for next visit: repeat canalith maneuvers     Subjective:         History of Present Illness:    Ariana is a 72 y.o. female who presents to therapy today with complaints of vertigo and unsteadiness. Patient had sudden onset of symptoms in 2016 after she had a stapedectomy. Symptoms are not improving. She reports that the vertigo fluctuates. There are days when it's so severe that she has to stay in bed. She denies history of similar symptoms prior to the surgery. Patient denies ear pain. Patient reports hearing changes since she had a stapedectomy on the left ear in 2016.    Pain Ratin    Functional limitations are described as occurring with:   balance, bending, bed mobility, head turns, looking up or down, stepping over curbs         Objective:      Note: Items left blank indicates the item was not performed or not  indicated at the time of the evaluation.    Patient Outcome Measures:  Dizziness Handicap Inventory  Score in %: 40     Vestibular Disorder Examination  1. BPPV (benign paroxysmal positional vertigo), left     2. Vertigo     3. Unsteadiness on feet       Precautions/Restrictions: None  Posture Observation:      General standing posture is normal.    ROM:  Not Tested    Strength: Not Tested    Functional Mobility: fair      Oculomotor Assessment: Normal tracking and normal saccades    VOR Function: NT    Positional Tests:  Hallpike Right:  Negative  Hallpike Left:  Abnormal - Nystagmus left torsional, up beating    Balance Assessment: NT    Treatment Today: Treated with left Epley maneuver X2. No improvement. Plan to change head position next visit.  TREATMENT MINUTES COMMENTS   Evaluation 30    Self-care/ Home management     Neuromuscular Re-education     Canalith repositioning procedure 26          Total 56    Blank areas are intentional and mean the treatment did not include these items.     GOALS AND PLAN OF CARE WERE ESTABLISHED IN COOPERATION WITH THE PATIENT    OT Evaluation Code: (Please list factors)   Comorbidities: none  Profile/History Review: Brief   Need for eval modification: No   # Treatment options: Limited    Clinical Decision Making:  Low      Occupational Profile/ Medical and Therapy History and Comorbidities Occupational Performance Clinical Decision Making   (Complexity)   brief history with review of medical/therapy records related to the presenting problem.  No comorbidities 1-3 Performance deficits that result in activity limitations and/or participation restrictions.    No Assessment Modification  Low complexity, which includes  problem-focused assessments, and consideration of a limited number of treatment options.      expanded review of medical/therapy records and additional review of physical, cognitive and psychosocial history.    May have comorbidities 3-5 Performance deficits that  result in activity limitations and/or participation restrictions.    Minimal to moderate modification of assessment Moderate complexity, which includes analysis of data from detailed assessments, and consideration of several treatment options.         Review of medical/therapy records and extensive additional review of physical, cognitive and psychosocial history.  Comorbidities affect occupational performance 5 or more Performance deficits that result in activity limitations and/or participation restrictions.    Significant modification of assessment High complexity, analysis of  Occupational profile and data,  Comprehensive assessments, multiple treatment options.            Sofie Ferrer  5/29/2018  3:36-432PM

## 2021-06-19 NOTE — PROGRESS NOTES
[facility] Discharge Summary  Patient Name: Ariana Hodge  Date: 6/28/2018  Referral Diagnosis: Dizziness  Referring provider: Landry, Dhaval Comer,*  Visit Diagnosis:   1. BPPV (benign paroxysmal positional vertigo), left     2. Vertigo     3. Unsteadiness on feet         Goal status: goals met  Patient will bend: to dress;to clean;without vertigo;without loss of balance;in 12 weeks  Patient able to perform bed mobility: without vertigo;in 12 weeks  Patient will turn head: without dizziness;for watching traffic;for conversation;in 12 weeks  Patient will look up / down: without vertigo;for drinking;for reading;in 12 weeks    Patient was seen for 2 visits between 5-29-18 and 6-1-18.    Therapy will be discontinued at this time.  The patient will need a new referral to resume.    Thank you for your referral.  Sofie Ferrer  6/28/2018  1:06 PM

## 2021-06-22 ENCOUNTER — COMMUNICATION - HEALTHEAST (OUTPATIENT)
Dept: FAMILY MEDICINE | Facility: CLINIC | Age: 76
End: 2021-06-22

## 2021-06-22 DIAGNOSIS — I10 ESSENTIAL HYPERTENSION: ICD-10-CM

## 2021-06-23 NOTE — PROGRESS NOTES
"ASSESSMENT/PLAN:  Essential hypertension  New diagnosis  We spoke about complications of uncontrolled hypertension.  I will give her lisinopril 10 mg.  Continue with low-sodium diet and healthy lifestyle modifications.  She will follow-up with me in 1 month  -     Comprehensive Metabolic Panel  -     lisinopril (PRINIVIL,ZESTRIL) 10 MG tablet; Take 1 tablet (10 mg total) by mouth daily.    Dyslipidemia   Continue with atorvastatin 20 mg  -     Lipid Cascade    Elevated glucose  h/o elevated fasting glucose.  Will check A1c.  Also checking CMP  -     Glycosylated Hemoglobin A1c    Bloody nose, recurrent  Etiology is unclear at this time.  We will treat the hypertension.  Stop the aspirin.  Check CBC.  If she has recurrent nosebleed, may want to see ENT.  -     HM1(CBC and Differential)  -     HM1 (CBC with Diff)    SUBJECTIVE:    Ariana Hodge is a 73 y.o. female who came in today concerned about her rising BP and intermittent \"gushing\" epistaxis.   HTN: Patient has been dealing with fluctuating HTN for the past 2 years. She has never been on BP medication before. She has had intermittent, mild HA's recently and states she has never had regular HA's in life before. Denies CP, shortness of breath, or heart palpitations. Family hx of HTN.  Epistaxis: Patient reports the blood-flow is very heavy any time she has a nose bleed and that it happens spontaneously. She has been experiencing these intermittent episodes of epistaxis for the past month. She was hospitalized for this in the ED on 12/31/2018. Pt notes her nasal passages have been very dry.      Review of Systems (except those mentioned above)  Constitutional: Negative.   HENT: Negative.   Eyes: Negative.   Respiratory: Negative.   Cardiovascular: Negative.   Gastrointestinal: Negative.   Endocrine: Negative.   Genitourinary: Negative.   Musculoskeletal: Negative.   Skin: Negative.   Allergic/Immunologic: Negative.   Neurological: Negative.   Hematological: " Negative.   Psychiatric/Behavioral: Negative.     Patient Active Problem List    Diagnosis Date Noted     Major depression in remission (H) 10/10/2016     Lower Back Pain      Psoriasis      Dyslipidemia      Insomnia      Benign Adenomatous Polyp Of The Large Intestine      Internal Hemorrhoids      Allergies   Allergen Reactions     Cortisone Acetate      depressed on       Current Outpatient Medications   Medication Sig Dispense Refill     aspirin 81 MG EC tablet Take 81 mg by mouth daily.       atorvastatin (LIPITOR) 20 MG tablet TAKE ONE TABLET BY MOUTH AT BEDTIME  90 tablet 2     citalopram (CELEXA) 20 MG tablet TAKE ONE TABLET BY MOUTH ONE TIME DAILY  90 tablet 2     diazePAM (VALIUM) 5 MG tablet Take 1 tablet (5 mg total) by mouth every 8 (eight) hours as needed for other (vertigo). 20 tablet 0     fluocinonide (LIDEX) 0.05 % external solution        ketoconazole (NIZORAL) 2 % shampoo Use every 3 to 4 days for up to 8 weeks; then apply only as needed to control dandruff/itchiness/rash. 120 mL 3     lisinopril (PRINIVIL,ZESTRIL) 10 MG tablet Take 1 tablet (10 mg total) by mouth daily. 30 tablet 0     No current facility-administered medications for this visit.      Past Medical History:   Diagnosis Date     Skin cancer      Past Surgical History:   Procedure Laterality Date     DE REDUCTION OF LARGE BREAST      Description: Breast Surgery Reduction Procedure;  Proc Date: 09/01/2006;     REDUCTION MAMMAPLASTY Bilateral In the 2000's     Social History     Socioeconomic History     Marital status:      Spouse name: None     Number of children: None     Years of education: None     Highest education level: None   Social Needs     Financial resource strain: None     Food insecurity - worry: None     Food insecurity - inability: None     Transportation needs - medical: None     Transportation needs - non-medical: None   Occupational History     None   Tobacco Use     Smoking status: Never Smoker      Smokeless tobacco: Never Used   Substance and Sexual Activity     Alcohol use: None     Drug use: None     Sexual activity: None   Other Topics Concern     None   Social History Narrative     None     No family history on file.    OBJECTIVE:    Vitals:    01/31/19 1409 01/31/19 1445   BP: (!) 164/98 (!) 160/94   Patient Site: Left Arm Left Arm   Patient Position: Sitting Sitting   Cuff Size: Adult Regular Adult Regular   Pulse: 72    Weight: 153 lb 9 oz (69.7 kg)      Body mass index is 29.99 kg/m .    Physical Exam:  Constitutional: Patient was oriented to person, place, and time. Patient appeared well-developed and well-nourished. No distress.   Head: Normocephalic and atraumatic.   Right Ear: External ear normal. Normal TM  Left Ear: External ear normal. Normal TM  Nose: Nose normal.   Mouth/Throat: Oropharynx was clear and moist. No oropharyngeal exudate.   Eyes: Conjunctivae and EOM were normal. Pupils were equal, round, and reactive to light. Right eye exhibited no discharge. Left eye exhibited no discharge. No scleral icterus.   Neck: Neck supple. No JVD present. No tracheal deviation present. No thyromegaly present.   Lymphadenopathy:  Patient has no cervical adenopathy.   Cardiovascular: Normal rate, regular rhythm, normal heart sounds and intact distal pulses. No murmur heard.   Pulmonary/Chest: Effort normal and breath sounds normal. No stridor. No respiratory distress. Patient had no wheezes, no rales, exhibits no tenderness.   Abdominal: Soft. Bowel sounds were normal. Patient exhibited no distension and no mass. There was no tenderness. There was no rebound and no guarding.   Neurological: Patient was alert and oriented to person, place, and time. Patient had normal reflexes. No cranial nerve deficit. Coordination normal.   Skin: Skin was warm and dry. No rash noted. Patient was not diaphoretic. No erythema. No pallor.     Results for orders placed or performed in visit on 01/31/19   HM1 (CBC with  Diff)   Result Value Ref Range    WBC 5.3 4.0 - 11.0 thou/uL    RBC 4.13 3.80 - 5.40 mill/uL    Hemoglobin 13.4 12.0 - 16.0 g/dL    Hematocrit 39.6 35.0 - 47.0 %    MCV 96 80 - 100 fL    MCH 32.4 27.0 - 34.0 pg    MCHC 33.8 32.0 - 36.0 g/dL    RDW 11.9 11.0 - 14.5 %    Platelets 260 140 - 440 thou/uL    MPV 6.9 (L) 7.0 - 10.0 fL    Neutrophils % 57 50 - 70 %    Lymphocytes % 35 20 - 40 %    Monocytes % 6 2 - 10 %    Eosinophils % 2 0 - 6 %    Basophils % 1 0 - 2 %    Neutrophils Absolute 3.0 2.0 - 7.7 thou/uL    Lymphocytes Absolute 1.8 0.8 - 4.4 thou/uL    Monocytes Absolute 0.3 0.0 - 0.9 thou/uL    Eosinophils Absolute 0.1 0.0 - 0.4 thou/uL    Basophils Absolute 0.0 0.0 - 0.2 thou/uL     ADDITIONAL HISTORY SUMMARIZED (2): ED notes on December 31, 2018.   DECISION TO OBTAIN EXTRA INFORMATION (1): None.   RADIOLOGY TESTS (1): None.  LABS (1): Labs ordered today. Labs (Glucose, Cholesterol) reviewed from 8/2/2017.  MEDICINE TESTS (1): Reviewed EKG on August 2016.  INDEPENDENT REVIEW (2 each): None.     Total data points = 4  I spent a total time of 26 minutes additional to the preventive, greater than 50% counseling and coordinating care regarding blood pressure concerns and occasional epistaxis.    By signing my name below, I, Griselda Prieto, attest that this documentation has been prepared under the direction and in the presence of Dr. Laura Kim.  Electronic Signature: Connie Wilson. 01/31/2019 14:28.    I, Dr. Isabella Iverson MD , personally performed the services described in this documentation. All medical record entries made by the scribe were at my direction and in my presence. I have reviewed the chart and discharge instructions (if applicable) and agree that the record reflects my personal performance and is accurate and complete.

## 2021-06-24 NOTE — PROGRESS NOTES
"ASSESSMENT/PLAN:  Essential hypertension  Unable to tolerate lisinopril due to cough.  Will switch to HCTZ.  Check BP daily & submit values in 1 wk.  Counseled healthy lifestyle modifications   -     hydroCHLOROthiazide (MICROZIDE) 12.5 mg capsule; Take 1 capsule (12.5 mg total) by mouth daily.      SUBJECTIVE:    Ariana Hodge is a 73 y.o. female who came in today for a HTN management  visit. Patient forgot her Lisinopril yesterday but has otherwise been taking it daily (10 mg). She complains from a \"terrible\" cough and states she will not continue to take Lisinopril because of this. Her at-home BP has been averaging 165-175 (systolic) over 80 (diastolic) with 137 as a low and 181 as a high. Pt notes her BP tends to be highest after loading her bird feeders and stomping through the snow outside. She usually checks her BP in the morning after waking up and in the afternoon after feeding the birds and walking around the yard.   Patient had a [employer] screening done and would like to share her results today.     Review of Systems (except those mentioned above)  Constitutional: Negative.   HENT: Negative.   Eyes: Negative.   Respiratory: Negative.   Cardiovascular: Negative.   Gastrointestinal: Negative.   Endocrine: Negative.   Genitourinary: Negative.   Musculoskeletal: Negative.   Skin: Negative.   Allergic/Immunologic: Negative.   Neurological: Negative.   Hematological: Negative.   Psychiatric/Behavioral: Negative.     Patient Active Problem List    Diagnosis Date Noted     Major depression in remission (H) 10/10/2016     Lower Back Pain      Psoriasis      Dyslipidemia      Insomnia      Benign Adenomatous Polyp Of The Large Intestine      Internal Hemorrhoids      Allergies   Allergen Reactions     Amiloride      Cortisone      Cortisone Acetate      depressed on       Current Outpatient Medications   Medication Sig Dispense Refill     atorvastatin (LIPITOR) 20 MG tablet TAKE ONE TABLET BY MOUTH AT BEDTIME  " 90 tablet 2     citalopram (CELEXA) 20 MG tablet TAKE ONE TABLET BY MOUTH ONE TIME DAILY  90 tablet 2     diazePAM (VALIUM) 5 MG tablet Take 1 tablet (5 mg total) by mouth every 8 (eight) hours as needed for other (vertigo). 20 tablet 0     fluocinonide (LIDEX) 0.05 % external solution        ketoconazole (NIZORAL) 2 % shampoo Use every 3 to 4 days for up to 8 weeks; then apply only as needed to control dandruff/itchiness/rash. 120 mL 3     triamcinolone (KENALOG) 0.1 % cream   6     aspirin 81 MG EC tablet Take 81 mg by mouth daily.       hydroCHLOROthiazide (MICROZIDE) 12.5 mg capsule Take 1 capsule (12.5 mg total) by mouth daily. 30 capsule 0     No current facility-administered medications for this visit.      Past Medical History:   Diagnosis Date     Skin cancer      Past Surgical History:   Procedure Laterality Date     ME REDUCTION OF LARGE BREAST      Description: Breast Surgery Reduction Procedure;  Proc Date: 09/01/2006;     REDUCTION MAMMAPLASTY Bilateral In the 2000's     Social History     Socioeconomic History     Marital status:      Spouse name: Not on file     Number of children: Not on file     Years of education: Not on file     Highest education level: Not on file   Social Needs     Financial resource strain: Not on file     Food insecurity - worry: Not on file     Food insecurity - inability: Not on file     Transportation needs - medical: Not on file     Transportation needs - non-medical: Not on file   Occupational History     Not on file   Tobacco Use     Smoking status: Never Smoker     Smokeless tobacco: Never Used   Substance and Sexual Activity     Alcohol use: Not on file     Drug use: Not on file     Sexual activity: Not on file   Other Topics Concern     Not on file   Social History Narrative     Not on file     No family history on file.    OBJECTIVE:    Vitals:    02/18/19 1245 02/18/19 1309   BP: 172/82 152/72   Patient Site: Left Arm    Patient Position: Sitting    Cuff  Size: Adult Regular    Pulse: 72    Weight: 154 lb 9.6 oz (70.1 kg)      Body mass index is 30.19 kg/m .    Constitutional: Patient is oriented to person, place, and time. Patient appears well-developed and well-nourished. No distress.   Head: Normocephalic and atraumatic.   Right Ear: External ear normal.   Left Ear: External ear normal.   Nose: Nose normal.   Mouth/Throat: Oropharynx is clear and moist. No oropharyngeal exudate.   Eyes: Conjunctivae and EOM are normal. Right eye exhibits no discharge. Left eye exhibits no discharge. No scleral icterus.   Neurological: Patient is alert and oriented to person, place, and time. Patient has normal reflexes. No cranial nerve deficit. Coordination normal.   Skin: No rash noted. Patient is not diaphoretic. No erythema. No pallor.    Results for orders placed or performed in visit on 01/31/19   Lipid Cascade   Result Value Ref Range    Cholesterol 241 (H) <=199 mg/dL    Triglycerides 180 (H) <=149 mg/dL    HDL Cholesterol 61 >=50 mg/dL    LDL Calculated 144 (H) <=129 mg/dL    Patient Fasting > 8hrs? Yes    Comprehensive Metabolic Panel   Result Value Ref Range    Sodium 141 136 - 145 mmol/L    Potassium 4.0 3.5 - 5.0 mmol/L    Chloride 106 98 - 107 mmol/L    CO2 23 22 - 31 mmol/L    Anion Gap, Calculation 12 5 - 18 mmol/L    Glucose 99 70 - 125 mg/dL    BUN 15 8 - 28 mg/dL    Creatinine 0.78 0.60 - 1.10 mg/dL    GFR MDRD Af Amer >60 >60 mL/min/1.73m2    GFR MDRD Non Af Amer >60 >60 mL/min/1.73m2    Bilirubin, Total 0.7 0.0 - 1.0 mg/dL    Calcium 9.4 8.5 - 10.5 mg/dL    Protein, Total 6.7 6.0 - 8.0 g/dL    Albumin 4.1 3.5 - 5.0 g/dL    Alkaline Phosphatase 73 45 - 120 U/L    AST 20 0 - 40 U/L    ALT 24 0 - 45 U/L   Glycosylated Hemoglobin A1c   Result Value Ref Range    Hemoglobin A1c 5.0 3.5 - 6.1 %   HM1 (CBC with Diff)   Result Value Ref Range    WBC 5.3 4.0 - 11.0 thou/uL    RBC 4.13 3.80 - 5.40 mill/uL    Hemoglobin 13.4 12.0 - 16.0 g/dL    Hematocrit 39.6 35.0 - 47.0  %    MCV 96 80 - 100 fL    MCH 32.4 27.0 - 34.0 pg    MCHC 33.8 32.0 - 36.0 g/dL    RDW 11.9 11.0 - 14.5 %    Platelets 260 140 - 440 thou/uL    MPV 6.9 (L) 7.0 - 10.0 fL    Neutrophils % 57 50 - 70 %    Lymphocytes % 35 20 - 40 %    Monocytes % 6 2 - 10 %    Eosinophils % 2 0 - 6 %    Basophils % 1 0 - 2 %    Neutrophils Absolute 3.0 2.0 - 7.7 thou/uL    Lymphocytes Absolute 1.8 0.8 - 4.4 thou/uL    Monocytes Absolute 0.3 0.0 - 0.9 thou/uL    Eosinophils Absolute 0.1 0.0 - 0.4 thou/uL    Basophils Absolute 0.0 0.0 - 0.2 thou/uL     ADDITIONAL HISTORY SUMMARIZED (2): None.   DECISION TO OBTAIN EXTRA INFORMATION (1): None.   RADIOLOGY TESTS (1): None.  LABS (1): None.  MEDICINE TESTS (1): None.  INDEPENDENT REVIEW (2 each): None.     Total data points = 0    I spent a total time of 16 minutes additional to the preventive, greater than 50% counseling and coordinating care regarding BP concerns and occasional epistaxis.    By signing my name below, I, Griselda Prieto, attest that this documentation has been prepared under the direction and in the presence of Dr. Laura Kim.  Electronic Signature: Connie Wilson. 02/18/2019 12:52.    I, Dr. Isabella Iverson MD , personally performed the services described in this documentation. All medical record entries made by the scribe were at my direction and in my presence. I have reviewed the chart and discharge instructions (if applicable) and agree that the record reflects my personal performance and is accurate and complete.

## 2021-06-24 NOTE — PROGRESS NOTES
ASSESSMENT/PLAN:    Essential hypertension  Blood pressure still elevated.  Will increase hydrochlorothiazide to 25 mg.  Continue with sodium restriction and follow-up in 1 week.  We spoke about complications of uncontrolled hypertension.  Of note, the patient's cough triggered the switch from lisinopril to hydrochlorothiazide.  The cough still has not gotten better.  Lisinopril may be still a likely alternative inserting that cough may be be related to lisinopril.  Check basic metabolic panel  -     hydroCHLOROthiazide (HYDRODIURIL) 25 MG tablet; Take 1 tablet (25 mg total) by mouth daily.  -     Basic Metabolic Panel    Cough  Patient has persistent cough despite changing lisinopril to hydrochlorothiazide.  She has had a productive cough for several weeks now with suspicion of bronchitis.  I will give her azithromycin.  I will also give her guaifenesin with codeine.  I will have her follow-up with me in 1 week.  -     azithromycin (ZITHROMAX Z-ALAYNA) 250 MG tablet; Take 2 tablets (500 mg) on  Day 1,  followed by 1 tablet (250 mg) once daily on Days 2 through 5.  -     codeine-guaiFENesin (GUAIFENESIN AC)  mg/5 mL liquid; Take 10 mL by mouth 2 (two) times a day as needed for cough.    Dyslipidemia  Due to a pleasantly elevation in her lipid panel compared to the results provided by her work just very recently.  She is on atorvastatin.  I will check another lipid cascade today  -     Lipid Cascade    SUBJECTIVE:    Ariana Hodge is a 73 y.o. female who came in today complaining of cough present for the past week and a half. Her cough has not changed since her HTN medication was switched. Patient received the whooping cough vaccination in 2008. Denies fever, acid reflux, allergies, or wheezing. Patient has slight shortness of breath during/after coughing spells. She has some nasal/post-nasal drainage but it is not excessive. Pt has been taking Delsym with little to no improvement. She was unable to sleep last  night because of the cough. Pt smoked tobacco briefly in college but is otherwise not a smoker.     Patient is tolerating HCTZ (12.5 mg daily) well but her BP continues to be hypertensive. She has been checking her BP 3x daily at home; her systolic numbers have been in the 150s-190s with most in the 160s. Her diastolic numbers have been from  with most in the 90s.     The patient was seen recently with a lipid panel done.  The lipid panel results was elevated to her surprise.  Similarly at the same time, she had a lipid panel done at her work which showed lower results.  She is taking a atorvastatin.  She is eating better.  She is active.  She is fasting today.    Review of Systems (except those mentioned above)  Constitutional: Negative.   HENT: Negative.   Eyes: Negative.   Respiratory: Negative.   Cardiovascular: Negative.   Gastrointestinal: Negative.   Endocrine: Negative.   Genitourinary: Negative.   Musculoskeletal: Negative.   Skin: Negative.   Allergic/Immunologic: Negative.   Neurological: Negative.   Hematological: Negative.   Psychiatric/Behavioral: Negative.     Patient Active Problem List    Diagnosis Date Noted     Major depression in remission (H) 10/10/2016     Lower Back Pain      Psoriasis      Dyslipidemia      Insomnia      Benign Adenomatous Polyp Of The Large Intestine      Internal Hemorrhoids      Allergies   Allergen Reactions     Amiloride      Cortisone      Cortisone Acetate      depressed on       Current Outpatient Medications   Medication Sig Dispense Refill     aspirin 81 MG EC tablet Take 81 mg by mouth daily.       atorvastatin (LIPITOR) 20 MG tablet TAKE ONE TABLET BY MOUTH AT BEDTIME  90 tablet 2     azithromycin (ZITHROMAX Z-ALAYNA) 250 MG tablet Take 2 tablets (500 mg) on  Day 1,  followed by 1 tablet (250 mg) once daily on Days 2 through 5. 6 tablet 0     citalopram (CELEXA) 20 MG tablet TAKE ONE TABLET BY MOUTH ONE TIME DAILY  90 tablet 2     codeine-guaiFENesin  (GUAIFENESIN AC)  mg/5 mL liquid Take 10 mL by mouth 2 (two) times a day as needed for cough. 240 mL 0     diazePAM (VALIUM) 5 MG tablet Take 1 tablet (5 mg total) by mouth every 8 (eight) hours as needed for other (vertigo). 20 tablet 0     fluocinonide (LIDEX) 0.05 % external solution        hydroCHLOROthiazide (HYDRODIURIL) 25 MG tablet Take 1 tablet (25 mg total) by mouth daily. 30 tablet 0     ketoconazole (NIZORAL) 2 % shampoo Use every 3 to 4 days for up to 8 weeks; then apply only as needed to control dandruff/itchiness/rash. 120 mL 3     triamcinolone (KENALOG) 0.1 % cream   6     No current facility-administered medications for this visit.      Past Medical History:   Diagnosis Date     Skin cancer      Past Surgical History:   Procedure Laterality Date     [place] REDUCTION OF LARGE BREAST      Description: Breast Surgery Reduction Procedure;  Proc Date: 09/01/2006;     REDUCTION MAMMAPLASTY Bilateral In the 2000's     Social History     Socioeconomic History     Marital status:      Spouse name: None     Number of children: None     Years of education: None     Highest education level: None   Occupational History     None   Social Needs     Financial resource strain: None     Food insecurity:     Worry: None     Inability: None     Transportation needs:     Medical: None     Non-medical: None   Tobacco Use     Smoking status: Never Smoker     Smokeless tobacco: Never Used   Substance and Sexual Activity     Alcohol use: None     Drug use: None     Sexual activity: None   Lifestyle     Physical activity:     Days per week: None     Minutes per session: None     Stress: None   Relationships     Social connections:     Talks on phone: None     Gets together: None     Attends Alevism service: None     Active member of club or organization: None     Attends meetings of clubs or organizations: None     Relationship status: None     Intimate partner violence:     Fear of current or ex partner: None      Emotionally abused: None     Physically abused: None     Forced sexual activity: None   Other Topics Concern     None   Social History Narrative     None     No family history on file.    OBJECTIVE:    Vitals:    02/26/19 0923 02/26/19 0949   BP: (!) 180/94 160/78   Pulse: 64    SpO2: 97%    Weight: 154 lb (69.9 kg)      Body mass index is 30.08 kg/m .    Physical Exam:  Constitutional: Patient was oriented to person, place, and time. Patient appeared well-developed and well-nourished. No distress.   Head: Normocephalic and atraumatic.   Right Ear: External ear normal. Normal TM. Vascularization of R ear.   Left Ear: External ear normal. Normal TM. Vascularization of L ear.   Nose: Nose normal.   Mouth/Throat: Oropharynx was clear and moist. No oropharyngeal exudate. Peritonsillar erythema.   Eyes: Conjunctivae and EOM were normal. Pupils were equal, round, and reactive to light. Right eye exhibited no discharge. Left eye exhibited no discharge. No scleral icterus.   Neck: Neck supple. No JVD present. No tracheal deviation present. No thyromegaly present.   Lymphadenopathy:  Patient has no cervical adenopathy.   Cardiovascular: Normal rate, regular rhythm, normal heart sounds and intact distal pulses. No murmur heard.   Pulmonary/Chest: Effort normal and breath sounds normal. No stridor. No respiratory distress. Patient had no wheezes, no rales, exhibits no tenderness.   Abdominal: Soft. Bowel sounds were normal. Patient exhibited no distension and no mass. There was no tenderness. There was no rebound and no guarding.   Neurological: Patient was alert and oriented to person, place, and time. Patient had normal reflexes. No cranial nerve deficit. Coordination normal.   Skin: Skin was warm and dry. No rash noted. Patient was not diaphoretic. No erythema. No pallor.     Results for orders placed or performed in visit on 01/31/19   Lipid Cascade   Result Value Ref Range    Cholesterol 241 (H) <=199 mg/dL     Triglycerides 180 (H) <=149 mg/dL    HDL Cholesterol 61 >=50 mg/dL    LDL Calculated 144 (H) <=129 mg/dL    Patient Fasting > 8hrs? Yes    Comprehensive Metabolic Panel   Result Value Ref Range    Sodium 141 136 - 145 mmol/L    Potassium 4.0 3.5 - 5.0 mmol/L    Chloride 106 98 - 107 mmol/L    CO2 23 22 - 31 mmol/L    Anion Gap, Calculation 12 5 - 18 mmol/L    Glucose 99 70 - 125 mg/dL    BUN 15 8 - 28 mg/dL    Creatinine 0.78 0.60 - 1.10 mg/dL    GFR MDRD Af Amer >60 >60 mL/min/1.73m2    GFR MDRD Non Af Amer >60 >60 mL/min/1.73m2    Bilirubin, Total 0.7 0.0 - 1.0 mg/dL    Calcium 9.4 8.5 - 10.5 mg/dL    Protein, Total 6.7 6.0 - 8.0 g/dL    Albumin 4.1 3.5 - 5.0 g/dL    Alkaline Phosphatase 73 45 - 120 U/L    AST 20 0 - 40 U/L    ALT 24 0 - 45 U/L   Glycosylated Hemoglobin A1c   Result Value Ref Range    Hemoglobin A1c 5.0 3.5 - 6.1 %   HM1 (CBC with Diff)   Result Value Ref Range    WBC 5.3 4.0 - 11.0 thou/uL    RBC 4.13 3.80 - 5.40 mill/uL    Hemoglobin 13.4 12.0 - 16.0 g/dL    Hematocrit 39.6 35.0 - 47.0 %    MCV 96 80 - 100 fL    MCH 32.4 27.0 - 34.0 pg    MCHC 33.8 32.0 - 36.0 g/dL    RDW 11.9 11.0 - 14.5 %    Platelets 260 140 - 440 thou/uL    MPV 6.9 (L) 7.0 - 10.0 fL    Neutrophils % 57 50 - 70 %    Lymphocytes % 35 20 - 40 %    Monocytes % 6 2 - 10 %    Eosinophils % 2 0 - 6 %    Basophils % 1 0 - 2 %    Neutrophils Absolute 3.0 2.0 - 7.7 thou/uL    Lymphocytes Absolute 1.8 0.8 - 4.4 thou/uL    Monocytes Absolute 0.3 0.0 - 0.9 thou/uL    Eosinophils Absolute 0.1 0.0 - 0.4 thou/uL    Basophils Absolute 0.0 0.0 - 0.2 thou/uL     ADDITIONAL HISTORY SUMMARIZED (2): None.   DECISION TO OBTAIN EXTRA INFORMATION (1): None.   RADIOLOGY TESTS (1): None.  LABS (1): Labs ordered today. Labs reviewed from 1/31/2019 (potassium nml, unusually high cholesterol).   MEDICINE TESTS (1): None.  INDEPENDENT REVIEW (2 each): None.     Total data points = 1    I spent a total time of 25 minutes additional to the preventive,  greater than 50% counseling and coordinating care regarding cough and hypertension medication management.    By signing my name below, I, Griselda Prieto, attest that this documentation has been prepared under the direction and in the presence of Dr. Laura Kim.  Electronic Signature: Connie Wilson. 02/26/2019 9:34.    I, Dr. Isabella Iverson MD , personally performed the services described in this documentation. All medical record entries made by the scribe were at my direction and in my presence. I have reviewed the chart and discharge instructions (if applicable) and agree that the record reflects my personal performance and is accurate and complete.

## 2021-06-24 NOTE — TELEPHONE ENCOUNTER
"\"I have had a terrible cough for a couple of weeks\".    She states that last night she had terrible coughing spells that kept her up all night.     Nothing she takes OTC helps relieve her symptoms.    She is unsure if she has a fever. Her friend told her that she might have whooping cough. Now patient is very worried.     She would like to be seen today.    Warm transfer to scheduling to assist patient.     Added to schedule today and informed she should mask upon arrival to the clinic.       Reason for Disposition    Patient wants to be seen    Protocols used: COUGH-A-OH      Coleen Burgess RN [facility]     "

## 2021-06-24 NOTE — PROGRESS NOTES
ASSESSMENT/PLAN:    Essential hypertension  Blood pressure is better on hydrochlorothiazide 25 mg.  Continue with this dose.  Follow-up in 3 months.    Cough  She completed azithromycin.  She has stopped taking codeine cough syrup because it made her drowsy.  The cough is better.    Confusion, Decreased taste and smell  Confusion for the last couple weeks in the setting of hypertension and decreased taste and smell specifically taste over the last 2 months.  I would like to get a brain MRI to rule out neurovascular causes versus cerebral mass.  Further management will depend on results.  She may need to see ENT for the case and smell.  He may need further workup for the confusion and cognitive decline  -     MR Brain With Without Contrast; Future    SUBJECTIVE:    Ariana Hodge is a 73 y.o. female who came in today     The patient was seen last week with elevated blood pressure.  Hydrochlorothiazide was increased to 25 mg.  Blood pressures at home have been in the 130s-140s systolic and 80s just to 90 diastolic, values that are much better than when she came in a week ago.  She denies any chest pain or shortness of breath.  She however is complaining of confusion and some cognitive decline.  She thinks the symptoms have been ongoing for the last couple weeks.  She had a moment of memory lapse as well which was noticed by her friend last week.  She is also complaining of decreased taste associated with decreased smell for the last few months now.  Denies headache.  Denies numbness and weakness of her extremities.  Denies difficulty swallowing, dysphagia, odynophagia.  Denies any speech concerns.    She was seen last week for a cough.  She completed azithromycin.  She has stopped taking codeine cough syrup because it made her sleepy.  She thinks her cough is better.  She is still very fatigued however.    Review of Systems (except those mentioned above)  Constitutional: Negative.   HENT: Negative.   Eyes: Negative.    Respiratory: Negative.   Cardiovascular: Negative.   Gastrointestinal: Negative.   Endocrine: Negative.   Genitourinary: Negative.   Musculoskeletal: Negative.   Skin: Negative.   Allergic/Immunologic: Negative.   Neurological: Negative.   Hematological: Negative.   Psychiatric/Behavioral: Negative.     Patient Active Problem List    Diagnosis Date Noted     Essential hypertension 03/04/2019     Major depression in remission (H) 10/10/2016     Lower Back Pain      Psoriasis      Dyslipidemia      Insomnia      Benign Adenomatous Polyp Of The Large Intestine      Internal Hemorrhoids      Allergies   Allergen Reactions     Amiloride      Cortisone      Cortisone Acetate      depressed on       Current Outpatient Medications   Medication Sig Dispense Refill     atorvastatin (LIPITOR) 20 MG tablet TAKE ONE TABLET BY MOUTH AT BEDTIME  90 tablet 2     citalopram (CELEXA) 20 MG tablet TAKE ONE TABLET BY MOUTH ONE TIME DAILY  90 tablet 2     hydroCHLOROthiazide (HYDRODIURIL) 25 MG tablet Take 1 tablet (25 mg total) by mouth daily. 30 tablet 0     ketoconazole (NIZORAL) 2 % shampoo Use every 3 to 4 days for up to 8 weeks; then apply only as needed to control dandruff/itchiness/rash. 120 mL 3     triamcinolone (KENALOG) 0.1 % cream   6     aspirin 81 MG EC tablet Take 81 mg by mouth daily.       codeine-guaiFENesin (GUAIFENESIN AC)  mg/5 mL liquid Take 10 mL by mouth 2 (two) times a day as needed for cough. 240 mL 0     diazePAM (VALIUM) 5 MG tablet Take 1 tablet (5 mg total) by mouth every 8 (eight) hours as needed for other (vertigo). 20 tablet 0     fluocinonide (LIDEX) 0.05 % external solution        No current facility-administered medications for this visit.      Past Medical History:   Diagnosis Date     Skin cancer      Past Surgical History:   Procedure Laterality Date     [place] REDUCTION OF LARGE BREAST      Description: Breast Surgery Reduction Procedure;  Proc Date: 09/01/2006;     REDUCTION MAMMAPLASTY  Bilateral In the 2000's     Social History     Socioeconomic History     Marital status:      Spouse name: None     Number of children: None     Years of education: None     Highest education level: None   Occupational History     None   Social Needs     Financial resource strain: None     Food insecurity:     Worry: None     Inability: None     Transportation needs:     Medical: None     Non-medical: None   Tobacco Use     Smoking status: Never Smoker     Smokeless tobacco: Never Used   Substance and Sexual Activity     Alcohol use: None     Drug use: None     Sexual activity: None   Lifestyle     Physical activity:     Days per week: None     Minutes per session: None     Stress: None   Relationships     Social connections:     Talks on phone: None     Gets together: None     Attends Latter-day service: None     Active member of club or organization: None     Attends meetings of clubs or organizations: None     Relationship status: None     Intimate partner violence:     Fear of current or ex partner: None     Emotionally abused: None     Physically abused: None     Forced sexual activity: None   Other Topics Concern     None   Social History Narrative     None     No family history on file.      OBJECTIVE:    Vitals:    03/04/19 1525   BP: 134/56   Temp: 98  F (36.7  C)   TempSrc: Oral   Weight: 155 lb 4.8 oz (70.4 kg)     Body mass index is 30.33 kg/m .    Physical Exam:  Constitutional: Patient is oriented to person, place, and time. Patient appears well-developed and well-nourished. No distress.   Head: Normocephalic and atraumatic.   Right Ear: External ear normal.   Left Ear: External ear normal.   Nose: Nose normal.   Mouth/Throat: Oropharynx is clear and moist. No oropharyngeal exudate.   Eyes: Conjunctivae and EOM are normal. Right eye exhibits no discharge. Left eye exhibits no discharge. No scleral icterus.   Neurological: Patient is alert and oriented to person, place, and time. No cranial nerve  deficit. Coordination normal.   Skin: No rash noted. Patient is not diaphoretic. No erythema. No pallor.      Results for orders placed or performed in visit on 02/26/19   Basic Metabolic Panel   Result Value Ref Range    Sodium 140 136 - 145 mmol/L    Potassium 3.5 3.5 - 5.0 mmol/L    Chloride 105 98 - 107 mmol/L    CO2 22 22 - 31 mmol/L    Anion Gap, Calculation 13 5 - 18 mmol/L    Glucose 109 70 - 125 mg/dL    Calcium 9.5 8.5 - 10.5 mg/dL    BUN 14 8 - 28 mg/dL    Creatinine 0.87 0.60 - 1.10 mg/dL    GFR MDRD Af Amer >60 >60 mL/min/1.73m2    GFR MDRD Non Af Amer >60 >60 mL/min/1.73m2   Lipid Cascade   Result Value Ref Range    Cholesterol 226 (H) <=199 mg/dL    Triglycerides 249 (H) <=149 mg/dL    HDL Cholesterol 50 >=50 mg/dL    LDL Calculated 126 <=129 mg/dL    Patient Fasting > 8hrs? Yes

## 2021-06-26 NOTE — TELEPHONE ENCOUNTER
Refill Approved    Rx renewed per Medication Renewal Policy. Medication was last renewed on 3/18/2021.  Last office visit: [facility]2021 [facility] Refill 6/22/2021     Requested Prescriptions   Pending Prescriptions Disp Refills     losartan (COZAAR) 25 MG tablet [Pharmacy Med Name: Losartan Potassium Oral Tablet 25 MG] 90 tablet 0     Sig: TAKE ONE TABLET BY MOUTH ONE TIME DAILY       Angiotensin Receptor Blocker Protocol Passed - 6/21/2021  7:23 PM        Passed - PCP or prescribing provider visit in past 12 months       Last office visit with prescriber/PCP: 2/26/2020 Isabella Garsia MD OR same dept: Visit date not found OR same specialty: 2/26/2020 Isabella Garsia MD  Last physical: 5/12/2021 Last MTM visit: Visit date not found   Next visit within 3 mo: Visit date not found  Next physical within 3 mo: Visit date not found  Prescriber OR PCP: Isabella Iverson MD  Last diagnosis associated with med order: 1. Essential hypertension  - losartan (COZAAR) 25 MG tablet [Pharmacy Med Name: Losartan Potassium Oral Tablet 25 MG]; TAKE ONE TABLET BY MOUTH ONE TIME DAILY   Dispense: 90 tablet; Refill: 0    If protocol passes may refill for 12 months if within 3 months of last provider visit (or a total of 15 months).             Passed - Serum potassium within the past 12 months     Lab Results   Component Value Date    Potassium 3.9 05/17/2021             Passed - Blood pressure filed in past 12 months     BP Readings from Last 1 Encounters:   05/12/21 114/62             Passed - Serum creatinine within the past 12 months     Creatinine   Date Value Ref Range Status   05/17/2021 0.74 0.60 - 1.10 mg/dL Final

## 2021-07-16 ENCOUNTER — HOSPITAL ENCOUNTER (OUTPATIENT)
Dept: CARDIOLOGY | Facility: CLINIC | Age: 76
Discharge: HOME | End: 2021-07-16
Attending: FAMILY MEDICINE | Admitting: FAMILY MEDICINE
Payer: COMMERCIAL

## 2021-07-16 DIAGNOSIS — I45.9 SKIPPED BEATS: ICD-10-CM

## 2021-07-16 PROCEDURE — 93306 TTE W/DOPPLER COMPLETE: CPT

## 2021-07-16 PROCEDURE — 93306 TTE W/DOPPLER COMPLETE: CPT | Mod: 26 | Performed by: INTERNAL MEDICINE

## 2021-07-21 ENCOUNTER — RECORDS - HEALTHEAST (OUTPATIENT)
Dept: ADMINISTRATIVE | Facility: CLINIC | Age: 76
End: 2021-07-21

## 2021-07-26 ENCOUNTER — HOSPITAL ENCOUNTER (OUTPATIENT)
Dept: MAMMOGRAPHY | Facility: CLINIC | Age: 76
Discharge: HOME | End: 2021-07-26
Attending: FAMILY MEDICINE | Admitting: FAMILY MEDICINE
Payer: COMMERCIAL

## 2021-07-26 DIAGNOSIS — Z12.31 ENCOUNTER FOR SCREENING MAMMOGRAM FOR BREAST CANCER: ICD-10-CM

## 2021-07-26 PROCEDURE — 77067 SCR MAMMO BI INCL CAD: CPT

## 2021-08-10 ENCOUNTER — ANCILLARY PROCEDURE (OUTPATIENT)
Dept: MAMMOGRAPHY | Facility: CLINIC | Age: 76
End: 2021-08-10
Attending: FAMILY MEDICINE
Payer: COMMERCIAL

## 2021-08-10 ENCOUNTER — DOCUMENTATION ONLY (OUTPATIENT)
Dept: SURGERY | Facility: CLINIC | Age: 76
End: 2021-08-10

## 2021-08-10 DIAGNOSIS — R92.0 BREAST MICROCALCIFICATIONS: ICD-10-CM

## 2021-08-10 PROCEDURE — 77061 BREAST TOMOSYNTHESIS UNI: CPT | Mod: LT

## 2021-08-10 PROCEDURE — 76642 ULTRASOUND BREAST LIMITED: CPT | Mod: LT

## 2021-08-10 NOTE — PROGRESS NOTES
Per Breast Radiologist request, scheduled patient to see Dr. Kent  for a surgical consult on Monday, 8-23-21 at the [facility].  Patient given RN contact information for questions or concerns.

## 2021-08-16 ENCOUNTER — ANCILLARY PROCEDURE (OUTPATIENT)
Dept: MAMMOGRAPHY | Facility: CLINIC | Age: 76
End: 2021-08-16
Attending: FAMILY MEDICINE
Payer: COMMERCIAL

## 2021-08-16 DIAGNOSIS — R92.0 BREAST MICROCALCIFICATIONS: ICD-10-CM

## 2021-08-16 PROCEDURE — 88342 IMHCHEM/IMCYTCHM 1ST ANTB: CPT | Mod: TC | Performed by: FAMILY MEDICINE

## 2021-08-16 PROCEDURE — 88360 TUMOR IMMUNOHISTOCHEM/MANUAL: CPT | Mod: TC | Performed by: FAMILY MEDICINE

## 2021-08-16 PROCEDURE — 272N000431 US BREAST BIOPSY CORE NEEDLE LEFT

## 2021-08-16 PROCEDURE — 272N000715 MA STEREOTACTIC BREAST BIOPSY VACUUM LT

## 2021-08-16 PROCEDURE — 999N000065 MA POST PROCEDURE LEFT

## 2021-08-16 RX ORDER — LIDOCAINE HYDROCHLORIDE AND EPINEPHRINE 10; 10 MG/ML; UG/ML
10 INJECTION, SOLUTION INFILTRATION; PERINEURAL ONCE
Status: SHIPPED | OUTPATIENT
Start: 2021-08-16

## 2021-08-17 ENCOUNTER — TELEPHONE (OUTPATIENT)
Dept: SURGERY | Facility: CLINIC | Age: 76
End: 2021-08-17

## 2021-08-17 NOTE — TELEPHONE ENCOUNTER
Patient states she received a call and tried to return call but was connected with PCP's clinic.  TC did not see any results to relay to patient.      Bridgette Garcia

## 2021-08-18 ENCOUNTER — TELEPHONE (OUTPATIENT)
Dept: SURGERY | Facility: CLINIC | Age: 76
End: 2021-08-18

## 2021-08-18 PROCEDURE — 88305 TISSUE EXAM BY PATHOLOGIST: CPT | Mod: 26 | Performed by: PATHOLOGY

## 2021-08-18 PROCEDURE — 88360 TUMOR IMMUNOHISTOCHEM/MANUAL: CPT | Mod: 26 | Performed by: PATHOLOGY

## 2021-08-18 PROCEDURE — 88342 IMHCHEM/IMCYTCHM 1ST ANTB: CPT | Mod: 26 | Performed by: PATHOLOGY

## 2021-08-18 NOTE — TELEPHONE ENCOUNTER
I reached Ariana to review her positive breast biopsy results.  Reviewed with her the Pathology Report as well as type, grade Informed her prognostic markers are still pending but should be available at her surgical consult.  Reviewed next steps and the sequence of events with a new diagnosis of breast cancer.  Scheduled patient to see Dr. Kent for surgical consult on Monday, 8-23-21.  Notified ordering MD of results and notification of patient by inbasket message.  Patient given my contact information for any questions.  Support provided, invited calls.

## 2021-08-20 LAB — SPECIMEN STATUS: NORMAL

## 2021-08-20 NOTE — PROGRESS NOTES
History:  This is a 75 year old female who I'm asked to see by Dr. Kim for evaluation of a left breast cancer.  She presents by herself.  This was picked up by screening mammogram as a new asymmetry. She denies any recent changes to her breasts including palpable masses, nipple discharge, or breast pain. A needle biopsy was done which shows invasive ductal carcinoma.  It is estrogen receptor positive, progesterone receptor positive, and HER-2 pending FISH.    Past medical history:  HTN  CHF  Hyperlipidemia  Depression  GERD    Past surgical history:  Reduction mammoplasty    Medications:     atorvastatin (LIPITOR) 20 MG tablet, TAKE ONE TABLET BY MOUTH AT BEDTIME, Disp: , Rfl:      citalopram (CELEXA) 20 MG tablet, TAKE ONE TABLET BY MOUTH ONE TIME DAILY, Disp: , Rfl:      hydrochlorothiazide (HYDRODIURIL) 25 MG tablet, TAKE ONE TABLET BY MOUTH ONE TIME DAILY, Disp: , Rfl:      hydrocortisone 2.5 % cream, APPLY ONE APPLICATION TOPICALLY TWICE DAILY AS NEEDED, Disp: , Rfl:      losartan (COZAAR) 25 MG tablet, TAKE ONE TABLET BY MOUTH ONE TIME DAILY, Disp: , Rfl:      S-Adenosylmethionine (MARY-E PO), Take 400 mg by mouth daily, Disp: , Rfl:     Allergies:  Amiloride  Cortisone    Social History:  Consumes 2 to 4 glasses of wine per day. Denies tobacco or illicit drug use.    Family History:  She has no family history of breast cancer or any other type of cancer.    Review of systems:  General ROS: No complaints or constitutional symptoms  Skin: No complaints or symptoms   Hematologic/Lymphatic: No symptoms or complaints  Psychiatric: No symptoms or complaints  Endocrine: No excessive fatigue, no hypermetabolic symptoms reported  Respiratory ROS: No cough, shortness of breath, or wheezing  Cardiovascular ROS: No chest pain or dyspnea on exertion  Breast ROS: Nipple inversion since reduction  Gastrointestinal ROS: No abdominal pain, nausea, diarrhea, or constipation  Musculoskeletal ROS: No recent injuries  "reported  Neurological ROS: No focal neurologic defects reported.      Physical exam:  Resp 16   Ht 1.499 m (4' 11\")   Wt 68.5 kg (151 lb)   Breastfeeding No   BMI 30.50 kg/m    General: Alert, cooperative, appears stated age   Skin: Skin color, texture, turgor normal, no rashes or lesions   Lymphatic: No obvious adenopathy, no swelling   Eyes: No scleral icterus, pupils equal  HENT: No traumatic injury to the head or face, no gross abnormalities  Lungs: Normal respiratory effort, breath sounds equal bilaterally  Heart: Regular rate and rhythm  Breasts: Breast reduction scars along with bilateral inverted nipples. No palpable masses or lymphadenopathy bilaterally  Abdomen: Soft, non-distended and non-tender to palpation  Neurologic: Grossly intact    Imaging:  Pertinent images personally reviewed by myself and discussed with the patient.  Radiology reports:  EXAM: MA DIAGNOSTIC LEFT W/ VERONIQUE, US BREAST LEFT LIMITED 1-3 QUADRANTS  LOCATION: [facility]  DATE/TIME: 08/10/2021, 1:09 PM     INDICATION: Microcalcifications in the left breast in the 6:00 position and new mass in the 10:00 position in the posterior depth on screening mammogram.   COMPARISON: 07/26/2021, 01/17/2020, 12/19/2017, 05/19/2015, 05/05/2014.     MAMMOGRAPHIC FINDINGS: Left full-field digital diagnostic mammogram performed. There are scattered areas of fibroglandular density. Scattered benign-appearing round and chunky calcifications are again seen in the breast. New grouped microcalcifications are again identified in the 6:00 position in the mid depth. They vary slightly in size, shape, and density and are indeterminate. Breast tomosynthesis was used in interpretation. On the additional tomographic images, the mass in the posterior breast appears to be in the 9:00 position rather than the 10:00 position and has some irregular margins. It is new compared to the prior exams.     ULTRASOUND FINDINGS: Targeted left breast " ultrasound was performed by both the ultrasonographer and the radiologist. In the 9:00 position 7 cm from the nipple, there was a hypoechoic mass with microlobulated margins and a hyperechoic halo measuring 6 x 6 x 7 mm. The left axilla was examined as well with no evidence of lymphadenopathy.                                                                      IMPRESSION:  1.  Small mass in the left breast is new compared to the prior exams and highly suspicious for malignancy. Ultrasound-guided core biopsy and surgical consultation is recommended. The calcifications in the 6:00 position are indeterminate. Stereotactic core biopsy of these is recommended given the appearance of the mass. The findings and the recommendations were discussed with the patient at the time of exam. We are helping her with scheduling the procedures as well as with scheduling surgical consultation with the [facility] breast surgeons for one week following the biopsies.     ACR BI-RADS Category 5: Highly Suggestive of Malignancy.    Pathology:  A. BREAST, LEFT, 6 O'CLOCK, MID DEPTH, STEREOTACTIC CORE BIOPSIES:  1. BENIGN CALCIFIED FIBROADENOMA   2. PROLIFERATIVE FIBROCYSTIC CHANGES WITH DUCT ECTASIA, COLUMNAR CELL CHANGES AND USUAL DUCTAL HYPERPLASIA, WITHOUT ATYPIA; ASSOCIATED INTRALUMINAL MICROCALCIFICATIONS PRESENT   3. NO EVIDENCE OF IN-SITU OR INVASIVE MALIGNANCY      B. BREAST, LEFT, 9 O'CLOCK, 7 CM FROM NIPPLE, ULTRASOUND-GUIDED CORE BIOPSIES OF MASS:  1. INVASIVE DUCTAL CARCINOMA:  a. GRADE: SHIRA GRADE II (of III)  b. SCORE: 6 (OF 9)  c. MAXIMUM INVASIVE TUMOR LENGTH WITHIN CORE BIOPSY: 8 MM   2. DUCTAL CARCINOMA IN SITU (DCIS): NOT DEFINITIVELY IDENTIFIED   3. ADDITIONAL FINDINGS:  STROMAL FIBROSIS     B. ANCILLARY STUDIES, LEFT BREAST TUMOR, 9 O'CLOCK, CORE BIOPSIES:   4. MELISSA-3: STRONGLY POSITIVE. CONSISTENT WITH BREAST CARCINOMA   5. ESTROGEN RECEPTOR: POSITIVE (95%; 2-3+)  6. PROGESTERONE RECEPTOR:  POSITIVE (15%; 2+)  7. HER2/LUIS: INDETERMINATE (2+); PARAFFIN BLOCK WILL BE SUBMITTED FOR REFLEX FISH TESTING    IMPRESSION:  Left breast invasive ductal carcinoma    - T1, N0, grade II, ER+, MT+, HER2 pending FISH    PLAN:   Discussed the surgical options for treatment of breast cancer which generally are a lumpectomy (partial mastectomy) combined with radiation versus a mastectomy.  Explained that the survival benefit is the same for both.  The difference is in local recurrence risk.  The patient is a good candidate for a lumpectomy.  With it being so small, it would require preoperative wire localization.  Discussed SLN biopsy.  The procedure and rationale were explained.   Discussed that at this point we do not know yet whether or not she will need chemotherapy and we may not know until we get all of the results of surgery back.  Sometimes the need for chemotherapy is dependent upon an Oncotype score.  Since the tumor is estrogen receptor positive, she will be a candidate for endocrine therapy.    After our discussion, all questions were answered to satisfaction.  She is interested in pursuing breast preservation therapy.  Therefore, we will plan to schedule a left wire localized lumpectomy with sentinel lymph node biopsy.  This is typically an outpatient procedure under local MAC anesthetic.  The risks and benefits of surgery were explained.  Also talked about expected recovery time. She understands that she will need someone to stay with her after surgery. We will schedule her for a preoperative Covid test. She will also need a preoperative physical. She would then follow-up with myself about 1 week after surgery to review final pathology and next steps. Preoperative orders have been placed. My surgery scheduler will give her a call to pick a date for the procedure.    Kinza Kent DO  General Surgeon  [facility]  Breast [facility]  [address] [address]   Office: [phone]  Employed by [facility]

## 2021-08-23 ENCOUNTER — OFFICE VISIT (OUTPATIENT)
Dept: SURGERY | Facility: CLINIC | Age: 76
End: 2021-08-23
Attending: FAMILY MEDICINE
Payer: COMMERCIAL

## 2021-08-23 VITALS — RESPIRATION RATE: 16 BRPM | WEIGHT: 151 LBS | HEIGHT: 59 IN | BODY MASS INDEX: 30.44 KG/M2

## 2021-08-23 DIAGNOSIS — C50.912 INVASIVE DUCTAL CARCINOMA OF BREAST, LEFT (H): Primary | ICD-10-CM

## 2021-08-23 PROCEDURE — G0463 HOSPITAL OUTPT CLINIC VISIT: HCPCS

## 2021-08-23 PROCEDURE — 99204 OFFICE O/P NEW MOD 45 MIN: CPT | Performed by: SURGERY

## 2021-08-23 RX ORDER — HYDROCORTISONE 2.5 %
CREAM (GRAM) TOPICAL
COMMUNITY
Start: 2021-05-15

## 2021-08-23 ASSESSMENT — MIFFLIN-ST. JEOR: SCORE: 1085.56

## 2021-08-23 NOTE — LETTER
8/23/2021         RE: Ariana Hodge  [address] [place] [address]        Dear Colleague,    Thank you for referring your patient, Ariana Hodge, to the [facility] [place]. Please see a copy of my visit note below.    History:  This is a 75 year old female who I'm asked to see by Dr. Kim for evaluation of a left breast cancer.  She presents by herself.  This was picked up by screening mammogram as a new asymmetry. She denies any recent changes to her breasts including palpable masses, nipple discharge, or breast pain. A needle biopsy was done which shows invasive ductal carcinoma.  It is estrogen receptor positive, progesterone receptor positive, and HER-2 pending FISH.    Past medical history:  HTN  CHF  Hyperlipidemia  Depression  GERD    Past surgical history:  Reduction mammoplasty    Medications:     atorvastatin (LIPITOR) 20 MG tablet, TAKE ONE TABLET BY MOUTH AT BEDTIME, Disp: , Rfl:      citalopram (CELEXA) 20 MG tablet, TAKE ONE TABLET BY MOUTH ONE TIME DAILY, Disp: , Rfl:      hydrochlorothiazide (HYDRODIURIL) 25 MG tablet, TAKE ONE TABLET BY MOUTH ONE TIME DAILY, Disp: , Rfl:      hydrocortisone 2.5 % cream, APPLY ONE APPLICATION TOPICALLY TWICE DAILY AS NEEDED, Disp: , Rfl:      losartan (COZAAR) 25 MG tablet, TAKE ONE TABLET BY MOUTH ONE TIME DAILY, Disp: , Rfl:      S-Adenosylmethionine (MARY-E PO), Take 400 mg by mouth daily, Disp: , Rfl:     Allergies:  Amiloride  Cortisone    Social History:  Consumes 2 to 4 glasses of wine per day. Denies tobacco or illicit drug use.    Family History:  She has no family history of breast cancer or any other type of cancer.    Review of systems:  General ROS: No complaints or constitutional symptoms  Skin: No complaints or symptoms   Hematologic/Lymphatic: No symptoms or complaints  Psychiatric: No symptoms or complaints  Endocrine: No excessive fatigue, no hypermetabolic symptoms reported  Respiratory ROS: No cough, shortness of  "breath, or wheezing  Cardiovascular ROS: No chest pain or dyspnea on exertion  Breast ROS: Nipple inversion since reduction  Gastrointestinal ROS: No abdominal pain, nausea, diarrhea, or constipation  Musculoskeletal ROS: No recent injuries reported  Neurological ROS: No focal neurologic defects reported.      Physical exam:  Resp 16   Ht 1.499 m (4' 11\")   Wt 68.5 kg (151 lb)   Breastfeeding No   BMI 30.50 kg/m    General: Alert, cooperative, appears stated age   Skin: Skin color, texture, turgor normal, no rashes or lesions   Lymphatic: No obvious adenopathy, no swelling   Eyes: No scleral icterus, pupils equal  HENT: No traumatic injury to the head or face, no gross abnormalities  Lungs: Normal respiratory effort, breath sounds equal bilaterally  Heart: Regular rate and rhythm  Breasts: Breast reduction scars along with bilateral inverted nipples. No palpable masses or lymphadenopathy bilaterally  Abdomen: Soft, non-distended and non-tender to palpation  Neurologic: Grossly intact    Imaging:  Pertinent images personally reviewed by myself and discussed with the patient.  Radiology reports:  EXAM: MA DIAGNOSTIC LEFT W/ VERONIQUE, US BREAST LEFT LIMITED 1-3 QUADRANTS  LOCATION: [facility]  DATE/TIME: 08/10/2021, 1:09 PM     INDICATION: Microcalcifications in the left breast in the 6:00 position and new mass in the 10:00 position in the posterior depth on screening mammogram.   COMPARISON: 07/26/2021, 01/17/2020, 12/19/2017, 05/19/2015, 05/05/2014.     MAMMOGRAPHIC FINDINGS: Left full-field digital diagnostic mammogram performed. There are scattered areas of fibroglandular density. Scattered benign-appearing round and chunky calcifications are again seen in the breast. New grouped microcalcifications are again identified in the 6:00 position in the mid depth. They vary slightly in size, shape, and density and are indeterminate. Breast tomosynthesis was used in interpretation. On the " additional tomographic images, the mass in the posterior breast appears to be in the 9:00 position rather than the 10:00 position and has some irregular margins. It is new compared to the prior exams.     ULTRASOUND FINDINGS: Targeted left breast ultrasound was performed by both the ultrasonographer and the radiologist. In the 9:00 position 7 cm from the nipple, there was a hypoechoic mass with microlobulated margins and a hyperechoic halo measuring 6 x 6 x 7 mm. The left axilla was examined as well with no evidence of lymphadenopathy.                                                                      IMPRESSION:  1.  Small mass in the left breast is new compared to the prior exams and highly suspicious for malignancy. Ultrasound-guided core biopsy and surgical consultation is recommended. The calcifications in the 6:00 position are indeterminate. Stereotactic core biopsy of these is recommended given the appearance of the mass. The findings and the recommendations were discussed with the patient at the time of exam. We are helping her with scheduling the procedures as well as with scheduling surgical consultation with the [facility] breast surgeons for one week following the biopsies.     ACR BI-RADS Category 5: Highly Suggestive of Malignancy.    Pathology:  A. BREAST, LEFT, 6 O'CLOCK, MID DEPTH, STEREOTACTIC CORE BIOPSIES:  1. BENIGN CALCIFIED FIBROADENOMA   2. PROLIFERATIVE FIBROCYSTIC CHANGES WITH DUCT ECTASIA, COLUMNAR CELL CHANGES AND USUAL DUCTAL HYPERPLASIA, WITHOUT ATYPIA; ASSOCIATED INTRALUMINAL MICROCALCIFICATIONS PRESENT   3. NO EVIDENCE OF IN-SITU OR INVASIVE MALIGNANCY      B. BREAST, LEFT, 9 O'CLOCK, 7 CM FROM NIPPLE, ULTRASOUND-GUIDED CORE BIOPSIES OF MASS:  1. INVASIVE DUCTAL CARCINOMA:  a. GRADE: SHIRA GRADE II (of III)  b. SCORE: 6 (OF 9)  c. MAXIMUM INVASIVE TUMOR LENGTH WITHIN CORE BIOPSY: 8 MM   2. DUCTAL CARCINOMA IN SITU (DCIS): NOT DEFINITIVELY IDENTIFIED    3. ADDITIONAL FINDINGS:  STROMAL FIBROSIS     B. ANCILLARY STUDIES, LEFT BREAST TUMOR, 9 O'CLOCK, CORE BIOPSIES:   4. MELISSA-3: STRONGLY POSITIVE. CONSISTENT WITH BREAST CARCINOMA   5. ESTROGEN RECEPTOR: POSITIVE (95%; 2-3+)  6. PROGESTERONE RECEPTOR: POSITIVE (15%; 2+)  7. HER2/LUIS: INDETERMINATE (2+); PARAFFIN BLOCK WILL BE SUBMITTED FOR REFLEX FISH TESTING    IMPRESSION:  Left breast invasive ductal carcinoma    - T1, N0, grade II, ER+, AL+, HER2 pending FISH    PLAN:   Discussed the surgical options for treatment of breast cancer which generally are a lumpectomy (partial mastectomy) combined with radiation versus a mastectomy.  Explained that the survival benefit is the same for both.  The difference is in local recurrence risk.  The patient is a good candidate for a lumpectomy.  With it being so small, it would require preoperative wire localization.  Discussed SLN biopsy.  The procedure and rationale were explained.   Discussed that at this point we do not know yet whether or not she will need chemotherapy and we may not know until we get all of the results of surgery back.  Sometimes the need for chemotherapy is dependent upon an Oncotype score.  Since the tumor is estrogen receptor positive, she will be a candidate for endocrine therapy.    After our discussion, all questions were answered to satisfaction.  She is interested in pursuing breast preservation therapy.  Therefore, we will plan to schedule a left wire localized lumpectomy with sentinel lymph node biopsy.  This is typically an outpatient procedure under local MAC anesthetic.  The risks and benefits of surgery were explained.  Also talked about expected recovery time. She understands that she will need someone to stay with her after surgery. We will schedule her for a preoperative Covid test. She will also need a preoperative physical. She would then follow-up with myself about 1 week after surgery to review final pathology and next steps.  Preoperative orders have been placed. My surgery scheduler will give her a call to pick a date for the procedure.    Kinza Kent DO  General Surgeon  [address] [address]  Office: [phone]  Employed by - [employer]          Again, thank you for allowing me to participate in the care of your patient.        Sincerely,        Kinza Kent DO

## 2021-08-23 NOTE — NURSING NOTE
"Ariana presents to [facility] today for a surgical consult with Dr. Kent  regarding her newly diagnosed left breast cancer.  She is accompanied by herself for consult.  RN assessment and EMR update. Resp 16   Ht 1.499 m (4' 11\")   Wt 68.5 kg (151 lb)   Breastfeeding No   BMI 30.50 kg/m   Patient given a Breast Cancer Packet, contents reviewed.  She met with Dr. Kent  see dictation for details of visit. She will plan a wire loc lumpectomy with sentinel node biopsy.  She will need a pre op physical as well as a covid test.  Pre and post op teaching complete.  Sofie to call her for surgery scheduling.  Support provided, invited calls.  RN time 15 mins.  "

## 2021-08-24 ENCOUNTER — TELEPHONE (OUTPATIENT)
Dept: FAMILY MEDICINE | Facility: CLINIC | Age: 76
End: 2021-08-24

## 2021-08-24 DIAGNOSIS — Z11.59 ENCOUNTER FOR SCREENING FOR OTHER VIRAL DISEASES: ICD-10-CM

## 2021-08-24 PROBLEM — C50.912 INVASIVE DUCTAL CARCINOMA OF BREAST, LEFT (H): Status: ACTIVE | Noted: 2021-08-24

## 2021-08-24 NOTE — TELEPHONE ENCOUNTER
Reason for Call:  Same Day Appointment, Requested Provider:  N/A    PCP: Isabella Garsia    Reason for visit: Pt has pre op physical for 9/2/2021, op is 9/03/2021, does the pre-op need  To be sooner than this?  If so can you work pt in?      Duration of symptoms: N/A    Have you been treated for this in the past? N/A    Additional comments: Please call pt back at [phone]      Can we leave a detailed message on this number? YES    Phone number patient can be reached at: Home number on file [phone] (home)    Best Time: ANY    Call taken on 8/24/2021 at 1:51 PM by Roxanna Valencia

## 2021-08-30 LAB
PATH REPORT.ADDENDUM SPEC: ABNORMAL
PATH REPORT.ADDENDUM SPEC: ABNORMAL
PATH REPORT.COMMENTS IMP SPEC: ABNORMAL
PATH REPORT.COMMENTS IMP SPEC: YES
PATH REPORT.FINAL DX SPEC: ABNORMAL
PATH REPORT.GROSS SPEC: ABNORMAL
PATH REPORT.MICROSCOPIC SPEC OTHER STN: ABNORMAL
PATH REPORT.MICROSCOPIC SPEC OTHER STN: ABNORMAL
PATH REPORT.RELEVANT HX SPEC: ABNORMAL
PHOTO IMAGE: ABNORMAL

## 2021-08-31 ENCOUNTER — TELEPHONE (OUTPATIENT)
Dept: SURGERY | Facility: CLINIC | Age: 76
End: 2021-08-31

## 2021-08-31 NOTE — TELEPHONE ENCOUNTER
Called patient with the results of her Her 2 by FISH.  It was negative.  Told her to proceed as planned with her surgery.

## 2021-09-02 ENCOUNTER — OFFICE VISIT (OUTPATIENT)
Dept: FAMILY MEDICINE | Facility: CLINIC | Age: 76
End: 2021-09-02
Payer: COMMERCIAL

## 2021-09-02 VITALS
HEIGHT: 60 IN | SYSTOLIC BLOOD PRESSURE: 124 MMHG | HEART RATE: 72 BPM | OXYGEN SATURATION: 96 % | DIASTOLIC BLOOD PRESSURE: 72 MMHG | BODY MASS INDEX: 30.09 KG/M2 | WEIGHT: 153.25 LBS

## 2021-09-02 DIAGNOSIS — C50.912 INVASIVE DUCTAL CARCINOMA OF BREAST, LEFT (H): ICD-10-CM

## 2021-09-02 DIAGNOSIS — I10 ESSENTIAL HYPERTENSION: ICD-10-CM

## 2021-09-02 DIAGNOSIS — F32.5 MAJOR DEPRESSION IN REMISSION (H): ICD-10-CM

## 2021-09-02 DIAGNOSIS — Z01.818 PREOP GENERAL PHYSICAL EXAM: Primary | ICD-10-CM

## 2021-09-02 LAB
ANION GAP SERPL CALCULATED.3IONS-SCNC: 15 MMOL/L (ref 5–18)
ATRIAL RATE - MUSE: 62 BPM
BUN SERPL-MCNC: 12 MG/DL (ref 8–28)
CALCIUM SERPL-MCNC: 9.8 MG/DL (ref 8.5–10.5)
CHLORIDE BLD-SCNC: 97 MMOL/L (ref 98–107)
CO2 SERPL-SCNC: 28 MMOL/L (ref 22–31)
CREAT SERPL-MCNC: 0.87 MG/DL (ref 0.6–1.1)
DIASTOLIC BLOOD PRESSURE - MUSE: NORMAL MMHG
GFR SERPL CREATININE-BSD FRML MDRD: 65 ML/MIN/1.73M2
GLUCOSE BLD-MCNC: 86 MG/DL (ref 70–125)
HGB BLD-MCNC: 14.5 G/DL (ref 11.7–15.7)
INTERPRETATION ECG - MUSE: NORMAL
P AXIS - MUSE: 54 DEGREES
POTASSIUM BLD-SCNC: 3.4 MMOL/L (ref 3.5–5)
PR INTERVAL - MUSE: 184 MS
QRS DURATION - MUSE: 84 MS
QT - MUSE: 450 MS
QTC - MUSE: 456 MS
R AXIS - MUSE: 44 DEGREES
SODIUM SERPL-SCNC: 140 MMOL/L (ref 136–145)
SYSTOLIC BLOOD PRESSURE - MUSE: NORMAL MMHG
T AXIS - MUSE: 40 DEGREES
VENTRICULAR RATE- MUSE: 62 BPM

## 2021-09-02 PROCEDURE — 85018 HEMOGLOBIN: CPT | Performed by: FAMILY MEDICINE

## 2021-09-02 PROCEDURE — 93010 ELECTROCARDIOGRAM REPORT: CPT | Performed by: INTERNAL MEDICINE

## 2021-09-02 PROCEDURE — 93005 ELECTROCARDIOGRAM TRACING: CPT | Performed by: FAMILY MEDICINE

## 2021-09-02 PROCEDURE — 80048 BASIC METABOLIC PNL TOTAL CA: CPT | Performed by: FAMILY MEDICINE

## 2021-09-02 PROCEDURE — 36415 COLL VENOUS BLD VENIPUNCTURE: CPT | Performed by: FAMILY MEDICINE

## 2021-09-02 PROCEDURE — 99215 OFFICE O/P EST HI 40 MIN: CPT | Performed by: FAMILY MEDICINE

## 2021-09-02 ASSESSMENT — MIFFLIN-ST. JEOR: SCORE: 1103.7

## 2021-09-02 NOTE — PROGRESS NOTES
[facility]  [address] [address]  Phone: [phone]  Fax: [phone]  Primary Provider: Isabella Rodriguez  Pre-op Performing Provider: ISABELLA RODRIGUEZ      PREOPERATIVE EVALUATION:  Today's date: 9/2/2021    Ariana Hodge is a 75 year old female who presents for a preoperative evaluation.    Surgical Information:  Surgery/Procedure: LEFT WIRE LOCALIZED LUMPECTOMY WITH SENTINEL LYMPH NODE BIOPSY  Surgery Location: [facility]  Surgeon:  Kinza Kent DO  Surgery Date: 9/7/21  Time of Surgery: 11:15  Where patient plans to recover: At home with family  Fax number for surgical facility: Note does not need to be faxed, will be available electronically in Epic.    Type of Anesthesia Anticipated: to be determined    Assessment & Plan     The proposed surgical procedure is considered INTERMEDIATE risk.    1.  Preop general physical exam, Invasive ductal carcinoma of breast, left (H)  - EKG 12-lead, tracing only  - Basic metabolic panel  (Ca, Cl, CO2, Creat, Gluc, K, Na, BUN)  - Hemoglobin  - Basic metabolic panel  (Ca, Cl, CO2, Creat, Gluc, K, Na, BUN)  - Hemoglobin    Risks and Recommendations:  The patient has the following additional risks and recommendations for perioperative complications:   - No identified additional risk factors other than previously addressed    RECOMMENDATION:  APPROVAL GIVEN to proceed with proposed procedure, without further diagnostic evaluation.    I personally reviewed the EKG which showed normal sinus rhythm.  I reviewed the surgeon's note from August 23, 2021  I reviewed the mammogram    2.  Essential hypertension  Stable on hydrochlorothiazide 25 mg and losartan 25 mg  No change in medication in preparation for surgery     3.  Dyslipidemia  Continue on Lipitor 20 mg  No change in medication in preparation for surgery    4.  Depression, anxiety  Continue with Celexa 20 mg  No change in  medication in preparation for surgery     Subjective     HPI related to upcoming procedure:     Preop Questions 9/2/2021   1. Have you ever had a heart attack or stroke? No   2. Have you ever had surgery on your heart or blood vessels, such as a stent placement, a coronary artery bypass, or surgery on an artery in your head, neck, heart, or legs? No   3. Do you have chest pain with activity? No   4. Do you have a history of  heart failure? No   5. Do you currently have a cold, bronchitis or symptoms of other infection? No   6. Do you have a cough, shortness of breath, or wheezing? No   7. Do you or anyone in your family have previous history of blood clots? No   8. Do you or does anyone in your family have a serious bleeding problem such as prolonged bleeding following surgeries or cuts? No   9. Have you ever had problems with anemia or been told to take iron pills? No   10. Have you had any abnormal blood loss such as black, tarry or bloody stools, or abnormal vaginal bleeding? No   11. Have you ever had a blood transfusion? No   12. Are you willing to have a blood transfusion if it is medically needed before, during, or after your surgery? Yes   13. Have you or any of your relatives ever had problems with anesthesia? No   14. Do you have sleep apnea, excessive snoring or daytime drowsiness? No   15. Do you have any artifical heart valves or other implanted medical devices like a pacemaker, defibrillator, or continuous glucose monitor? No   16. Do you have artificial joints? No   17. Are you allergic to latex? No     Preoperative Review of :   reviewed - no record of controlled substances prescribed.      Status of Chronic Conditions:  See problem list for active medical problems.  Problems all longstanding and stable, except as noted/documented.  See ROS for pertinent symptoms related to these conditions.      Review of Systems  CONSTITUTIONAL: NEGATIVE for fever, chills, change in  weight  INTEGUMENTARY/SKIN: NEGATIVE for worrisome rashes, moles or lesions  EYES: NEGATIVE for vision changes or irritation  ENT/MOUTH: NEGATIVE for ear, mouth and throat problems  RESP: NEGATIVE for significant cough or SOB  CV: NEGATIVE for chest pain, palpitations or peripheral edema  GI: NEGATIVE for nausea, abdominal pain, heartburn, or change in bowel habits  : NEGATIVE for frequency, dysuria, or hematuria  MUSCULOSKELETAL: NEGATIVE for significant arthralgias or myalgia  NEURO: NEGATIVE for weakness, dizziness or paresthesias  ENDOCRINE: NEGATIVE for temperature intolerance, skin/hair changes  HEME: NEGATIVE for bleeding problems  PSYCHIATRIC: NEGATIVE for changes in mood or affect    Patient Active Problem List    Diagnosis Date Noted     Invasive ductal carcinoma of breast, left (H) 08/24/2021     Priority: Medium     Added automatically from request for surgery [id number]       Benign neoplasm of colon 08/30/2020     Priority: Medium     Created by [facility] Annotation: Sep 15 2009 12:55PM - Judy Don: 11/04       Essential hypertension 03/04/2019     Priority: Medium     Major depression in remission (H) 10/10/2016     Priority: Medium      Past Medical History:   Diagnosis Date     CHF (congestive heart failure) (H) 2/26/2020     Hypertension      Inverted nipple     both breasts ever since she had her breast reduction surgery     Reflux esophagitis      Skin cancer      Past Surgical History:   Procedure Laterality Date     CL AFF SURGICAL PATHOLOGY  2000     HC REDUCTION OF LARGE BREAST      Description: Breast Surgery Reduction Procedure;  Proc Date: 09/01/2006;     MAMMOPLASTY REDUCTION Bilateral In the 2000's     Current Outpatient Medications   Medication Sig Dispense Refill     atorvastatin (LIPITOR) 20 MG tablet TAKE ONE TABLET BY MOUTH AT BEDTIME       citalopram (CELEXA) 20 MG tablet TAKE ONE TABLET BY MOUTH ONE TIME DAILY       hydrochlorothiazide (HYDRODIURIL) 25 MG tablet  "TAKE ONE TABLET BY MOUTH ONE TIME DAILY       hydrocortisone 2.5 % cream APPLY ONE APPLICATION TOPICALLY TWICE DAILY AS NEEDED       losartan (COZAAR) 25 MG tablet TAKE ONE TABLET BY MOUTH ONE TIME DAILY       S-Adenosylmethionine (MARY-E PO) Take 400 mg by mouth daily         Allergies   Allergen Reactions     Amiloride      Other reaction(s): *Unknown     Cortisone         Social History     Tobacco Use     Smoking status: Never Smoker     Smokeless tobacco: Never Used   Substance Use Topics     Alcohol use: Not on file     Family History   Problem Relation Age of Onset     Hypertension Mother      Cerebrovascular Disease Mother      Cerebrovascular Disease Father      Cerebrovascular Disease Brother      Depression Brother      History   Drug Use Not on file         Objective     /72 (BP Location: Left arm, Patient Position: Sitting)   Pulse 72   Ht 1.511 m (4' 11.5\")   Wt 69.5 kg (153 lb 4 oz)   SpO2 96%   BMI 30.43 kg/m      Physical Exam    GENERAL APPEARANCE: healthy, alert and no distress     EYES: EOMI, PERRL     HENT: ear canals and TM's normal and nose and mouth without ulcers or lesions     NECK: no adenopathy, no asymmetry, masses, or scars and thyroid normal to palpation     RESP: lungs clear to auscultation - no rales, rhonchi or wheezes     CV: regular rates and rhythm, normal S1 S2, no S3 or S4 and no murmur, click or rub     ABDOMEN:  soft, nontender, no HSM or masses and bowel sounds normal     MS: extremities normal- no gross deformities noted, no evidence of inflammation in joints, FROM in all extremities.     SKIN: no suspicious lesions or rashes     NEURO: Normal strength and tone, sensory exam grossly normal, mentation intact and speech normal     PSYCH: mentation appears normal. and affect normal/bright     LYMPHATICS: No cervical adenopathy    Recent Labs   Lab Test 05/17/21  1152      POTASSIUM 3.9   CR 0.74        Diagnostics:  Labs pending at this time.  Results will be " reviewed when available.   Personally reviewed the EKG which showed normal sinus rhythm with a ventricular rate of 62    Revised Cardiac Risk Index (RCRI):  The patient has the following serious cardiovascular risks for perioperative complications:   - No serious cardiac risks = 0 points     RCRI Interpretation: 0 points: Class I (very low risk - 0.4% complication rate)           Signed Electronically by: Isabella Iverson MD  Copy of this evaluation report is provided to requesting physician.

## 2021-09-02 NOTE — H&P (VIEW-ONLY)
[facility]  [address] [address]  Phone: [phone]  Fax: [phone]  Primary Provider: Isabella Rodriguez  Pre-op Performing Provider: ISABELLA RODRIGUEZ      PREOPERATIVE EVALUATION:  Today's date: 9/2/2021    Ariana Hodge is a 75 year old female who presents for a preoperative evaluation.    Surgical Information:  Surgery/Procedure: LEFT WIRE LOCALIZED LUMPECTOMY WITH SENTINEL LYMPH NODE BIOPSY  Surgery Location: [facility]  Surgeon:  Kinza Kent DO  Surgery Date: 9/7/21  Time of Surgery: 11:15  Where patient plans to recover: At home with family  Fax number for surgical facility: Note does not need to be faxed, will be available electronically in Epic.    Type of Anesthesia Anticipated: to be determined    Assessment & Plan     The proposed surgical procedure is considered INTERMEDIATE risk.    1.  Preop general physical exam, Invasive ductal carcinoma of breast, left (H)  - EKG 12-lead, tracing only  - Basic metabolic panel  (Ca, Cl, CO2, Creat, Gluc, K, Na, BUN)  - Hemoglobin  - Basic metabolic panel  (Ca, Cl, CO2, Creat, Gluc, K, Na, BUN)  - Hemoglobin    Risks and Recommendations:  The patient has the following additional risks and recommendations for perioperative complications:   - No identified additional risk factors other than previously addressed    RECOMMENDATION:  APPROVAL GIVEN to proceed with proposed procedure, without further diagnostic evaluation.    I personally reviewed the EKG which showed normal sinus rhythm.  I reviewed the surgeon's note from August 23, 2021  I reviewed the mammogram    2.  Essential hypertension  Stable on hydrochlorothiazide 25 mg and losartan 25 mg  No change in medication in preparation for surgery     3.  Dyslipidemia  Continue on Lipitor 20 mg  No change in medication in preparation for surgery    4.  Depression, anxiety  Continue with Celexa 20 mg  No change in  medication in preparation for surgery     Subjective     HPI related to upcoming procedure:     Preop Questions 9/2/2021   1. Have you ever had a heart attack or stroke? No   2. Have you ever had surgery on your heart or blood vessels, such as a stent placement, a coronary artery bypass, or surgery on an artery in your head, neck, heart, or legs? No   3. Do you have chest pain with activity? No   4. Do you have a history of  heart failure? No   5. Do you currently have a cold, bronchitis or symptoms of other infection? No   6. Do you have a cough, shortness of breath, or wheezing? No   7. Do you or anyone in your family have previous history of blood clots? No   8. Do you or does anyone in your family have a serious bleeding problem such as prolonged bleeding following surgeries or cuts? No   9. Have you ever had problems with anemia or been told to take iron pills? No   10. Have you had any abnormal blood loss such as black, tarry or bloody stools, or abnormal vaginal bleeding? No   11. Have you ever had a blood transfusion? No   12. Are you willing to have a blood transfusion if it is medically needed before, during, or after your surgery? Yes   13. Have you or any of your relatives ever had problems with anesthesia? No   14. Do you have sleep apnea, excessive snoring or daytime drowsiness? No   15. Do you have any artifical heart valves or other implanted medical devices like a pacemaker, defibrillator, or continuous glucose monitor? No   16. Do you have artificial joints? No   17. Are you allergic to latex? No     Preoperative Review of :   reviewed - no record of controlled substances prescribed.      Status of Chronic Conditions:  See problem list for active medical problems.  Problems all longstanding and stable, except as noted/documented.  See ROS for pertinent symptoms related to these conditions.      Review of Systems  CONSTITUTIONAL: NEGATIVE for fever, chills, change in  weight  INTEGUMENTARY/SKIN: NEGATIVE for worrisome rashes, moles or lesions  EYES: NEGATIVE for vision changes or irritation  ENT/MOUTH: NEGATIVE for ear, mouth and throat problems  RESP: NEGATIVE for significant cough or SOB  CV: NEGATIVE for chest pain, palpitations or peripheral edema  GI: NEGATIVE for nausea, abdominal pain, heartburn, or change in bowel habits  : NEGATIVE for frequency, dysuria, or hematuria  MUSCULOSKELETAL: NEGATIVE for significant arthralgias or myalgia  NEURO: NEGATIVE for weakness, dizziness or paresthesias  ENDOCRINE: NEGATIVE for temperature intolerance, skin/hair changes  HEME: NEGATIVE for bleeding problems  PSYCHIATRIC: NEGATIVE for changes in mood or affect    Patient Active Problem List    Diagnosis Date Noted     Invasive ductal carcinoma of breast, left (H) 08/24/2021     Priority: Medium     Added automatically from request for surgery [id number]       Benign neoplasm of colon 08/30/2020     Priority: Medium     Created by [facility] Annotation: Sep 15 2009 12:55PM - Judy Don: 11/04       Essential hypertension 03/04/2019     Priority: Medium     Major depression in remission (H) 10/10/2016     Priority: Medium      Past Medical History:   Diagnosis Date     CHF (congestive heart failure) (H) 2/26/2020     Hypertension      Inverted nipple     both breasts ever since she had her breast reduction surgery     Reflux esophagitis      Skin cancer      Past Surgical History:   Procedure Laterality Date     CL AFF SURGICAL PATHOLOGY  2000     HC REDUCTION OF LARGE BREAST      Description: Breast Surgery Reduction Procedure;  Proc Date: 09/01/2006;     MAMMOPLASTY REDUCTION Bilateral In the 2000's     Current Outpatient Medications   Medication Sig Dispense Refill     atorvastatin (LIPITOR) 20 MG tablet TAKE ONE TABLET BY MOUTH AT BEDTIME       citalopram (CELEXA) 20 MG tablet TAKE ONE TABLET BY MOUTH ONE TIME DAILY       hydrochlorothiazide (HYDRODIURIL) 25 MG tablet  "TAKE ONE TABLET BY MOUTH ONE TIME DAILY       hydrocortisone 2.5 % cream APPLY ONE APPLICATION TOPICALLY TWICE DAILY AS NEEDED       losartan (COZAAR) 25 MG tablet TAKE ONE TABLET BY MOUTH ONE TIME DAILY       S-Adenosylmethionine (MARY-E PO) Take 400 mg by mouth daily         Allergies   Allergen Reactions     Amiloride      Other reaction(s): *Unknown     Cortisone         Social History     Tobacco Use     Smoking status: Never Smoker     Smokeless tobacco: Never Used   Substance Use Topics     Alcohol use: Not on file     Family History   Problem Relation Age of Onset     Hypertension Mother      Cerebrovascular Disease Mother      Cerebrovascular Disease Father      Cerebrovascular Disease Brother      Depression Brother      History   Drug Use Not on file         Objective     /72 (BP Location: Left arm, Patient Position: Sitting)   Pulse 72   Ht 1.511 m (4' 11.5\")   Wt 69.5 kg (153 lb 4 oz)   SpO2 96%   BMI 30.43 kg/m      Physical Exam    GENERAL APPEARANCE: healthy, alert and no distress     EYES: EOMI, PERRL     HENT: ear canals and TM's normal and nose and mouth without ulcers or lesions     NECK: no adenopathy, no asymmetry, masses, or scars and thyroid normal to palpation     RESP: lungs clear to auscultation - no rales, rhonchi or wheezes     CV: regular rates and rhythm, normal S1 S2, no S3 or S4 and no murmur, click or rub     ABDOMEN:  soft, nontender, no HSM or masses and bowel sounds normal     MS: extremities normal- no gross deformities noted, no evidence of inflammation in joints, FROM in all extremities.     SKIN: no suspicious lesions or rashes     NEURO: Normal strength and tone, sensory exam grossly normal, mentation intact and speech normal     PSYCH: mentation appears normal. and affect normal/bright     LYMPHATICS: No cervical adenopathy    Recent Labs   Lab Test 05/17/21  1152      POTASSIUM 3.9   CR 0.74        Diagnostics:  Labs pending at this time.  Results will be " reviewed when available.   Personally reviewed the EKG which showed normal sinus rhythm with a ventricular rate of 62    Revised Cardiac Risk Index (RCRI):  The patient has the following serious cardiovascular risks for perioperative complications:   - No serious cardiac risks = 0 points     RCRI Interpretation: 0 points: Class I (very low risk - 0.4% complication rate)           Signed Electronically by: Isabella Iverson MD  Copy of this evaluation report is provided to requesting physician.

## 2021-09-03 ENCOUNTER — ANESTHESIA EVENT (OUTPATIENT)
Dept: SURGERY | Facility: AMBULATORY SURGERY CENTER | Age: 76
End: 2021-09-03
Payer: COMMERCIAL

## 2021-09-03 ASSESSMENT — MIFFLIN-ST. JEOR: SCORE: 1110.5

## 2021-09-05 ENCOUNTER — LAB (OUTPATIENT)
Dept: FAMILY MEDICINE | Facility: CLINIC | Age: 76
End: 2021-09-05
Attending: SURGERY
Payer: COMMERCIAL

## 2021-09-05 DIAGNOSIS — Z11.59 ENCOUNTER FOR SCREENING FOR OTHER VIRAL DISEASES: ICD-10-CM

## 2021-09-05 PROCEDURE — U0005 INFEC AGEN DETEC AMPLI PROBE: HCPCS

## 2021-09-05 PROCEDURE — U0003 INFECTIOUS AGENT DETECTION BY NUCLEIC ACID (DNA OR RNA); SEVERE ACUTE RESPIRATORY SYNDROME CORONAVIRUS 2 (SARS-COV-2) (CORONAVIRUS DISEASE [COVID-19]), AMPLIFIED PROBE TECHNIQUE, MAKING USE OF HIGH THROUGHPUT TECHNOLOGIES AS DESCRIBED BY CMS-2020-01-R: HCPCS

## 2021-09-06 LAB — SARS-COV-2 RNA RESP QL NAA+PROBE: NEGATIVE

## 2021-09-07 ENCOUNTER — ANCILLARY PROCEDURE (OUTPATIENT)
Dept: MAMMOGRAPHY | Facility: CLINIC | Age: 76
End: 2021-09-07
Attending: SURGERY
Payer: COMMERCIAL

## 2021-09-07 ENCOUNTER — HOSPITAL ENCOUNTER (OUTPATIENT)
Dept: NUCLEAR MEDICINE | Facility: HOSPITAL | Age: 76
End: 2021-09-07
Attending: SURGERY
Payer: COMMERCIAL

## 2021-09-07 ENCOUNTER — ANESTHESIA (OUTPATIENT)
Dept: SURGERY | Facility: AMBULATORY SURGERY CENTER | Age: 76
End: 2021-09-07
Payer: COMMERCIAL

## 2021-09-07 ENCOUNTER — HOSPITAL ENCOUNTER (OUTPATIENT)
Facility: AMBULATORY SURGERY CENTER | Age: 76
End: 2021-09-07
Attending: SURGERY
Payer: COMMERCIAL

## 2021-09-07 ENCOUNTER — TRANSFERRED RECORDS (OUTPATIENT)
Dept: HEALTH INFORMATION MANAGEMENT | Facility: CLINIC | Age: 76
End: 2021-09-07

## 2021-09-07 VITALS
BODY MASS INDEX: 30.04 KG/M2 | OXYGEN SATURATION: 97 % | RESPIRATION RATE: 16 BRPM | WEIGHT: 153 LBS | HEIGHT: 60 IN | HEART RATE: 76 BPM | DIASTOLIC BLOOD PRESSURE: 61 MMHG | SYSTOLIC BLOOD PRESSURE: 124 MMHG | TEMPERATURE: 97.4 F

## 2021-09-07 DIAGNOSIS — C50.912 INVASIVE DUCTAL CARCINOMA OF BREAST, LEFT (H): ICD-10-CM

## 2021-09-07 PROCEDURE — 19285 PERQ DEV BREAST 1ST US IMAG: CPT | Mod: LT

## 2021-09-07 PROCEDURE — 76998 US GUIDE INTRAOP: CPT | Performed by: SURGERY

## 2021-09-07 PROCEDURE — 343N000001 HC RX 343: Performed by: SURGERY

## 2021-09-07 PROCEDURE — 19125 EXCISION BREAST LESION: CPT | Mod: LT | Performed by: SURGERY

## 2021-09-07 PROCEDURE — A9520 TC99 TILMANOCEPT DIAG 0.5MCI: HCPCS | Performed by: SURGERY

## 2021-09-07 PROCEDURE — 38792 RA TRACER ID OF SENTINL NODE: CPT

## 2021-09-07 PROCEDURE — 76098 X-RAY EXAM SURGICAL SPECIMEN: CPT

## 2021-09-07 PROCEDURE — 999N000065 MA POST PROCEDURE LEFT

## 2021-09-07 PROCEDURE — 38525 BIOPSY/REMOVAL LYMPH NODES: CPT | Mod: LT | Performed by: SURGERY

## 2021-09-07 PROCEDURE — 250N000009 HC RX 250: Performed by: SURGERY

## 2021-09-07 RX ORDER — PROPOFOL 10 MG/ML
INJECTION, EMULSION INTRAVENOUS CONTINUOUS PRN
Status: DISCONTINUED | OUTPATIENT
Start: 2021-09-07 | End: 2021-09-07

## 2021-09-07 RX ORDER — ONDANSETRON 2 MG/ML
4 INJECTION INTRAMUSCULAR; INTRAVENOUS EVERY 30 MIN PRN
Status: DISCONTINUED | OUTPATIENT
Start: 2021-09-07 | End: 2021-09-08 | Stop reason: HOSPADM

## 2021-09-07 RX ORDER — DEXAMETHASONE SODIUM PHOSPHATE 4 MG/ML
INJECTION, SOLUTION INTRA-ARTICULAR; INTRALESIONAL; INTRAMUSCULAR; INTRAVENOUS; SOFT TISSUE PRN
Status: DISCONTINUED | OUTPATIENT
Start: 2021-09-07 | End: 2021-09-07

## 2021-09-07 RX ORDER — SODIUM CHLORIDE, SODIUM LACTATE, POTASSIUM CHLORIDE, CALCIUM CHLORIDE 600; 310; 30; 20 MG/100ML; MG/100ML; MG/100ML; MG/100ML
INJECTION, SOLUTION INTRAVENOUS CONTINUOUS PRN
Status: DISCONTINUED | OUTPATIENT
Start: 2021-09-07 | End: 2021-09-07

## 2021-09-07 RX ORDER — CEFAZOLIN SODIUM 2 G/100ML
2 INJECTION, SOLUTION INTRAVENOUS
Status: COMPLETED | OUTPATIENT
Start: 2021-09-07 | End: 2021-09-07

## 2021-09-07 RX ORDER — ACETAMINOPHEN 325 MG/1
975 TABLET ORAL ONCE
Status: COMPLETED | OUTPATIENT
Start: 2021-09-07 | End: 2021-09-07

## 2021-09-07 RX ORDER — ONDANSETRON 4 MG/1
4 TABLET, ORALLY DISINTEGRATING ORAL EVERY 30 MIN PRN
Status: DISCONTINUED | OUTPATIENT
Start: 2021-09-07 | End: 2021-09-08 | Stop reason: HOSPADM

## 2021-09-07 RX ORDER — FENTANYL CITRATE 50 UG/ML
INJECTION, SOLUTION INTRAMUSCULAR; INTRAVENOUS PRN
Status: DISCONTINUED | OUTPATIENT
Start: 2021-09-07 | End: 2021-09-07

## 2021-09-07 RX ORDER — EPHEDRINE SULFATE 50 MG/ML
INJECTION, SOLUTION INTRAMUSCULAR; INTRAVENOUS; SUBCUTANEOUS PRN
Status: DISCONTINUED | OUTPATIENT
Start: 2021-09-07 | End: 2021-09-07

## 2021-09-07 RX ORDER — FENTANYL CITRATE 50 UG/ML
50 INJECTION, SOLUTION INTRAMUSCULAR; INTRAVENOUS EVERY 5 MIN PRN
Status: SHIPPED | OUTPATIENT
Start: 2021-09-07 | End: 2021-09-07

## 2021-09-07 RX ORDER — TRAMADOL HYDROCHLORIDE 50 MG/1
50 TABLET ORAL EVERY 6 HOURS PRN
Qty: 10 TABLET | Refills: 0 | Status: SHIPPED | OUTPATIENT
Start: 2021-09-07 | End: 2021-09-10

## 2021-09-07 RX ORDER — ONDANSETRON 2 MG/ML
INJECTION INTRAMUSCULAR; INTRAVENOUS PRN
Status: DISCONTINUED | OUTPATIENT
Start: 2021-09-07 | End: 2021-09-07

## 2021-09-07 RX ORDER — OXYCODONE HYDROCHLORIDE 5 MG/1
5 TABLET ORAL EVERY 4 HOURS PRN
Status: DISCONTINUED | OUTPATIENT
Start: 2021-09-07 | End: 2021-09-08 | Stop reason: HOSPADM

## 2021-09-07 RX ORDER — PROPOFOL 10 MG/ML
INJECTION, EMULSION INTRAVENOUS PRN
Status: DISCONTINUED | OUTPATIENT
Start: 2021-09-07 | End: 2021-09-07

## 2021-09-07 RX ORDER — FENTANYL CITRATE 50 UG/ML
50 INJECTION, SOLUTION INTRAMUSCULAR; INTRAVENOUS
Status: DISCONTINUED | OUTPATIENT
Start: 2021-09-07 | End: 2021-09-08 | Stop reason: HOSPADM

## 2021-09-07 RX ORDER — HYDROMORPHONE HCL IN WATER/PF 6 MG/30 ML
0.2 PATIENT CONTROLLED ANALGESIA SYRINGE INTRAVENOUS EVERY 5 MIN PRN
Status: DISCONTINUED | OUTPATIENT
Start: 2021-09-07 | End: 2021-09-08 | Stop reason: HOSPADM

## 2021-09-07 RX ORDER — LIDOCAINE 40 MG/G
CREAM TOPICAL
Status: DISCONTINUED | OUTPATIENT
Start: 2021-09-07 | End: 2021-09-08 | Stop reason: HOSPADM

## 2021-09-07 RX ORDER — SODIUM CHLORIDE, SODIUM LACTATE, POTASSIUM CHLORIDE, CALCIUM CHLORIDE 600; 310; 30; 20 MG/100ML; MG/100ML; MG/100ML; MG/100ML
INJECTION, SOLUTION INTRAVENOUS CONTINUOUS
Status: DISCONTINUED | OUTPATIENT
Start: 2021-09-07 | End: 2021-09-08 | Stop reason: HOSPADM

## 2021-09-07 RX ADMIN — ACETAMINOPHEN 975 MG: 325 TABLET ORAL at 09:43

## 2021-09-07 RX ADMIN — LIDOCAINE HYDROCHLORIDE 5 ML: 10 INJECTION, SOLUTION EPIDURAL; INFILTRATION; INTRACAUDAL; PERINEURAL at 08:54

## 2021-09-07 RX ADMIN — EPHEDRINE SULFATE 5 MG: 50 INJECTION, SOLUTION INTRAMUSCULAR; INTRAVENOUS; SUBCUTANEOUS at 12:15

## 2021-09-07 RX ADMIN — FENTANYL CITRATE 25 MCG: 50 INJECTION, SOLUTION INTRAMUSCULAR; INTRAVENOUS at 11:55

## 2021-09-07 RX ADMIN — FENTANYL CITRATE 50 MCG: 50 INJECTION, SOLUTION INTRAMUSCULAR; INTRAVENOUS at 11:42

## 2021-09-07 RX ADMIN — EPHEDRINE SULFATE 5 MG: 50 INJECTION, SOLUTION INTRAMUSCULAR; INTRAVENOUS; SUBCUTANEOUS at 12:13

## 2021-09-07 RX ADMIN — DEXAMETHASONE SODIUM PHOSPHATE 4 MG: 4 INJECTION, SOLUTION INTRA-ARTICULAR; INTRALESIONAL; INTRAMUSCULAR; INTRAVENOUS; SOFT TISSUE at 11:59

## 2021-09-07 RX ADMIN — SODIUM CHLORIDE, SODIUM LACTATE, POTASSIUM CHLORIDE, CALCIUM CHLORIDE: 600; 310; 30; 20 INJECTION, SOLUTION INTRAVENOUS at 11:41

## 2021-09-07 RX ADMIN — CEFAZOLIN SODIUM 2 G: 2 INJECTION, SOLUTION INTRAVENOUS at 11:48

## 2021-09-07 RX ADMIN — FENTANYL CITRATE 25 MCG: 50 INJECTION, SOLUTION INTRAMUSCULAR; INTRAVENOUS at 12:00

## 2021-09-07 RX ADMIN — TILMANOCEPT 0.49 MILLICURIE: KIT at 08:15

## 2021-09-07 RX ADMIN — PROPOFOL 120 MCG/KG/MIN: 10 INJECTION, EMULSION INTRAVENOUS at 11:43

## 2021-09-07 RX ADMIN — SODIUM CHLORIDE, SODIUM LACTATE, POTASSIUM CHLORIDE, CALCIUM CHLORIDE: 600; 310; 30; 20 INJECTION, SOLUTION INTRAVENOUS at 10:08

## 2021-09-07 RX ADMIN — ONDANSETRON 4 MG: 2 INJECTION INTRAMUSCULAR; INTRAVENOUS at 11:53

## 2021-09-07 RX ADMIN — PROPOFOL 30 MG: 10 INJECTION, EMULSION INTRAVENOUS at 12:23

## 2021-09-07 NOTE — INTERVAL H&P NOTE
I have reviewed the surgical (or preoperative) H&P that is linked to this encounter, and examined the patient. There are no significant changes.  Wire localization complete and imaging reviewed by myself.  All questions answered regarding procedure.  Consent obtained.  To the OR for left wire localized lumpectomy with sentinel lymph node biopsy.    Kinza Kent DO  General Surgeon  [facility]  Breast [place] - [address] [address]?  Office: [phone]  Employed by - [facility]

## 2021-09-07 NOTE — DISCHARGE INSTRUCTIONS
You received 975 mg of acetaminophen (Tylenol) at 9:43 AM. Do not exceed 4,000 mg of acetaminophen during a 24 hour period and keep in mind that acetaminophen can also be found in many over-the-counter cold medications as well as narcotics that may be given for pain.     If you have any questions or concerns regarding your procedure, please contact Dr. Kent, their office number is [phone].

## 2021-09-07 NOTE — ANESTHESIA CARE TRANSFER NOTE
Patient: Ariana Hodge    Procedure(s):  LEFT WIRE LOCALIZED LUMPECTOMY WITH SENTINEL LYMPH NODE BIOPSY    Diagnosis: Invasive ductal carcinoma of breast, left (H) [C50.912]  Diagnosis Additional Information: No value filed.    Anesthesia Type:   MAC     Note:    Oropharynx: oropharynx clear of all foreign objects  Level of Consciousness: awake  Oxygen Supplementation: face mask  Level of Supplemental Oxygen (L/min / FiO2): 6  Independent Airway: airway patency satisfactory and stable  Dentition: dentition unchanged  Vital Signs Stable: post-procedure vital signs reviewed and stable  Report to RN Given: handoff report given  Patient transferred to: Phase II    Handoff Report: Identifed the Patient, Identified the Reponsible Provider, Reviewed the pertinent medical history, Discussed the surgical course, Reviewed Intra-OP anesthesia mangement and issues during anesthesia, Set expectations for post-procedure period and Allowed opportunity for questions and acknowledgement of understanding      Vitals:  Vitals Value Taken Time   /58 09/07/21 1246   Temp 97.4  F (36.3  C) 09/07/21 1246   Pulse 69 09/07/21 1247   Resp 16 09/07/21 1246   SpO2 99 % 09/07/21 1247   Vitals shown include unvalidated device data.    Electronically Signed By: Ny Henley CRNA, TUSHAR OLVERA  September 7, 2021  12:51 PM

## 2021-09-07 NOTE — ANESTHESIA POSTPROCEDURE EVALUATION
Patient: Ariana Hodge    Procedure(s):  LEFT WIRE LOCALIZED LUMPECTOMY WITH SENTINEL LYMPH NODE BIOPSY    Diagnosis:Invasive ductal carcinoma of breast, left (H) [C50.912]  Diagnosis Additional Information: No value filed.    Anesthesia Type:  MAC    Note:  Disposition: Outpatient   Postop Pain Control: Uneventful            Sign Out: Well controlled pain   PONV: No   Neuro/Psych: Uneventful            Sign Out: Acceptable/Baseline neuro status   Airway/Respiratory: Uneventful            Sign Out: Acceptable/Baseline resp. status   CV/Hemodynamics: Uneventful            Sign Out: Acceptable CV status; No obvious hypovolemia; No obvious fluid overload   Other NRE: NONE   DID A NON-ROUTINE EVENT OCCUR? No           Last vitals:  Vitals Value Taken Time   /58 09/07/21 1246   Temp 97.4  F (36.3  C) 09/07/21 1246   Pulse 75 09/07/21 1256   Resp 16 09/07/21 1246   SpO2 94 % 09/07/21 1256   Vitals shown include unvalidated device data.    Electronically Signed By: Saida Mishra MD  September 7, 2021  12:59 PM

## 2021-09-07 NOTE — OP NOTE
Name:  Ariana HAMLIN Ayesha  PCP:  Isabella Garsia  Procedure Date:  9/7/2021      Procedure(s):  LEFT WIRE LOCALIZED LUMPECTOMY WITH SENTINEL LYMPH NODE BIOPSY     Pre-Procedure Diagnosis:  Invasive ductal carcinoma of breast, left     Post-Procedure Diagnosis:    Same    Anesthesia Type:    MAC with local    Estimated Blood Loss:   5cc    Specimens:    Left lumpectomy  Left axillary sentinel lymph node    Complications:    None apparent    Findings:  Clip and target lesion within excised tissue    Indication for Procedure:  This is a 75-year-old female who recently had a screening mammogram.  A new asymmetry was identified within the left breast.  Further diagnostic work-up revealed an invasive ductal carcinoma.  She has elected for breast preservation therapy for treatment.    Operative Report:    After informed consent was obtained, and the risks and benefits of the procedure were discussed, the patient was brought back to the operative suite and placed in the supine position.  Preoperatively, a localization wire was placed and the breast was injected with Lymphoseek.  MAC anesthesia was provided by the department of anesthesia.  Ultrasound was utilized by myself to visualize the clip within the breast parenchyma and to better plan the surgical incision.  The incision ultimately was 1 fingerbreadth superior and medial to the wire entrance site.  The left breast and axilla were prepped and draped in the usual sterile fashion.  After infiltration with a combination of 1% lidocaine and quarter percent Marcaine a curvilinear shaped incision was made over the lesion in the breast at 10:00 zone 3.  This was carried down through the subcutaneous tissues and the wire was delivered into the incision.  I then went around the wire and mass widely with electrocautery.  The posterior margin was the chest wall.  Once removed, the specimen was marked for orientation and sent to radiology, who confirmed removal of the  clip and tumor.  The specimen was then sent to pathology for permanent sectioning.  A curvilinear incision was then made in the lower portion of the axilla, after local infiltration, and carried down deep into the axillary fat pad.  The clavipectoral fascia was incised and the axilla was palpated for abnormalities.  No abnormal lymph nodes were palpated.  Using the gamma probe I identified one sentinel lymph node, with a count of 90.  This was removed with electrocautery and sent for permanent sectioning.  There was no significant remaining gamma activity.  Both wounds are inspected for hemostasis and closed with 3-0 Vicryl to the subcutaneous tissues, followed by interrupted 4-0 Vicryl for the deep dermis, and a subcuticular stitch of 4-0 Monocryl to the skin.  Dermabond was then placed over the incisions.    Instrument, sponge, and needle counts were correct at closure and at the conclusion of the case.     Disposition:  The patient tolerated the procedure well.  They were transferred to the postanesthesia care unit in stable condition.      Kinza Kent DO  General Surgeon  [facility]  Breast [address] [address]  Office: [phone]  Employed by - [facility]

## 2021-09-07 NOTE — ANESTHESIA PREPROCEDURE EVALUATION
Anesthesia Pre-Procedure Evaluation    Patient: Ariana Hodge   MRN: [id number] : 1945        Preoperative Diagnosis: Invasive ductal carcinoma of breast, left (H) [C50.912]   Procedure : Procedure(s):  LEFT WIRE LOCALIZED LUMPECTOMY WITH SENTINEL LYMPH NODE BIOPSY     Past Medical History:   Diagnosis Date     CHF (congestive heart failure) (H) 2020     Gastroesophageal reflux disease      Hypertension      Inverted nipple     both breasts ever since she had her breast reduction surgery     Reflux esophagitis      Skin cancer       Past Surgical History:   Procedure Laterality Date     CL AFF SURGICAL PATHOLOGY       ENT SURGERY       HC REDUCTION OF LARGE BREAST      Description: Breast Surgery Reduction Procedure;  Proc Date: 2006;     MAMMOPLASTY REDUCTION Bilateral In the       Allergies   Allergen Reactions     Cortisone Other (See Comments)     Orally causes Depression     Amiloride      Other reaction(s): *Unknown Patient does not recall every having this med      Social History     Tobacco Use     Smoking status: Never Smoker     Smokeless tobacco: Never Used   Substance Use Topics     Alcohol use: Yes     Alcohol/week: 0.0 standard drinks      Wt Readings from Last 1 Encounters:   21 69.4 kg (153 lb)        Anesthesia Evaluation   Pt has had prior anesthetic.     No history of anesthetic complications       ROS/MED HX  ENT/Pulmonary:  - neg pulmonary ROS     Neurologic:  - neg neurologic ROS     Cardiovascular:     (+) Dyslipidemia hypertension-----    METS/Exercise Tolerance:     Hematologic:  - neg hematologic  ROS     Musculoskeletal:  - neg musculoskeletal ROS     GI/Hepatic:  - neg GI/hepatic ROS     Renal/Genitourinary:  - neg Renal ROS     Endo:  - neg endo ROS     Psychiatric/Substance Use:  - neg psychiatric ROS     Infectious Disease:  - neg infectious disease ROS     Malignancy:   (+) Malignancy, History of Breast.    Other:            Physical  Exam    Airway        Mallampati: I   TM distance: > 3 FB   Neck ROM: full   Mouth opening: > 3 cm    Respiratory Devices and Support         Dental  no notable dental history         Cardiovascular          Rhythm and rate: regular and normal     Pulmonary           breath sounds clear to auscultation           OUTSIDE LABS:  CBC:   Lab Results   Component Value Date    WBC 5.3 01/31/2019    HGB 14.5 09/02/2021    HGB 13.4 01/31/2019    HCT 39.6 01/31/2019     01/31/2019     BMP:   Lab Results   Component Value Date     09/02/2021     05/17/2021    POTASSIUM 3.4 (L) 09/02/2021    POTASSIUM 3.9 05/17/2021    CHLORIDE 97 (L) 09/02/2021    CHLORIDE 105 05/17/2021    CO2 28 09/02/2021    CO2 24 05/17/2021    BUN 12 09/02/2021    BUN 15 05/17/2021    CR 0.87 09/02/2021    CR 0.74 05/17/2021    GLC 86 09/02/2021    GLC 88 05/17/2021     COAGS: No results found for: PTT, INR, FIBR  POC: No results found for: BGM, HCG, HCGS  HEPATIC:   Lab Results   Component Value Date    ALBUMIN 3.9 05/17/2021    PROTTOTAL 7.0 05/17/2021    ALT 31 05/17/2021    AST 30 05/17/2021    ALKPHOS 74 05/17/2021    BILITOTAL 0.5 05/17/2021     OTHER:   Lab Results   Component Value Date    A1C 5.0 01/31/2019    ATILIO 9.8 09/02/2021       Anesthesia Plan    ASA Status:  3      Anesthesia Type: MAC.              Consents    Anesthesia Plan(s) and associated risks, benefits, and realistic alternatives discussed. Questions answered and patient/representative(s) expressed understanding.     - Discussed with:  Patient         Postoperative Care            Comments:                Saida Mishra MD

## 2021-09-20 ENCOUNTER — OFFICE VISIT (OUTPATIENT)
Dept: SURGERY | Facility: CLINIC | Age: 76
End: 2021-09-20
Attending: FAMILY MEDICINE
Payer: COMMERCIAL

## 2021-09-20 DIAGNOSIS — C50.912 INVASIVE DUCTAL CARCINOMA OF BREAST, STAGE 1, LEFT (H): Primary | ICD-10-CM

## 2021-09-20 PROCEDURE — G0463 HOSPITAL OUTPT CLINIC VISIT: HCPCS

## 2021-09-20 PROCEDURE — 99024 POSTOP FOLLOW-UP VISIT: CPT | Performed by: SURGERY

## 2021-09-20 NOTE — LETTER
9/20/2021         RE: Ariana Hodge  [address] [place] [address]        Dear Colleague,    Thank you for referring your patient, Ariana Hodge, to the [facility] [place]. Please see a copy of my visit note below.    History:  Ariana Hodge is s/p left lumpectomy with SLN biopsy on 9/7/21.  She is a little sore in the left breast and axilla. She only took Tylenol for a few days after surgery.    Physical exam:  BREAST: Left upper inner quadrant and left axillary incisions are healing well. Small amount of glue over incisions. Firm ridge of healing tissue present but no evidence of underlying seroma or hematoma.    Pathology:  MICROSCOPIC AND DIAGNOSIS:   A) LEFT BREAST, ORIENTED LUMPECTOMY:        1) INVASIVE DUCTAL CARCINOMA             a) Grade: Luis grade II (of III)             b) Size:  15 x 13 x 8 mm             c) Margins: Uninvolved, 2 mm from the nearest inferior and medial margins, 5 mm from lateral margin        2) DUCTAL CARCINOMA IN SITU: Not identified        3) ADDITIONAL FINDINGS:        Focally proliferative fibrocystic changes with focal duct ectasia, focal columnar cell hyperplasia and focal flat cell atypia, focal usual and focal atypical ductal hyperplasia        Abundant background adipose tissue and atrophic ductal/lobular units        4) Previous biopsy site present, with cavity formation and organizing changes     B) LEFT SENTINEL LYMPH NODE, BIOPSY:        - TWO BENIGN LYMPH NODES (0/2) WITH NO EVIDENCE OF METASTATIC CARCINOMA     PATHOLOGIC STAGE: pT1c, (sn)pN0, pM-Not applicable     ASSESSMENT:  Ariana Hodge is s/p left lumpectomy for invasive ductal carcinoma    - grade II, T1, N0, ER/NJ+  -->  Pathologic prognostic stage IA    PLAN:  Okay for physical activities as tolerated  Pathology was discussed  Referral placed for medical oncology  Will be discussing her case at breast tumor board later this week. I will call her if radiation  oncology wants to see her.  Continue with yearly mammograms  Return to the breast clinic in 1 year, or earlier as needed    Kinza Kent DO  General Surgeon  [facility]  Breast Center [address] [address]  Office: [phone]  Employed by - [facility]        Again, thank you for allowing me to participate in the care of your patient.        Sincerely,        Kinza Kent DO

## 2021-09-20 NOTE — NURSING NOTE
Ariana presents to [facility] for a post op appointment with Dr. Kent .  She isaccompanied by herself for appointment.  She states she is doing well, minimal pain.  She has good ROM, reviewed ROM exercises with her.  Patient met with Dr. Kent .  See dictation for details of visit.  She will plan to be referred onto Medical Oncology and will plan to follow up with Dr. Kent  in one year with bilateral mammograms.  Invited calls sooner if she has any questions.  RN time 12 mins.

## 2021-09-20 NOTE — PROGRESS NOTES
History:  Ariana Hodge is s/p left lumpectomy with SLN biopsy on 9/7/21.  She is a little sore in the left breast and axilla. She only took Tylenol for a few days after surgery.    Physical exam:  BREAST: Left upper inner quadrant and left axillary incisions are healing well. Small amount of glue over incisions. Firm ridge of healing tissue present but no evidence of underlying seroma or hematoma.    Pathology:  MICROSCOPIC AND DIAGNOSIS:   A) LEFT BREAST, ORIENTED LUMPECTOMY:        1) INVASIVE DUCTAL CARCINOMA             a) Grade: Page grade II (of III)             b) Size:  15 x 13 x 8 mm             c) Margins: Uninvolved, 2 mm from the nearest inferior and medial margins, 5 mm from lateral margin        2) DUCTAL CARCINOMA IN SITU: Not identified        3) ADDITIONAL FINDINGS:        Focally proliferative fibrocystic changes with focal duct ectasia, focal columnar cell hyperplasia and focal flat cell atypia, focal usual and focal atypical ductal hyperplasia        Abundant background adipose tissue and atrophic ductal/lobular units        4) Previous biopsy site present, with cavity formation and organizing changes     B) LEFT SENTINEL LYMPH NODE, BIOPSY:        - TWO BENIGN LYMPH NODES (0/2) WITH NO EVIDENCE OF METASTATIC CARCINOMA     PATHOLOGIC STAGE: pT1c, (sn)pN0, pM-Not applicable     ASSESSMENT:  Ariana Hodge is s/p left lumpectomy for invasive ductal carcinoma    - grade II, T1, N0, ER/GA+  -->  Pathologic prognostic stage IA    PLAN:  Okay for physical activities as tolerated  Pathology was discussed  Referral placed for medical oncology  Will be discussing her case at breast tumor board later this week. I will call her if radiation oncology wants to see her.  Continue with yearly mammograms  Return to the breast clinic in 1 year, or earlier as needed    Kinza Kent DO  General Surgeon  [facility]  Breast [address]  [address]  Office: [phone]  Employed by [facility]

## 2021-09-21 ENCOUNTER — PATIENT OUTREACH (OUTPATIENT)
Dept: ONCOLOGY | Facility: CLINIC | Age: 76
End: 2021-09-21

## 2021-09-21 NOTE — PROGRESS NOTES
New Patient Oncology Nurse Navigator Note     Referring provider: Kinza Kent MD     Referring Clinic/Organization: [facility]     Referred to (specialty): Medical Oncology    Requested provider (if applicable): N/A     Date Referral Received: 9/20/21     Evaluation for : Breast Cancer     Clinical History (per Nurse review of records provided):      Ariana Hodge is s/p left lumpectomy with SLN biopsy on 9/7/21.    Pathology:  MICROSCOPIC AND DIAGNOSIS:   A) LEFT BREAST, ORIENTED LUMPECTOMY:        1) INVASIVE DUCTAL CARCINOMA             a) Grade: Luis grade II (of III)             b) Size:  15 x 13 x 8 mm             c) Margins: Uninvolved, 2 mm from the nearest inferior and medial margins, 5 mm from lateral margin        2) DUCTAL CARCINOMA IN SITU: Not identified        3) ADDITIONAL FINDINGS:        Focally proliferative fibrocystic changes with focal duct ectasia, focal columnar cell hyperplasia and focal flat cell atypia, focal usual and focal atypical ductal hyperplasia        Abundant background adipose tissue and atrophic ductal/lobular units        4) Previous biopsy site present, with cavity formation and organizing changes     B) LEFT SENTINEL LYMPH NODE, BIOPSY:        - TWO BENIGN LYMPH NODES (0/2) WITH NO EVIDENCE OF METASTATIC CARCINOMA     PATHOLOGIC STAGE: pT1c, (sn)pN0, pM-Not applicable

## 2021-10-04 ENCOUNTER — ONCOLOGY VISIT (OUTPATIENT)
Dept: ONCOLOGY | Facility: CLINIC | Age: 76
End: 2021-10-04
Attending: SURGERY
Payer: COMMERCIAL

## 2021-10-04 VITALS
SYSTOLIC BLOOD PRESSURE: 132 MMHG | WEIGHT: 151.1 LBS | HEIGHT: 59 IN | OXYGEN SATURATION: 96 % | RESPIRATION RATE: 16 BRPM | DIASTOLIC BLOOD PRESSURE: 68 MMHG | HEART RATE: 68 BPM | BODY MASS INDEX: 30.46 KG/M2

## 2021-10-04 DIAGNOSIS — C50.912 INVASIVE DUCTAL CARCINOMA OF BREAST, STAGE 1, LEFT (H): Primary | ICD-10-CM

## 2021-10-04 PROCEDURE — 99205 OFFICE O/P NEW HI 60 MIN: CPT | Performed by: INTERNAL MEDICINE

## 2021-10-04 PROCEDURE — G0463 HOSPITAL OUTPT CLINIC VISIT: HCPCS

## 2021-10-04 RX ORDER — ANASTROZOLE 1 MG/1
1 TABLET ORAL DAILY
Qty: 90 TABLET | Refills: 3 | Status: SHIPPED | OUTPATIENT
Start: 2021-10-04 | End: 2022-10-14

## 2021-10-04 ASSESSMENT — MIFFLIN-ST. JEOR: SCORE: 1086.02

## 2021-10-04 ASSESSMENT — PAIN SCALES - GENERAL: PAINLEVEL: NO PAIN (0)

## 2021-10-04 NOTE — LETTER
"    10/4/2021         RE: Ariana Hodge  [address] [place] [address]        Dear Colleague,    Thank you for referring your patient, Ariana Hodge, to the [facility]. Please see a copy of my visit note below.    Oncology Rooming Note    October 4, 2021 11:20 AM   Ariana Hodge is a 75 year old female who presents for:    Chief Complaint   Patient presents with     Oncology Clinic Visit     new consult     Breast Cancer     Initial Vitals: /68 (BP Location: Left arm, Cuff Size: Adult Regular)   Pulse 68   Resp 16   Ht 1.499 m (4' 11\")   Wt 68.5 kg (151 lb 1.6 oz)   SpO2 96%   BMI 30.52 kg/m   Estimated body mass index is 30.52 kg/m  as calculated from the following:    Height as of this encounter: 1.499 m (4' 11\").    Weight as of this encounter: 68.5 kg (151 lb 1.6 oz). Body surface area is 1.69 meters squared.  No Pain (0) Comment: Data Unavailable   No LMP recorded. Patient is postmenopausal.  Allergies reviewed: Yes  Medications reviewed: Yes    Medications: Medication refills not needed today.  Pharmacy name entered into [facility]: [id number] - [place], [place] - [address]    Clinical concerns: new consult     Iris Obrien              [facility] Hematology and Oncology Consult Note      Patient: Ariana Hodge  MRN: [id number]  Date of Service: Oct 4, 2021      We have been asked by Dr. Kent to evaluate Ariana Hodge for breast cancer.    Assessment/Plan:    1.  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  We also reviewed prognostic markers.  She has favorable findings being HER-2 negative, ER positive, node negative with a relatively small tumor.  I am recommending adjuvant endocrine therapy for her.  The rationale for this was reviewed.  I reviewed with her that she has a very low risk of recurrence which can be further decreased with Arimidex.  We reviewed some of the more common " side effects of the medication which include, but are not limited to, hot flashes, sweats, arthralgias and myalgias, and increased bone density.  She had a DEXA scan in 2017 which was normal.  We will repeat one now.  Also recommended that she continue calcium and vitamin D supplementation.  A prescription for Arimidex was sent to her pharmacy.  She was given written information on the medication to take, and review.  Questions were answered.  We will see her back in clinic in 2 months.  I do not think she needs radiation or chemotherapy.  I will order Oncotype to confirm.    ECOG Performance  0    Staging History:    Cancer Staging  No matching staging information was found for the patient.    History:    Ariana is a 75-year-old woman who presents with a new diagnosis of breast cancer.  This was initially found on a screening mammogram.  This was performed on July 26 which showed some indeterminate calcifications at the 6 o'clock position in the left breast as well as a mass at the 10 o'clock position of the left breast.  Ultrasound was done which revealed microcalcifications at the 6 o'clock position which were indeterminate and a mass at the 9 o'clock position with an irregular margin.  The mass measured 6 x 6 x 7 mm.  The left breast was negative for any abnormal lymphadenopathy.  A biopsy was performed on August 16 in both areas.  The area at 6:00 was negative.  The mass at 9:00 showed a grade 2 invasive ductal carcinoma, ER/CO positive and HER-2/josh negative by FISH.  She then went on to have breast conservation surgery on September 7, 2021.  Final pathology showed an invasive ductal carcinoma, grade 2, measuring 15 x 13 x 8 mm.  Margins were negative.  2 sentinel nodes were taken and were negative.  She is here to discuss adjuvant endocrine therapy.  She has been doing well postoperatively.  No issues with wound healing.  No complaints today.    Past History:  Past Medical History:   Diagnosis Date     CHF  (congestive heart failure) (H) 2/26/2020     Gastroesophageal reflux disease      Hypertension      Inverted nipple     both breasts ever since she had her breast reduction surgery     Reflux esophagitis      Skin cancer     Family History   Problem Relation Age of Onset     Hypertension Mother      Cerebrovascular Disease Mother      Cerebrovascular Disease Father      Cerebrovascular Disease Brother      Depression Brother       [unfilled] Social History     Socioeconomic History     Marital status:      Spouse name: Not on file     Number of children: Not on file     Years of education: Not on file     Highest education level: Not on file   Occupational History     Not on file   Tobacco Use     Smoking status: Never Smoker     Smokeless tobacco: Never Used   Substance and Sexual Activity     Alcohol use: Yes     Alcohol/week: 0.0 standard drinks     Drug use: Not Currently     Sexual activity: Not on file   Other Topics Concern     Not on file   Social History Narrative     Not on file     Social Determinants of Health     Financial Resource Strain:      Difficulty of Paying Living Expenses:    Food Insecurity:      Worried About Running Out of Food in the Last Year:      Ran Out of Food in the Last Year:    Transportation Needs:      Lack of Transportation (Medical):      Lack of Transportation (Non-Medical):    Physical Activity:      Days of Exercise per Week:      Minutes of Exercise per Session:    Stress:      Feeling of Stress :    Social Connections:      Frequency of Communication with Friends and Family:      Frequency of Social Gatherings with Friends and Family:      Attends [employer] Services:      Active Member of Clubs or Organizations:      Attends Club or Organization Meetings:      Marital Status:    Intimate Partner Violence:      Fear of Current or Ex-Partner:      Emotionally Abused:      Physically Abused:      Sexually Abused:         Allergies:    Allergies   Allergen Reactions      "Cortisone Other (See Comments)     Orally causes Depression     Amiloride      Other reaction(s): *Unknown Patient does not recall every having this med     Review of Systems:    As above in the history.     Review of Systems otherwise Negative for:  General: chills, fever or night sweats  Psychological: anxiety or depression  Ophthalmic: blurry vision, double vision or loss of vision, vision change  ENT: epistaxis, oral lesions, hearing changes  Hematological and Lymphatic: bleeding, bruising, jaundice, swollen lymph nodes  Endocrine: hot flashes, unexpected weight changes  Respiratory: cough, hemoptysis, orthopnea or shortness of breath/CHU  Cardiovascular: chest pain, edema, palpitations or PND  Gastrointestinal: abdominal pain, blood in stools, change in bowel habits, constipation, diarrhea or nausea/vomiting  Genito-Urinary: change in urinary stream, incontinence, frequency/urgency  Musculoskeletal: joint pain, stiffness, swelling, muscle pain  Neurological: dizziness, headaches, numbness/tingling  Dermatological: lumps and rash    Physical Exam:    /68 (BP Location: Left arm, Cuff Size: Adult Regular)   Pulse 68   Resp 16   Ht 1.499 m (4' 11\")   Wt 68.5 kg (151 lb 1.6 oz)   SpO2 96%   BMI 30.52 kg/m      General: patient appears stated age of 75 year old. Nontoxic and in no distress.   HEENT: Head: atraumatic, normocephalic. Sclerae anicteric.  Chest:  Normal respiratory effort  Cardiac:  No edema.   Abdomen: abdomen is non-distended  Extremities: normal tone and muscle bulk.   Skin: no lesions or rash on visible skin. Warm and dry.   CNS: alert and oriented. Grossly non-focal.   Psychiatric: normal mood and affect.     Lab Results:    No results found for this or any previous visit (from the past 168 hour(s)).    Imaging Results:    NM Lymphoscintigraphy Injection only    Result Date: 9/7/2021  INDICATION: Pre-operative localization of invasive ductal carcinoma at 9 o'clock, 7 cm from the nipple. " PROCEDURE: Informed consent was obtained from the patient. The breast was cleansed with ChloraPrep. Lidocaine was used for local anesthesia. A -gauge needle was then advanced to the area of abnormality. A localization wire was then deployed. Post-procedure mammograms demonstrate the localization wire in appropriate position. The previously placed marker was visualized. The patient tolerated this well. IMPRESSION: Sonographically guided left wire localization. A specimen was sent for radiography. Specimen radiograph demonstrates the area of abnormality and the localization wire to be included in the specimen INDICATION: Breast Carcinoma. Pre-operative identification of the left sentinel lymph node. PROCEDURE: Informed consent was obtained from the patient. The skin was prepped with ChloraPrep.  488uCi Tc99m Lymphoseek was injected subdermally along the upper outer aspect of the areolar margin. The patient tolerated this well. IMPRESSION: [place] lymph node injection     MA Post Procedure Left    Result Date: 9/7/2021  INDICATION: Pre-operative localization of invasive ductal carcinoma at 9 o'clock, 7 cm from the nipple. PROCEDURE: Informed consent was obtained from the patient. The breast was cleansed with ChloraPrep. Lidocaine was used for local anesthesia. A -gauge needle was then advanced to the area of abnormality. A localization wire was then deployed. Post-procedure mammograms demonstrate the localization wire in appropriate position. The previously placed marker was visualized. The patient tolerated this well. IMPRESSION: Sonographically guided left wire localization. A specimen was sent for radiography. Specimen radiograph demonstrates the area of abnormality and the localization wire to be included in the specimen INDICATION: Breast Carcinoma. Pre-operative identification of the left sentinel lymph node. PROCEDURE: Informed consent was obtained from the patient. The skin was prepped with ChloraPrep.  488uCi  Tc99m Lymphoseek was injected subdermally along the upper outer aspect of the areolar margin. The patient tolerated this well. IMPRESSION: [place] lymph node injection     US Breast Wire Placement 1st Lesion Left    Result Date: 9/7/2021  INDICATION: Pre-operative localization of invasive ductal carcinoma at 9 o'clock, 7 cm from the nipple. PROCEDURE: Informed consent was obtained from the patient. The breast was cleansed with ChloraPrep. Lidocaine was used for local anesthesia. A -gauge needle was then advanced to the area of abnormality. A localization wire was then deployed. Post-procedure mammograms demonstrate the localization wire in appropriate position. The previously placed marker was visualized. The patient tolerated this well. IMPRESSION: Sonographically guided left wire localization. A specimen was sent for radiography. Specimen radiograph demonstrates the area of abnormality and the localization wire to be included in the specimen INDICATION: Breast Carcinoma. Pre-operative identification of the left sentinel lymph node. PROCEDURE: Informed consent was obtained from the patient. The skin was prepped with ChloraPrep.  488uCi Tc99m Lymphoseek was injected subdermally along the upper outer aspect of the areolar margin. The patient tolerated this well. IMPRESSION: [place] lymph node injection     MA Breast Specimen Left    Result Date: 9/7/2021  INDICATION: Pre-operative localization of invasive ductal carcinoma at 9 o'clock, 7 cm from the nipple. PROCEDURE: Informed consent was obtained from the patient. The breast was cleansed with ChloraPrep. Lidocaine was used for local anesthesia. A -gauge needle was then advanced to the area of abnormality. A localization wire was then deployed. Post-procedure mammograms demonstrate the localization wire in appropriate position. The previously placed marker was visualized. The patient tolerated this well. IMPRESSION: Sonographically guided left wire localization. A  specimen was sent for radiography. Specimen radiograph demonstrates the area of abnormality and the localization wire to be included in the specimen INDICATION: Breast Carcinoma. Pre-operative identification of the left sentinel lymph node. PROCEDURE: Informed consent was obtained from the patient. The skin was prepped with ChloraPrep.  488uCi Tc99m Lymphoseek was injected subdermally along the upper outer aspect of the areolar margin. The patient tolerated this well. IMPRESSION: [place] lymph node injection       Signed by: Tommy Caputo MD        Again, thank you for allowing me to participate in the care of your patient.        Sincerely,        Tommy Caputo MD

## 2021-10-04 NOTE — PROGRESS NOTES
[facility] Hematology and Oncology Consult Note      Patient: Ariana Hodge  MRN: [id number]  Date of Service: Oct 4, 2021      We have been asked by Dr. Kent to evaluate Ariana Hodge for breast cancer.    Assessment/Plan:    1.  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  We also reviewed prognostic markers.  She has favorable findings being HER-2 negative, ER positive, node negative with a relatively small tumor.  I am recommending adjuvant endocrine therapy for her.  The rationale for this was reviewed.  I reviewed with her that she has a very low risk of recurrence which can be further decreased with Arimidex.  We reviewed some of the more common side effects of the medication which include, but are not limited to, hot flashes, sweats, arthralgias and myalgias, and increased bone density.  She had a DEXA scan in 2017 which was normal.  We will repeat one now.  Also recommended that she continue calcium and vitamin D supplementation.  A prescription for Arimidex was sent to her pharmacy.  She was given written information on the medication to take, and review.  Questions were answered.  We will see her back in clinic in 2 months.  I do not think she needs radiation or chemotherapy.  I will order Oncotype to confirm.    ECOG Performance  0    Staging History:    Cancer Staging  No matching staging information was found for the patient.    History:    Ariana is a 75-year-old woman who presents with a new diagnosis of breast cancer.  This was initially found on a screening mammogram.  This was performed on July 26 which showed some indeterminate calcifications at the 6 o'clock position in the left breast as well as a mass at the 10 o'clock position of the left breast.  Ultrasound was done which revealed microcalcifications at the 6 o'clock position which were indeterminate and a mass at the 9 o'clock position with an irregular margin.  The mass measured 6 x  6 x 7 mm.  The left breast was negative for any abnormal lymphadenopathy.  A biopsy was performed on August 16 in both areas.  The area at 6:00 was negative.  The mass at 9:00 showed a grade 2 invasive ductal carcinoma, ER/IN positive and HER-2/josh negative by FISH.  She then went on to have breast conservation surgery on September 7, 2021.  Final pathology showed an invasive ductal carcinoma, grade 2, measuring 15 x 13 x 8 mm.  Margins were negative.  2 sentinel nodes were taken and were negative.  She is here to discuss adjuvant endocrine therapy.  She has been doing well postoperatively.  No issues with wound healing.  No complaints today.    Past History:  Past Medical History:   Diagnosis Date     CHF (congestive heart failure) (H) 2/26/2020     Gastroesophageal reflux disease      Hypertension      Inverted nipple     both breasts ever since she had her breast reduction surgery     Reflux esophagitis      Skin cancer     Family History   Problem Relation Age of Onset     Hypertension Mother      Cerebrovascular Disease Mother      Cerebrovascular Disease Father      Cerebrovascular Disease Brother      Depression Brother       [unfilled] Social History     Socioeconomic History     Marital status:      Spouse name: Not on file     Number of children: Not on file     Years of education: Not on file     Highest education level: Not on file   Occupational History     Not on file   Tobacco Use     Smoking status: Never Smoker     Smokeless tobacco: Never Used   Substance and Sexual Activity     Alcohol use: Yes     Alcohol/week: 0.0 standard drinks     Drug use: Not Currently     Sexual activity: Not on file   Other Topics Concern     Not on file   Social History Narrative     Not on file     Social Determinants of Health     Financial Resource Strain:      Difficulty of Paying Living Expenses:    Food Insecurity:      Worried About Running Out of Food in the Last Year:      Ran Out of Food in the Last Year:   "  Transportation Needs:      Lack of Transportation (Medical):      Lack of Transportation (Non-Medical):    Physical Activity:      Days of Exercise per Week:      Minutes of Exercise per Session:    Stress:      Feeling of Stress :    Social Connections:      Frequency of Communication with Friends and Family:      Frequency of Social Gatherings with Friends and Family:      Attends Muslim Services:      Active Member of Clubs or Organizations:      Attends Club or Organization Meetings:      Marital Status:    Intimate Partner Violence:      Fear of Current or Ex-Partner:      Emotionally Abused:      Physically Abused:      Sexually Abused:         Allergies:    Allergies   Allergen Reactions     Cortisone Other (See Comments)     Orally causes Depression     Amiloride      Other reaction(s): *Unknown Patient does not recall every having this med     Review of Systems:    As above in the history.     Review of Systems otherwise Negative for:  General: chills, fever or night sweats  Psychological: anxiety or depression  Ophthalmic: blurry vision, double vision or loss of vision, vision change  ENT: epistaxis, oral lesions, hearing changes  Hematological and Lymphatic: bleeding, bruising, jaundice, swollen lymph nodes  Endocrine: hot flashes, unexpected weight changes  Respiratory: cough, hemoptysis, orthopnea or shortness of breath/CHU  Cardiovascular: chest pain, edema, palpitations or PND  Gastrointestinal: abdominal pain, blood in stools, change in bowel habits, constipation, diarrhea or nausea/vomiting  Genito-Urinary: change in urinary stream, incontinence, frequency/urgency  Musculoskeletal: joint pain, stiffness, swelling, muscle pain  Neurological: dizziness, headaches, numbness/tingling  Dermatological: lumps and rash    Physical Exam:    /68 (BP Location: Left arm, Cuff Size: Adult Regular)   Pulse 68   Resp 16   Ht 1.499 m (4' 11\")   Wt 68.5 kg (151 lb 1.6 oz)   SpO2 96%   BMI 30.52 " kg/m      General: patient appears stated age of 75 year old. Nontoxic and in no distress.   HEENT: Head: atraumatic, normocephalic. Sclerae anicteric.  Chest:  Normal respiratory effort  Cardiac:  No edema.   Abdomen: abdomen is non-distended  Extremities: normal tone and muscle bulk.   Skin: no lesions or rash on visible skin. Warm and dry.   CNS: alert and oriented. Grossly non-focal.   Psychiatric: normal mood and affect.     Lab Results:    No results found for this or any previous visit (from the past 168 hour(s)).    Imaging Results:    NM Lymphoscintigraphy Injection only    Result Date: 9/7/2021  INDICATION: Pre-operative localization of invasive ductal carcinoma at 9 o'clock, 7 cm from the nipple. PROCEDURE: Informed consent was obtained from the patient. The breast was cleansed with ChloraPrep. Lidocaine was used for local anesthesia. A -gauge needle was then advanced to the area of abnormality. A localization wire was then deployed. Post-procedure mammograms demonstrate the localization wire in appropriate position. The previously placed marker was visualized. The patient tolerated this well. IMPRESSION: Sonographically guided left wire localization. A specimen was sent for radiography. Specimen radiograph demonstrates the area of abnormality and the localization wire to be included in the specimen INDICATION: Breast Carcinoma. Pre-operative identification of the left sentinel lymph node. PROCEDURE: Informed consent was obtained from the patient. The skin was prepped with ChloraPrep.  488uCi Tc99m Lymphoseek was injected subdermally along the upper outer aspect of the areolar margin. The patient tolerated this well. IMPRESSION: [place] lymph node injection     MA [place] Procedure Left    Result Date: 9/7/2021  INDICATION: Pre-operative localization of invasive ductal carcinoma at 9 o'clock, 7 cm from the nipple. PROCEDURE: Informed consent was obtained from the patient. The breast was cleansed with  ChloraPrep. Lidocaine was used for local anesthesia. A -gauge needle was then advanced to the area of abnormality. A localization wire was then deployed. Post-procedure mammograms demonstrate the localization wire in appropriate position. The previously placed marker was visualized. The patient tolerated this well. IMPRESSION: Sonographically guided left wire localization. A specimen was sent for radiography. Specimen radiograph demonstrates the area of abnormality and the localization wire to be included in the specimen INDICATION: Breast Carcinoma. Pre-operative identification of the left sentinel lymph node. PROCEDURE: Informed consent was obtained from the patient. The skin was prepped with ChloraPrep.  488uCi Tc99m Lymphoseek was injected subdermally along the upper outer aspect of the areolar margin. The patient tolerated this well. IMPRESSION: [place] lymph node injection     US Breast Wire Placement 1st Lesion Left    Result Date: 9/7/2021  INDICATION: Pre-operative localization of invasive ductal carcinoma at 9 o'clock, 7 cm from the nipple. PROCEDURE: Informed consent was obtained from the patient. The breast was cleansed with ChloraPrep. Lidocaine was used for local anesthesia. A -gauge needle was then advanced to the area of abnormality. A localization wire was then deployed. Post-procedure mammograms demonstrate the localization wire in appropriate position. The previously placed marker was visualized. The patient tolerated this well. IMPRESSION: Sonographically guided left wire localization. A specimen was sent for radiography. Specimen radiograph demonstrates the area of abnormality and the localization wire to be included in the specimen INDICATION: Breast Carcinoma. Pre-operative identification of the left sentinel lymph node. PROCEDURE: Informed consent was obtained from the patient. The skin was prepped with ChloraPrep.  488uCi Tc99m Lymphoseek was injected subdermally along the upper outer  aspect of the areolar margin. The patient tolerated this well. IMPRESSION: [place] lymph node injection     MA Breast Specimen Left    Result Date: 9/7/2021  INDICATION: Pre-operative localization of invasive ductal carcinoma at 9 o'clock, 7 cm from the nipple. PROCEDURE: Informed consent was obtained from the patient. The breast was cleansed with ChloraPrep. Lidocaine was used for local anesthesia. A -gauge needle was then advanced to the area of abnormality. A localization wire was then deployed. Post-procedure mammograms demonstrate the localization wire in appropriate position. The previously placed marker was visualized. The patient tolerated this well. IMPRESSION: Sonographically guided left wire localization. A specimen was sent for radiography. Specimen radiograph demonstrates the area of abnormality and the localization wire to be included in the specimen INDICATION: Breast Carcinoma. Pre-operative identification of the left sentinel lymph node. PROCEDURE: Informed consent was obtained from the patient. The skin was prepped with ChloraPrep.  488uCi Tc99m Lymphoseek was injected subdermally along the upper outer aspect of the areolar margin. The patient tolerated this well. IMPRESSION: [place] lymph node injection       Signed by: Tommy Caputo MD

## 2021-10-05 ENCOUNTER — DOCUMENTATION ONLY (OUTPATIENT)
Dept: ONCOLOGY | Facility: CLINIC | Age: 76
End: 2021-10-05

## 2021-10-05 NOTE — PROGRESS NOTES
Per request of Dr. Caputo, order for oncotype was submitted through [email or web address] online portal/Ivonne Medley RN

## 2021-10-12 ENCOUNTER — DOCUMENTATION ONLY (OUTPATIENT)
Dept: ONCOLOGY | Facility: CLINIC | Age: 76
End: 2021-10-12

## 2021-10-12 NOTE — PROGRESS NOTES
Oncotype results have been printed from website, given to Dr Caputo and a copy sent to HIM to be scanned into her chart.    Kinza Carter RN

## 2021-10-14 ENCOUNTER — MEDICAL CORRESPONDENCE (OUTPATIENT)
Dept: HEALTH INFORMATION MANAGEMENT | Facility: CLINIC | Age: 76
End: 2021-10-14

## 2021-10-14 ENCOUNTER — IMMUNIZATION (OUTPATIENT)
Dept: FAMILY MEDICINE | Facility: CLINIC | Age: 76
End: 2021-10-14
Payer: COMMERCIAL

## 2021-10-14 PROCEDURE — 90662 IIV NO PRSV INCREASED AG IM: CPT

## 2021-10-14 PROCEDURE — G0008 ADMIN INFLUENZA VIRUS VAC: HCPCS

## 2021-10-18 ENCOUNTER — TELEPHONE (OUTPATIENT)
Dept: ONCOLOGY | Facility: CLINIC | Age: 76
End: 2021-10-18

## 2021-10-18 NOTE — TELEPHONE ENCOUNTER
Called patient to let her know Dr. Caputo would like to have her in the office for a visit it discuss Oncotype score. She will be out of town for a few weeks. She would like to come in on 11/15/21 at 2:30. Will notify scheduling. Rakel Prieto RN

## 2021-11-15 ENCOUNTER — ONCOLOGY VISIT (OUTPATIENT)
Dept: ONCOLOGY | Facility: CLINIC | Age: 76
End: 2021-11-15
Attending: INTERNAL MEDICINE
Payer: COMMERCIAL

## 2021-11-15 VITALS
BODY MASS INDEX: 30.84 KG/M2 | SYSTOLIC BLOOD PRESSURE: 136 MMHG | OXYGEN SATURATION: 96 % | RESPIRATION RATE: 16 BRPM | HEART RATE: 80 BPM | WEIGHT: 153 LBS | HEIGHT: 59 IN | DIASTOLIC BLOOD PRESSURE: 63 MMHG

## 2021-11-15 DIAGNOSIS — C50.912 INVASIVE DUCTAL CARCINOMA OF BREAST, LEFT (H): Primary | ICD-10-CM

## 2021-11-15 PROCEDURE — 99214 OFFICE O/P EST MOD 30 MIN: CPT | Performed by: INTERNAL MEDICINE

## 2021-11-15 PROCEDURE — G0463 HOSPITAL OUTPT CLINIC VISIT: HCPCS

## 2021-11-15 ASSESSMENT — MIFFLIN-ST. JEOR: SCORE: 1089.63

## 2021-11-15 ASSESSMENT — PAIN SCALES - GENERAL: PAINLEVEL: NO PAIN (0)

## 2021-11-15 NOTE — PROGRESS NOTES
"Oncology Rooming Note    November 15, 2021 2:42 PM   Ariana Hodge is a 76 year old female who presents for:    Chief Complaint   Patient presents with     Oncology Clinic Visit     Breast Cancer     Initial Vitals: /63 (BP Location: Left arm, Cuff Size: Adult Regular)   Pulse 80   Resp 16   Ht 1.499 m (4' 11\")   Wt 69.4 kg (153 lb)   SpO2 96%   BMI 30.90 kg/m   Estimated body mass index is 30.9 kg/m  as calculated from the following:    Height as of this encounter: 1.499 m (4' 11\").    Weight as of this encounter: 69.4 kg (153 lb). Body surface area is 1.7 meters squared.  No Pain (0) Comment: Data Unavailable   No LMP recorded. Patient is postmenopausal.  Allergies reviewed: Yes  Medications reviewed: Yes    Medications: Medication refills not needed today.  Pharmacy name entered into [id number] [place], [place] - [address]    Clinical concerns: medication follow up    Iris Obrien            "

## 2021-11-15 NOTE — LETTER
"    11/15/2021         RE: Ariana Hodge  [address] [place] [address]        Dear Colleague,    Thank you for referring your patient, Ariana Hodge, to the [facility]. Please see a copy of my visit note below.    Oncology Rooming Note    November 15, 2021 2:42 PM   Ariana Hodge is a 76 year old female who presents for:    Chief Complaint   Patient presents with     Oncology Clinic Visit     Breast Cancer     Initial Vitals: /63 (BP Location: Left arm, Cuff Size: Adult Regular)   Pulse 80   Resp 16   Ht 1.499 m (4' 11\")   Wt 69.4 kg (153 lb)   SpO2 96%   BMI 30.90 kg/m   Estimated body mass index is 30.9 kg/m  as calculated from the following:    Height as of this encounter: 1.499 m (4' 11\").    Weight as of this encounter: 69.4 kg (153 lb). Body surface area is 1.7 meters squared.  No Pain (0) Comment: Data Unavailable   No LMP recorded. Patient is postmenopausal.  Allergies reviewed: Yes  Medications reviewed: Yes    Medications: Medication refills not needed today.  Pharmacy name entered into [id number] - [place], [place] - [address]    Clinical concerns: medication follow up    Iris Obrien              [facility] Hematology and Oncology Progress Note    Patient: Ariana Hodge  MRN: [id number]  Date of Service: Nov 15, 2021        Assessment and Plan:    1. Invasive ductal carcinoma: She has been on Arimidex for about 6 weeks. Seems to be tolerating it well. She is taking calcium and vitamin D supplementation. We spent the majority of the time today discussing her Oncotype score. Based on the score alone chemotherapy could be recommended. I reviewed the therapy recurrence risk of approximately 16% with 5 years of Arimidex. I told her with chemotherapy we could expect a relative risk reduction of recurrence of an additional 30%. After some discussion, she decided she would like to forego any chemotherapy. I told her that I " think that is a completely reasonable decision. She will continue with the Arimidex. We will plan on seeing her back in about 3 months. Next mammogram will be due in July 2022.    ECOG Performance  0    Diagnosis:    1. Invasive ductal carcinoma of the left breast: Diagnosed August 2021. Upper inner quadrant. Grade 2, ER/RI positive, HER-2/josh negative. Tumor size was 15 x 13 x 8 mm. 2 sentinel lymph nodes were negative.  Oncotype recurrence score is 27 (16%).    Treatment:    Breast conservation surgery performed on September 7, 2021.  Arimidex started mid October 2021.    Interim History:    Ariana returns today for follow-up visit. She has been on Arimidex for about a month. She is tolerating it well. No major problems with vasomotor symptoms or myalgias. She is here to review the results of her Oncotype testing.    Review of Systems:    As above in the history.     Review of Systems otherwise Negative for:  General: chills, fever or night sweats  Psychological: anxiety or depression  Ophthalmic: blurry vision, double vision or loss of vision, vision change  ENT: epistaxis, oral lesions, hearing changes  Hematological and Lymphatic: bleeding, bruising, jaundice, swollen lymph nodes  Endocrine: hot flashes, unexpected weight changes  Respiratory: cough, hemoptysis, orthopnea or shortness of breath/CHU  Cardiovascular: chest pain, edema, palpitations or PND  Gastrointestinal: abdominal pain, blood in stools, change in bowel habits, constipation, diarrhea or nausea/vomiting  Genito-Urinary: change in urinary stream, incontinence, frequency/urgency  Musculoskeletal: joint pain, stiffness, swelling, muscle pain  Neurological: dizziness, headaches, numbness/tingling  Dermatological: lumps and rash    Past History:    Past Medical History:   Diagnosis Date     CHF (congestive heart failure) (H) 2/26/2020     Gastroesophageal reflux disease      Hypertension      Inverted nipple     both breasts ever since she had her  "breast reduction surgery     Reflux esophagitis      Skin cancer      Physical Exam:    /63 (BP Location: Left arm, Cuff Size: Adult Regular)   Pulse 80   Resp 16   Ht 1.499 m (4' 11\")   Wt 69.4 kg (153 lb)   SpO2 96%   BMI 30.90 kg/m      General: patient appears stated age of 76 year old. Nontoxic and in no distress.   HEENT: Head: atraumatic, normocephalic. Sclerae anicteric.  Chest:  Normal respiratory effort  Cardiac:  No edema.   Abdomen: abdomen is non-distended  Extremities: normal tone and muscle bulk.  Skin: no lesions or rash on visible skin. Warm and dry.   CNS: alert and oriented. Grossly non-focal.   Psychiatric: normal mood and affect.     Lab Results:    No results found for this or any previous visit (from the past 168 hour(s)).     Imaging:    No results found.      Signed by: Tommy Caputo MD          Again, thank you for allowing me to participate in the care of your patient.        Sincerely,        Tommy Caputo MD    "

## 2021-12-22 NOTE — PROGRESS NOTES
[facility] Hematology and Oncology Progress Note    Patient: Ariana Hodge  MRN: [id number]  Date of Service: Nov 15, 2021        Assessment and Plan:    1. Invasive ductal carcinoma: She has been on Arimidex for about 6 weeks. Seems to be tolerating it well. She is taking calcium and vitamin D supplementation. We spent the majority of the time today discussing her Oncotype score. Based on the score alone chemotherapy could be recommended. I reviewed the therapy recurrence risk of approximately 16% with 5 years of Arimidex. I told her with chemotherapy we could expect a relative risk reduction of recurrence of an additional 30%. After some discussion, she decided she would like to forego any chemotherapy. I told her that I think that is a completely reasonable decision. She will continue with the Arimidex. We will plan on seeing her back in about 3 months. Next mammogram will be due in July 2022.    ECOG Performance  0    Diagnosis:    1. Invasive ductal carcinoma of the left breast: Diagnosed August 2021. Upper inner quadrant. Grade 2, ER/AZ positive, HER-2/josh negative. Tumor size was 15 x 13 x 8 mm. 2 sentinel lymph nodes were negative.  Oncotype recurrence score is 27 (16%).    Treatment:    Breast conservation surgery performed on September 7, 2021.  Arimidex started mid October 2021.    Interim History:    Ariana returns today for follow-up visit. She has been on Arimidex for about a month. She is tolerating it well. No major problems with vasomotor symptoms or myalgias. She is here to review the results of her Oncotype testing.    Review of Systems:    As above in the history.     Review of Systems otherwise Negative for:  General: chills, fever or night sweats  Psychological: anxiety or depression  Ophthalmic: blurry vision, double vision or loss of vision, vision change  ENT: epistaxis, oral lesions, hearing changes  Hematological and Lymphatic: bleeding, bruising, jaundice, swollen lymph  "nodes  Endocrine: hot flashes, unexpected weight changes  Respiratory: cough, hemoptysis, orthopnea or shortness of breath/CHU  Cardiovascular: chest pain, edema, palpitations or PND  Gastrointestinal: abdominal pain, blood in stools, change in bowel habits, constipation, diarrhea or nausea/vomiting  Genito-Urinary: change in urinary stream, incontinence, frequency/urgency  Musculoskeletal: joint pain, stiffness, swelling, muscle pain  Neurological: dizziness, headaches, numbness/tingling  Dermatological: lumps and rash    Past History:    Past Medical History:   Diagnosis Date     CHF (congestive heart failure) (H) 2/26/2020     Gastroesophageal reflux disease      Hypertension      Inverted nipple     both breasts ever since she had her breast reduction surgery     Reflux esophagitis      Skin cancer      Physical Exam:    /63 (BP Location: Left arm, Cuff Size: Adult Regular)   Pulse 80   Resp 16   Ht 1.499 m (4' 11\")   Wt 69.4 kg (153 lb)   SpO2 96%   BMI 30.90 kg/m      General: patient appears stated age of 76 year old. Nontoxic and in no distress.   HEENT: Head: atraumatic, normocephalic. Sclerae anicteric.  Chest:  Normal respiratory effort  Cardiac:  No edema.   Abdomen: abdomen is non-distended  Extremities: normal tone and muscle bulk.  Skin: no lesions or rash on visible skin. Warm and dry.   CNS: alert and oriented. Grossly non-focal.   Psychiatric: normal mood and affect.     Lab Results:    No results found for this or any previous visit (from the past 168 hour(s)).     Imaging:    No results found.      Signed by: Tommy Caputo MD      "

## 2022-02-14 ENCOUNTER — LAB (OUTPATIENT)
Dept: INFUSION THERAPY | Facility: CLINIC | Age: 77
End: 2022-02-14
Attending: INTERNAL MEDICINE
Payer: COMMERCIAL

## 2022-02-14 ENCOUNTER — ONCOLOGY VISIT (OUTPATIENT)
Dept: ONCOLOGY | Facility: CLINIC | Age: 77
End: 2022-02-14
Attending: INTERNAL MEDICINE
Payer: COMMERCIAL

## 2022-02-14 VITALS
HEART RATE: 78 BPM | BODY MASS INDEX: 30.96 KG/M2 | RESPIRATION RATE: 16 BRPM | WEIGHT: 153.3 LBS | TEMPERATURE: 98.7 F | DIASTOLIC BLOOD PRESSURE: 80 MMHG | SYSTOLIC BLOOD PRESSURE: 140 MMHG | OXYGEN SATURATION: 96 %

## 2022-02-14 DIAGNOSIS — C50.912 INVASIVE DUCTAL CARCINOMA OF BREAST, STAGE 1, LEFT (H): ICD-10-CM

## 2022-02-14 DIAGNOSIS — C50.912 INVASIVE DUCTAL CARCINOMA OF BREAST, LEFT (H): Primary | ICD-10-CM

## 2022-02-14 LAB
ALBUMIN SERPL-MCNC: 4.3 G/DL (ref 3.5–5)
ALP SERPL-CCNC: 67 U/L (ref 45–120)
ALT SERPL W P-5'-P-CCNC: 27 U/L (ref 0–45)
ANION GAP SERPL CALCULATED.3IONS-SCNC: 14 MMOL/L (ref 5–18)
AST SERPL W P-5'-P-CCNC: 26 U/L (ref 0–40)
BASOPHILS # BLD AUTO: 0 10E3/UL (ref 0–0.2)
BASOPHILS NFR BLD AUTO: 1 %
BILIRUB SERPL-MCNC: 0.8 MG/DL (ref 0–1)
BUN SERPL-MCNC: 17 MG/DL (ref 8–28)
CALCIUM SERPL-MCNC: 10.1 MG/DL (ref 8.5–10.5)
CHLORIDE BLD-SCNC: 100 MMOL/L (ref 98–107)
CO2 SERPL-SCNC: 27 MMOL/L (ref 22–31)
CREAT SERPL-MCNC: 0.85 MG/DL (ref 0.6–1.1)
EOSINOPHIL # BLD AUTO: 0.1 10E3/UL (ref 0–0.7)
EOSINOPHIL NFR BLD AUTO: 2 %
ERYTHROCYTE [DISTWIDTH] IN BLOOD BY AUTOMATED COUNT: 13.6 % (ref 10–15)
GFR SERPL CREATININE-BSD FRML MDRD: 71 ML/MIN/1.73M2
GLUCOSE BLD-MCNC: 89 MG/DL (ref 70–125)
HCT VFR BLD AUTO: 43.7 % (ref 35–47)
HGB BLD-MCNC: 15 G/DL (ref 11.7–15.7)
IMM GRANULOCYTES # BLD: 0 10E3/UL
IMM GRANULOCYTES NFR BLD: 0 %
LYMPHOCYTES # BLD AUTO: 1.9 10E3/UL (ref 0.8–5.3)
LYMPHOCYTES NFR BLD AUTO: 32 %
MCH RBC QN AUTO: 32.1 PG (ref 26.5–33)
MCHC RBC AUTO-ENTMCNC: 34.3 G/DL (ref 31.5–36.5)
MCV RBC AUTO: 94 FL (ref 78–100)
MONOCYTES # BLD AUTO: 0.4 10E3/UL (ref 0–1.3)
MONOCYTES NFR BLD AUTO: 7 %
NEUTROPHILS # BLD AUTO: 3.5 10E3/UL (ref 1.6–8.3)
NEUTROPHILS NFR BLD AUTO: 58 %
NRBC # BLD AUTO: 0 10E3/UL
NRBC BLD AUTO-RTO: 0 /100
PLATELET # BLD AUTO: 264 10E3/UL (ref 150–450)
POTASSIUM BLD-SCNC: 4.1 MMOL/L (ref 3.5–5)
PROT SERPL-MCNC: 7.8 G/DL (ref 6–8)
RBC # BLD AUTO: 4.67 10E6/UL (ref 3.8–5.2)
SODIUM SERPL-SCNC: 141 MMOL/L (ref 136–145)
WBC # BLD AUTO: 5.9 10E3/UL (ref 4–11)

## 2022-02-14 PROCEDURE — G0463 HOSPITAL OUTPT CLINIC VISIT: HCPCS

## 2022-02-14 PROCEDURE — 99214 OFFICE O/P EST MOD 30 MIN: CPT | Performed by: INTERNAL MEDICINE

## 2022-02-14 PROCEDURE — 85004 AUTOMATED DIFF WBC COUNT: CPT

## 2022-02-14 PROCEDURE — 80053 COMPREHEN METABOLIC PANEL: CPT

## 2022-02-14 PROCEDURE — 36415 COLL VENOUS BLD VENIPUNCTURE: CPT

## 2022-02-14 ASSESSMENT — PAIN SCALES - GENERAL: PAINLEVEL: NO PAIN (0)

## 2022-02-14 NOTE — LETTER
"    2/14/2022         RE: Ariana Hodge  [address] [place] [address]        Dear Colleague,    Thank you for referring your patient, Ariana Hodge, to the [facility]. Please see a copy of my visit note below.    Oncology Rooming Note    February 14, 2022 3:17 PM   Ariana Hodge is a 76 year old female who presents for:    Chief Complaint   Patient presents with     Breast Cancer     Initial Vitals: BP (!) 140/80 (Patient Position: Sitting)   Pulse 78   Temp 98.7  F (37.1  C) (Oral)   Resp 16   Wt 69.5 kg (153 lb 4.8 oz)   SpO2 96%   BMI 30.96 kg/m   Estimated body mass index is 30.96 kg/m  as calculated from the following:    Height as of 11/15/21: 1.499 m (4' 11\").    Weight as of this encounter: 69.5 kg (153 lb 4.8 oz). Body surface area is 1.7 meters squared.  No Pain (0) Comment: Data Unavailable   No LMP recorded. Patient is postmenopausal.  Allergies reviewed: Yes  Medications reviewed: Yes    Medications: Medication refills not needed today.  Pharmacy name entered into [employer]: [id number] - [place], [place] - [address]    Clinical concerns Follow up      Daija Leonardo LPN                [facility] Hematology and Oncology Progress Note    Patient: Ariana Hodge  MRN: [id number]  Date of Service: Feb 14, 2022        Assessment and Plan:    1. Invasive ductal carcinoma: Continues on Arimidex and is generally doing well.  Minimal side effects.  No other signs or symptoms of recurrent disease.  I reviewed her CBC and her blood counts are all normal.  Chemistries were reviewed and her kidney function and calcium are normal.  She will continue supplemental calcium and vitamin D.  We will have her return to clinic in 6 months.  We are going to set up a bone density scan in the near future.  She will get a mammogram in 6 months prior to her follow-up.    ECOG Performance  0    Diagnosis:    1. Invasive ductal carcinoma of the left breast: Diagnosed " August 2021. Upper inner quadrant. Grade 2, ER/[place] positive, HER-2/josh negative. Tumor size was 15 x 13 x 8 mm. 2 sentinel lymph nodes were negative.  Oncotype recurrence score is 27 (16%).    Treatment:    Breast conservation surgery performed on September 7, 2021.  Arimidex started mid October 2021.    Interim History:    Ariana returns today for follow-up visit.  She was seen about 3 months ago.  She continues on Arimidex.  Seems to be tolerating it okay.  No myalgias or vasomotor symptoms.  No other complaints today.      Review of Systems:    As above in the history.     Review of Systems otherwise Negative for:  General: chills, fever or night sweats  Psychological: anxiety or depression  Ophthalmic: blurry vision, double vision or loss of vision, vision change  ENT: epistaxis, oral lesions, hearing changes  Hematological and Lymphatic: bleeding, bruising, jaundice, swollen lymph nodes  Endocrine: hot flashes, unexpected weight changes  Respiratory: cough, hemoptysis, orthopnea or shortness of breath/CHU  Cardiovascular: chest pain, edema, palpitations or PND  Gastrointestinal: abdominal pain, blood in stools, change in bowel habits, constipation, diarrhea or nausea/vomiting  Genito-Urinary: change in urinary stream, incontinence, frequency/urgency  Musculoskeletal: joint pain, stiffness, swelling, muscle pain  Neurological: dizziness, headaches, numbness/tingling  Dermatological: lumps and rash    Past History:    Past Medical History:   Diagnosis Date     CHF (congestive heart failure) (H) 2/26/2020     Gastroesophageal reflux disease      Hypertension      Inverted nipple     both breasts ever since she had her breast reduction surgery     Reflux esophagitis      Skin cancer      Physical Exam:    BP (!) 140/80 (Patient Position: Sitting)   Pulse 78   Temp 98.7  F (37.1  C) (Oral)   Resp 16   Wt 69.5 kg (153 lb 4.8 oz)   SpO2 96%   BMI 30.96 kg/m      General: patient appears stated age of 76 year old.  Nontoxic and in no distress.   HEENT: Head: atraumatic, normocephalic. Sclerae anicteric.  Chest:  Normal respiratory effort  Cardiac:  No edema.   Abdomen: abdomen is non-distended  Extremities: normal tone and muscle bulk.  Skin: no lesions or rash on visible skin. Warm and dry.   CNS: alert and oriented. Grossly non-focal.   Psychiatric: normal mood and affect.   Breast: Normal breast examination bilaterally.  No skin changes or palpable masses.  Inverted nipples bilaterally.    Lab Results:    Recent Results (from the past 168 hour(s))   Comprehensive metabolic panel   Result Value Ref Range    Sodium 141 136 - 145 mmol/L    Potassium 4.1 3.5 - 5.0 mmol/L    Chloride 100 98 - 107 mmol/L    Carbon Dioxide (CO2) 27 22 - 31 mmol/L    Anion Gap 14 5 - 18 mmol/L    Urea Nitrogen 17 8 - 28 mg/dL    Creatinine 0.85 0.60 - 1.10 mg/dL    Calcium 10.1 8.5 - 10.5 mg/dL    Glucose 89 70 - 125 mg/dL    Alkaline Phosphatase 67 45 - 120 U/L    AST 26 0 - 40 U/L    ALT 27 0 - 45 U/L    Protein Total 7.8 6.0 - 8.0 g/dL    Albumin 4.3 3.5 - 5.0 g/dL    Bilirubin Total 0.8 0.0 - 1.0 mg/dL    GFR Estimate 71 >60 mL/min/1.73m2   CBC with platelets and differential   Result Value Ref Range    WBC Count 5.9 4.0 - 11.0 10e3/uL    RBC Count 4.67 3.80 - 5.20 10e6/uL    Hemoglobin 15.0 11.7 - 15.7 g/dL    Hematocrit 43.7 35.0 - 47.0 %    MCV 94 78 - 100 fL    MCH 32.1 26.5 - 33.0 pg    MCHC 34.3 31.5 - 36.5 g/dL    RDW 13.6 10.0 - 15.0 %    Platelet Count 264 150 - 450 10e3/uL    % Neutrophils 58 %    % Lymphocytes 32 %    % Monocytes 7 %    % Eosinophils 2 %    % Basophils 1 %    % Immature Granulocytes 0 %    NRBCs per 100 WBC 0 <1 /100    Absolute Neutrophils 3.5 1.6 - 8.3 10e3/uL    Absolute Lymphocytes 1.9 0.8 - 5.3 10e3/uL    Absolute Monocytes 0.4 0.0 - 1.3 10e3/uL    Absolute Eosinophils 0.1 0.0 - 0.7 10e3/uL    Absolute Basophils 0.0 0.0 - 0.2 10e3/uL    Absolute Immature Granulocytes 0.0 <=0.4 10e3/uL    Absolute NRBCs 0.0  10e3/uL        Signed by: Tommy Caputo MD          Again, thank you for allowing me to participate in the care of your patient.        Sincerely,        Tommy Caputo MD

## 2022-02-14 NOTE — PROGRESS NOTES
[facility] Hematology and Oncology Progress Note    Patient: Ariana Hodge  MRN: [id number]  Date of Service: Feb 14, 2022        Assessment and Plan:    1. Invasive ductal carcinoma: Continues on Arimidex and is generally doing well.  Minimal side effects.  No other signs or symptoms of recurrent disease.  I reviewed her CBC and her blood counts are all normal.  Chemistries were reviewed and her kidney function and calcium are normal.  She will continue supplemental calcium and vitamin D.  We will have her return to clinic in 6 months.  We are going to set up a bone density scan in the near future.  She will get a mammogram in 6 months prior to her follow-up.    ECOG Performance  0    Diagnosis:    1. Invasive ductal carcinoma of the left breast: Diagnosed August 2021. Upper inner quadrant. Grade 2, ER/[place] positive, HER-2/josh negative. Tumor size was 15 x 13 x 8 mm. 2 sentinel lymph nodes were negative.  Oncotype recurrence score is 27 (16%).    Treatment:    Breast conservation surgery performed on September 7, 2021.  Arimidex started mid October 2021.    Interim History:    Ariana returns today for follow-up visit.  She was seen about 3 months ago.  She continues on Arimidex.  Seems to be tolerating it okay.  No myalgias or vasomotor symptoms.  No other complaints today.      Review of Systems:    As above in the history.     Review of Systems otherwise Negative for:  General: chills, fever or night sweats  Psychological: anxiety or depression  Ophthalmic: blurry vision, double vision or loss of vision, vision change  ENT: epistaxis, oral lesions, hearing changes  Hematological and Lymphatic: bleeding, bruising, jaundice, swollen lymph nodes  Endocrine: hot flashes, unexpected weight changes  Respiratory: cough, hemoptysis, orthopnea or shortness of breath/CHU  Cardiovascular: chest pain, edema, palpitations or PND  Gastrointestinal: abdominal pain, blood in stools, change in bowel habits, constipation,  diarrhea or nausea/vomiting  Genito-Urinary: change in urinary stream, incontinence, frequency/urgency  Musculoskeletal: joint pain, stiffness, swelling, muscle pain  Neurological: dizziness, headaches, numbness/tingling  Dermatological: lumps and rash    Past History:    Past Medical History:   Diagnosis Date     CHF (congestive heart failure) (H) 2/26/2020     Gastroesophageal reflux disease      Hypertension      Inverted nipple     both breasts ever since she had her breast reduction surgery     Reflux esophagitis      Skin cancer      Physical Exam:    BP (!) 140/80 (Patient Position: Sitting)   Pulse 78   Temp 98.7  F (37.1  C) (Oral)   Resp 16   Wt 69.5 kg (153 lb 4.8 oz)   SpO2 96%   BMI 30.96 kg/m      General: patient appears stated age of 76 year old. Nontoxic and in no distress.   HEENT: Head: atraumatic, normocephalic. Sclerae anicteric.  Chest:  Normal respiratory effort  Cardiac:  No edema.   Abdomen: abdomen is non-distended  Extremities: normal tone and muscle bulk.  Skin: no lesions or rash on visible skin. Warm and dry.   CNS: alert and oriented. Grossly non-focal.   Psychiatric: normal mood and affect.   Breast: Normal breast examination bilaterally.  No skin changes or palpable masses.  Inverted nipples bilaterally.    Lab Results:    Recent Results (from the past 168 hour(s))   Comprehensive metabolic panel   Result Value Ref Range    Sodium 141 136 - 145 mmol/L    Potassium 4.1 3.5 - 5.0 mmol/L    Chloride 100 98 - 107 mmol/L    Carbon Dioxide (CO2) 27 22 - 31 mmol/L    Anion Gap 14 5 - 18 mmol/L    Urea Nitrogen 17 8 - 28 mg/dL    Creatinine 0.85 0.60 - 1.10 mg/dL    Calcium 10.1 8.5 - 10.5 mg/dL    Glucose 89 70 - 125 mg/dL    Alkaline Phosphatase 67 45 - 120 U/L    AST 26 0 - 40 U/L    ALT 27 0 - 45 U/L    Protein Total 7.8 6.0 - 8.0 g/dL    Albumin 4.3 3.5 - 5.0 g/dL    Bilirubin Total 0.8 0.0 - 1.0 mg/dL    GFR Estimate 71 >60 mL/min/1.73m2   CBC with platelets and differential    Result Value Ref Range    WBC Count 5.9 4.0 - 11.0 10e3/uL    RBC Count 4.67 3.80 - 5.20 10e6/uL    Hemoglobin 15.0 11.7 - 15.7 g/dL    Hematocrit 43.7 35.0 - 47.0 %    MCV 94 78 - 100 fL    MCH 32.1 26.5 - 33.0 pg    MCHC 34.3 31.5 - 36.5 g/dL    RDW 13.6 10.0 - 15.0 %    Platelet Count 264 150 - 450 10e3/uL    % Neutrophils 58 %    % Lymphocytes 32 %    % Monocytes 7 %    % Eosinophils 2 %    % Basophils 1 %    % Immature Granulocytes 0 %    NRBCs per 100 WBC 0 <1 /100    Absolute Neutrophils 3.5 1.6 - 8.3 10e3/uL    Absolute Lymphocytes 1.9 0.8 - 5.3 10e3/uL    Absolute Monocytes 0.4 0.0 - 1.3 10e3/uL    Absolute Eosinophils 0.1 0.0 - 0.7 10e3/uL    Absolute Basophils 0.0 0.0 - 0.2 10e3/uL    Absolute Immature Granulocytes 0.0 <=0.4 10e3/uL    Absolute NRBCs 0.0 10e3/uL        Signed by: Tommy Caputo MD

## 2022-02-14 NOTE — PROGRESS NOTES
"Oncology Rooming Note    February 14, 2022 3:17 PM   Ariana Hodge is a 76 year old female who presents for:    Chief Complaint   Patient presents with     Breast Cancer     Initial Vitals: BP (!) 140/80 (Patient Position: Sitting)   Pulse 78   Temp 98.7  F (37.1  C) (Oral)   Resp 16   Wt 69.5 kg (153 lb 4.8 oz)   SpO2 96%   BMI 30.96 kg/m   Estimated body mass index is 30.96 kg/m  as calculated from the following:    Height as of 11/15/21: 1.499 m (4' 11\").    Weight as of this encounter: 69.5 kg (153 lb 4.8 oz). Body surface area is 1.7 meters squared.  No Pain (0) Comment: Data Unavailable   No LMP recorded. Patient is postmenopausal.  Allergies reviewed: Yes  Medications reviewed: Yes    Medications: Medication refills not needed today.  Pharmacy name entered into [employer]: [id number] [place], [place] - [address]    Clinical concerns Follow up      Daija Leonardo LPN              "

## 2022-03-28 DIAGNOSIS — M81.0 SENILE OSTEOPOROSIS: Primary | ICD-10-CM

## 2022-04-04 ENCOUNTER — DOCUMENTATION ONLY (OUTPATIENT)
Dept: LAB | Facility: CLINIC | Age: 77
End: 2022-04-04

## 2022-04-04 ENCOUNTER — ANCILLARY PROCEDURE (OUTPATIENT)
Dept: BONE DENSITY | Facility: CLINIC | Age: 77
End: 2022-04-04
Attending: INTERNAL MEDICINE
Payer: COMMERCIAL

## 2022-04-04 DIAGNOSIS — M81.0 SENILE OSTEOPOROSIS: ICD-10-CM

## 2022-04-04 PROCEDURE — 77080 DXA BONE DENSITY AXIAL: CPT | Mod: TC | Performed by: RADIOLOGY

## 2022-04-28 ENCOUNTER — TRANSFERRED RECORDS (OUTPATIENT)
Dept: HEALTH INFORMATION MANAGEMENT | Facility: CLINIC | Age: 77
End: 2022-04-28
Payer: COMMERCIAL

## 2022-05-05 DIAGNOSIS — E78.5 DYSLIPIDEMIA: ICD-10-CM

## 2022-05-05 DIAGNOSIS — F32.5 MAJOR DEPRESSION IN REMISSION (H): ICD-10-CM

## 2022-05-05 DIAGNOSIS — I10 ESSENTIAL HYPERTENSION: Primary | ICD-10-CM

## 2022-05-10 RX ORDER — ATORVASTATIN CALCIUM 20 MG/1
TABLET, FILM COATED ORAL
Qty: 90 TABLET | Refills: 0 | Status: SHIPPED | OUTPATIENT
Start: 2022-05-10 | End: 2022-08-08

## 2022-05-10 RX ORDER — LOSARTAN POTASSIUM 25 MG/1
TABLET ORAL
Qty: 90 TABLET | Refills: 0 | Status: SHIPPED | OUTPATIENT
Start: 2022-05-10 | End: 2022-10-14

## 2022-05-10 RX ORDER — CITALOPRAM HYDROBROMIDE 20 MG/1
TABLET ORAL
Qty: 90 TABLET | Refills: 0 | Status: SHIPPED | OUTPATIENT
Start: 2022-05-10 | End: 2022-08-05

## 2022-05-10 RX ORDER — HYDROCHLOROTHIAZIDE 25 MG/1
TABLET ORAL
Qty: 90 TABLET | Refills: 0 | Status: SHIPPED | OUTPATIENT
Start: 2022-05-10 | End: 2022-08-05

## 2022-06-06 ENCOUNTER — OFFICE VISIT (OUTPATIENT)
Dept: FAMILY MEDICINE | Facility: CLINIC | Age: 77
End: 2022-06-06
Payer: COMMERCIAL

## 2022-06-06 VITALS
DIASTOLIC BLOOD PRESSURE: 56 MMHG | OXYGEN SATURATION: 97 % | SYSTOLIC BLOOD PRESSURE: 120 MMHG | HEIGHT: 59 IN | HEART RATE: 79 BPM | WEIGHT: 152.13 LBS | BODY MASS INDEX: 30.67 KG/M2

## 2022-06-06 DIAGNOSIS — I10 PRIMARY HYPERTENSION: ICD-10-CM

## 2022-06-06 DIAGNOSIS — C50.912 INVASIVE DUCTAL CARCINOMA OF BREAST, LEFT (H): ICD-10-CM

## 2022-06-06 DIAGNOSIS — H25.9 AGE-RELATED CATARACT OF BOTH EYES, UNSPECIFIED AGE-RELATED CATARACT TYPE: ICD-10-CM

## 2022-06-06 DIAGNOSIS — Z01.818 PREOP GENERAL PHYSICAL EXAM: Primary | ICD-10-CM

## 2022-06-06 DIAGNOSIS — F32.5 MAJOR DEPRESSION IN REMISSION (H): ICD-10-CM

## 2022-06-06 DIAGNOSIS — E78.5 DYSLIPIDEMIA: ICD-10-CM

## 2022-06-06 PROCEDURE — 99214 OFFICE O/P EST MOD 30 MIN: CPT | Performed by: FAMILY MEDICINE

## 2022-06-06 ASSESSMENT — PATIENT HEALTH QUESTIONNAIRE - PHQ9
10. IF YOU CHECKED OFF ANY PROBLEMS, HOW DIFFICULT HAVE THESE PROBLEMS MADE IT FOR YOU TO DO YOUR WORK, TAKE CARE OF THINGS AT HOME, OR GET ALONG WITH OTHER PEOPLE: NOT DIFFICULT AT ALL
SUM OF ALL RESPONSES TO PHQ QUESTIONS 1-9: 0
SUM OF ALL RESPONSES TO PHQ QUESTIONS 1-9: 0

## 2022-06-06 NOTE — PROGRESS NOTES
[facility]  [address] [address]  Phone: [phone]  Fax: [phone]  Primary Provider: Isabella Rodriguez  Pre-op Performing Provider: ISABELLA RODRIGUEZ      PREOPERATIVE EVALUATION:  Today's date: 6/6/2022    Ariana Hodge is a 76 year old female who presents for a preoperative evaluation.    Surgical Information:  Surgery/Procedure: Cataract surgery  Surgery Location: [facility] - [place]  Surgeon:   Surgery Date: 06/27/22   Time of Surgery: unknown at this time  Where patient plans to recover: At home with family  Fax number for surgical facility: [phone]    Type of Anesthesia Anticipated: to be determined    Assessment & Plan     The proposed surgical procedure is considered LOW risk.    Preop general physical exam, Age-related cataract of both eyes    Risks and Recommendations:  The patient has the following additional risks and recommendations for perioperative complications:   - No identified additional risk factors other than previously addressed    Medication Instructions:  Patient is to take all scheduled medications on the day of surgery    RECOMMENDATION:  APPROVAL GIVEN to proceed with proposed procedure, without further diagnostic evaluation.    Primary hypertension  Stable on losartan 25 mg and hydrochlorothiazide 25 mg  No change in medication in preparation for surgery     Dyslipidemia  Stable on Lipitor 20  No change in medication in preparation for surgery     Major depression in remission (H)  Stable on citalopram 20 mg  No change in medication in preparation for surgery     Invasive ductal carcinoma of breast, left (H)  On Arimidex 1 mg  No change in medication in preparation for surgery     Subjective     HPI related to upcoming procedure:     Preop Questions 6/6/2022   1. Have you ever had a heart attack or stroke? No   2. Have you ever had surgery on your heart or blood vessels, such as a  stent placement, a coronary artery bypass, or surgery on an artery in your head, neck, heart, or legs? No   3. Do you have chest pain with activity? No   4. Do you have a history of  heart failure? No   5. Do you currently have a cold, bronchitis or symptoms of other infection? No   6. Do you have a cough, shortness of breath, or wheezing? No   7. Do you or anyone in your family have previous history of blood clots? No   8. Do you or does anyone in your family have a serious bleeding problem such as prolonged bleeding following surgeries or cuts? No   9. Have you ever had problems with anemia or been told to take iron pills? No   10. Have you had any abnormal blood loss such as black, tarry or bloody stools, or abnormal vaginal bleeding? No   11. Have you ever had a blood transfusion? No   12. Are you willing to have a blood transfusion if it is medically needed before, during, or after your surgery? Yes   13. Have you or any of your relatives ever had problems with anesthesia? No   14. Do you have sleep apnea, excessive snoring or daytime drowsiness? No   15. Do you have any artifical heart valves or other implanted medical devices like a pacemaker, defibrillator, or continuous glucose monitor? No   16. Do you have artificial joints? No   17. Are you allergic to latex? No       Preoperative Review of :   reviewed - no record of controlled substances prescribed.      Status of Chronic Conditions:  See problem list for active medical problems.  Problems all longstanding and stable, except as noted/documented.  See ROS for pertinent symptoms related to these conditions.      Review of Systems  CONSTITUTIONAL: NEGATIVE for fever, chills, change in weight  INTEGUMENTARY/SKIN: NEGATIVE for worrisome rashes, moles or lesions  ENT/MOUTH: NEGATIVE for ear, mouth and throat problems  RESP: NEGATIVE for significant cough or SOB  CV: NEGATIVE for chest pain, palpitations or peripheral edema  GI: NEGATIVE for nausea,  abdominal pain, heartburn, or change in bowel habits  : NEGATIVE for frequency, dysuria, or hematuria  MUSCULOSKELETAL: NEGATIVE for significant arthralgias or myalgia  NEURO: NEGATIVE for weakness, dizziness or paresthesias  ENDOCRINE: NEGATIVE for temperature intolerance, skin/hair changes  HEME: NEGATIVE for bleeding problems  PSYCHIATRIC: NEGATIVE for changes in mood or affect    Patient Active Problem List    Diagnosis Date Noted     Invasive ductal carcinoma of breast, left (H) 08/24/2021     Priority: Medium     Added automatically from request for surgery [id number]       Benign neoplasm of colon 08/30/2020     Priority: Medium     Created by [facility] Annotation: Sep 15 2009 12:55PM - Judy Don: 11/04       Essential hypertension 03/04/2019     Priority: Medium     Major depression in remission (H) 10/10/2016     Priority: Medium      Past Medical History:   Diagnosis Date     CHF (congestive heart failure) (H) 2/26/2020     Gastroesophageal reflux disease      Hypertension      Inverted nipple     both breasts ever since she had her breast reduction surgery     Reflux esophagitis      Skin cancer      Past Surgical History:   Procedure Laterality Date     [facility] SURGICAL PATHOLOGY  2000     ENT SURGERY       HC REDUCTION OF LARGE BREAST      Description: Breast Surgery Reduction Procedure;  Proc Date: 09/01/2006;     LUMPECTOMY BREAST Left 9/7/2021    Procedure: LEFT WIRE LOCALIZED LUMPECTOMY WITH SENTINEL LYMPH NODE BIOPSY;  Surgeon: Kinza Kent DO;  [facility]: [facility] OR     MAMMOPLASTY REDUCTION Bilateral In the 2000's     Current Outpatient Medications   Medication Sig Dispense Refill     anastrozole (ARIMIDEX) 1 MG tablet Take 1 tablet (1 mg) by mouth daily 90 tablet 3     atorvastatin (LIPITOR) 20 MG tablet TAKE ONE TABLET BY MOUTH AT BEDTIME 90 tablet 0     citalopram (CELEXA) 20 MG tablet TAKE ONE TABLET BY MOUTH ONE TIME DAILY 90 tablet 0     hydrochlorothiazide  "(HYDRODIURIL) 25 MG tablet TAKE ONE TABLET BY MOUTH ONE TIME DAILY 90 tablet 0     hydrocortisone 2.5 % cream APPLY ONE APPLICATION TOPICALLY TWICE DAILY AS NEEDED       losartan (COZAAR) 25 MG tablet TAKE ONE TABLET BY MOUTH ONE TIME DAILY 90 tablet 0     S-Adenosylmethionine (MARY-E PO) Take 400 mg by mouth daily         Allergies   Allergen Reactions     Cortisone Other (See Comments)     Orally causes Depression     Amiloride      Other reaction(s): *Unknown Patient does not recall every having this med        Social History     Tobacco Use     Smoking status: Never Smoker     Smokeless tobacco: Never Used   Substance Use Topics     Alcohol use: Yes     Alcohol/week: 0.0 standard drinks     Family History   Problem Relation Age of Onset     Hypertension Mother      Cerebrovascular Disease Mother      Cerebrovascular Disease Father      Cerebrovascular Disease Brother      Depression Brother      History   Drug Use Unknown         Objective     /56 (BP Location: Left arm)   Pulse 79   Ht 1.499 m (4' 11\")   Wt 69 kg (152 lb 2 oz)   SpO2 97%   BMI 30.73 kg/m      Physical Exam    GENERAL APPEARANCE: healthy, alert and no distress     EYES: EOMI, PERRL     HENT: ear canals and TM's normal and nose and mouth without ulcers or lesions     NECK: no adenopathy, no asymmetry, masses, or scars and thyroid normal to palpation     RESP: lungs clear to auscultation - no rales, rhonchi or wheezes     CV: regular rates and rhythm, normal S1 S2, no S3 or S4 and no murmur, click or rub     ABDOMEN:  soft, nontender, no HSM or masses and bowel sounds normal     MS: extremities normal- no gross deformities noted, no evidence of inflammation in joints, FROM in all extremities.     SKIN: no suspicious lesions or rashes     NEURO: Normal strength and tone, sensory exam grossly normal, mentation intact and speech normal     PSYCH: mentation appears normal. and affect normal/bright     LYMPHATICS: No cervical " adenopathy    Recent Labs   Lab Test 02/14/22  1502 09/02/21  1529   HGB 15.0 14.5     --     140   POTASSIUM 4.1 3.4*   CR 0.85 0.87        Diagnostics:  No labs were ordered during this visit.   No EKG required for low risk surgery (cataract, skin procedure, breast biopsy, etc).    Revised Cardiac Risk Index (RCRI):  The patient has the following serious cardiovascular risks for perioperative complications:   - No serious cardiac risks = 0 points     RCRI Interpretation: 0 points: Class I (very low risk - 0.4% complication rate)           Signed Electronically by: Isabella Iverson MD  Copy of this evaluation report is provided to requesting physician.      Answers for HPI/ROS submitted by the patient on 6/6/2022  If you checked off any problems, how difficult have these problems made it for you to do your work, take care of things at home, or get along with other people?: Not difficult at all  PHQ9 TOTAL SCORE: 0

## 2022-07-20 ENCOUNTER — MYC MEDICAL ADVICE (OUTPATIENT)
Dept: ONCOLOGY | Facility: CLINIC | Age: 77
End: 2022-07-20

## 2022-07-27 NOTE — TELEPHONE ENCOUNTER
Unable to connect with patient. Phone not working.   Will try again at later date.  Taina Herrera RN on 7/27/2022 at 1:20 PM

## 2022-07-28 ENCOUNTER — HOSPITAL ENCOUNTER (OUTPATIENT)
Dept: MAMMOGRAPHY | Facility: CLINIC | Age: 77
Discharge: HOME | End: 2022-07-28
Attending: FAMILY MEDICINE | Admitting: FAMILY MEDICINE
Payer: COMMERCIAL

## 2022-07-28 DIAGNOSIS — Z12.31 VISIT FOR SCREENING MAMMOGRAM: ICD-10-CM

## 2022-07-28 PROCEDURE — 77067 SCR MAMMO BI INCL CAD: CPT

## 2022-07-30 ENCOUNTER — HEALTH MAINTENANCE LETTER (OUTPATIENT)
Age: 77
End: 2022-07-30

## 2022-08-02 ENCOUNTER — ONCOLOGY VISIT (OUTPATIENT)
Dept: ONCOLOGY | Facility: CLINIC | Age: 77
End: 2022-08-02
Attending: INTERNAL MEDICINE
Payer: COMMERCIAL

## 2022-08-02 VITALS
OXYGEN SATURATION: 97 % | BODY MASS INDEX: 30.46 KG/M2 | HEART RATE: 86 BPM | WEIGHT: 151.1 LBS | SYSTOLIC BLOOD PRESSURE: 125 MMHG | DIASTOLIC BLOOD PRESSURE: 71 MMHG | HEIGHT: 59 IN

## 2022-08-02 DIAGNOSIS — C50.912 INVASIVE DUCTAL CARCINOMA OF BREAST, LEFT (H): Primary | ICD-10-CM

## 2022-08-02 PROCEDURE — 99214 OFFICE O/P EST MOD 30 MIN: CPT | Performed by: INTERNAL MEDICINE

## 2022-08-02 PROCEDURE — G0463 HOSPITAL OUTPT CLINIC VISIT: HCPCS

## 2022-08-02 ASSESSMENT — ENCOUNTER SYMPTOMS
CONSTIPATION: 0
SORE THROAT: 0
BACK PAIN: 1
ABDOMINAL DISTENTION: 0
BLOOD IN STOOL: 0
ENDOCRINE NEGATIVE: 1
UNEXPECTED WEIGHT CHANGE: 0
CHILLS: 0
DIAPHORESIS: 0
TROUBLE SWALLOWING: 0
EYE PROBLEMS: 1
APPETITE CHANGE: 0
DIFFICULTY URINATING: 0
RECTAL PAIN: 0
FEVER: 0
NEUROLOGICAL NEGATIVE: 1
HEMATOLOGIC/LYMPHATIC NEGATIVE: 1
RESPIRATORY NEGATIVE: 1
NAUSEA: 0
ABDOMINAL PAIN: 1
CARDIOVASCULAR NEGATIVE: 1
FATIGUE: 1
VOMITING: 0
PSYCHIATRIC NEGATIVE: 1
DIARRHEA: 0
VOICE CHANGE: 0

## 2022-08-02 ASSESSMENT — PAIN SCALES - GENERAL: PAINLEVEL: NO PAIN (0)

## 2022-08-02 NOTE — LETTER
"    8/2/2022         RE: Ariana Hodge  [address] [place] [address]        Dear Colleague,    Thank you for referring your patient, Ariana Hodge, to the [facility]. Please see a copy of my visit note below.    Oncology Rooming Note    August 2, 2022 2:18 PM   Ariana Hodge is a 76 year old female who presents for:    Chief Complaint   Patient presents with     Oncology Clinic Visit     Invasive ductal carcinoma of breast, left,  Benign neoplasm of colon     Initial Vitals: /71 (BP Location: Left arm, Patient Position: Sitting, Cuff Size: Adult Regular)   Pulse 86   Ht 1.499 m (4' 11\")   Wt 68.5 kg (151 lb 1.6 oz)   SpO2 97%   BMI 30.52 kg/m   Estimated body mass index is 30.52 kg/m  as calculated from the following:    Height as of this encounter: 1.499 m (4' 11\").    Weight as of this encounter: 68.5 kg (151 lb 1.6 oz). Body surface area is 1.69 meters squared.  No Pain (0) Comment: Data Unavailable   No LMP recorded. Patient is postmenopausal.  Allergies reviewed: Yes  Medications reviewed: Yes    Medications: MEDICATION REFILLS NEEDED TODAY. Provider was notified.  Pharmacy name entered into [facility]: [id number] - [place], [place] - [address]    Clinical concerns: 6 month follow up      Charlotte Lara MA              Assessment & Plan     Early stage, low risk ER positive breast cancer on adjuvant anastrozole  No findings for recurrent cancer  Continue anastrozole, follow up in 6 months    Interval Visit  This is a scheduled 6 month return visit.  The patient reports that she's doing well overall.  She does have a few problems for which she plans to see her PCP: occasional pains in her left lower side, some arthritis in her hands, deafness in one ear and diminished hearing in the other.    She has no problems with her anastrozole.  Her recent Mammogram was normal (BIRADS 2)    She hopes to do some traveling between now and her next visit.  She'd " like to get motivated to do more exercise.    Lives in an apartment with two cats.    Oncologic History as recorded by Tommy Caputo MD on 14 Feb 2022  Diagnosis:   Invasive ductal carcinoma of the left breast: Diagnosed August 2021. Upper inner quadrant. Grade 2, ER/OH positive, HER-2/josh negative. Tumor size was 15 x 13 x 8 mm. 2 sentinel lymph nodes were negative.  Oncotype recurrence score is 27 (16%).     Treatment:     Breast conservation surgery performed on September 7, 2021.  Arimidex started mid October 2021.    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     Facility-Administered Medications Ordered in Other Visits   Medication     lidocaine 1 % 10 mL     lidocaine 1 % 10 mL     lidocaine 1% with EPINEPHrine 1:100,000 injection 10 mL     Review of Systems   Constitutional: Positive for fatigue. Negative for appetite change, chills, diaphoresis, fever and unexpected weight change.   HENT:   Positive for hearing loss. Negative for lump/mass, mouth sores, nosebleeds, sore throat, tinnitus, trouble swallowing and voice change.    Eyes: Positive for eye problems (just had cataract surgery recently).   Respiratory: Negative.    Cardiovascular: Negative.    Gastrointestinal: Positive for abdominal pain. Negative for abdominal distention, blood in stool, constipation, diarrhea, nausea, rectal pain and vomiting.   Endocrine: Negative.    Genitourinary: Negative.  Negative for difficulty urinating.   "  Musculoskeletal: Positive for back pain (gets spasms in left lower back).        Has some problems with stiff hands and right trigger finger   Skin: Negative.    Neurological: Negative.    Hematological: Negative.    Psychiatric/Behavioral: Negative.    All other systems reviewed and are negative.      /71 (BP Location: Left arm, Patient Position: Sitting, Cuff Size: Adult Regular)   Pulse 86   Ht 1.499 m (4' 11\")   Wt 68.5 kg (151 lb 1.6 oz)   SpO2 97%   BMI 30.52 kg/m      Physical Exam  Vitals and nursing note reviewed.   Constitutional:       Appearance: Normal appearance.   Eyes:      Extraocular Movements: Extraocular movements intact.      Pupils: Pupils are equal, round, and reactive to light.   Pulmonary:      Effort: Pulmonary effort is normal.      Breath sounds: Normal breath sounds. No wheezing or rales.   Chest:   Breasts:      Right: Inverted nipple present.      Left: Inverted nipple present.            Comments: Scar at resection site, no mass or visible abnormality  Abdominal:      Palpations: Abdomen is soft. There is no mass.      Tenderness: There is no abdominal tenderness.   Musculoskeletal:         General: No deformity.      Cervical back: Normal range of motion and neck supple.      Right lower leg: No edema.      Left lower leg: No edema.   Skin:     General: Skin is warm and dry.      Findings: No rash.   Neurological:      General: No focal deficit present.      Mental Status: She is alert and oriented to person, place, and time.      Cranial Nerves: No cranial nerve deficit.      Motor: No weakness.      Gait: Gait normal.      Deep Tendon Reflexes: Reflexes normal.   Psychiatric:         Mood and Affect: Mood normal.         Behavior: Behavior normal.         Thought Content: Thought content normal.         Judgment: Judgment normal.         No results found for this or any previous visit (from the past 720 hour(s)).    Recent Results (from the past 744 hour(s))   *MA " Screening Digital Bilateral    Narrative    BILATERAL FULL FIELD DIGITAL SCREENING MAMMOGRAM    Performed on: 7/28/22    Compared to: 08/10/2021, 07/26/2021, 01/17/2020, 12/19/2017, 05/19/2015,   and 05/05/2014    Technique: This study was evaluated with the assistance of Computer-Aided   Detection.    Findings: The breasts have scattered areas of fibroglandular density.   There are interval breast conservation changes in the left breast. There   are changes of breast reduction of both breasts.   There is no radiographic evidence of malignancy.     IMPRESSION: ACR BI-RADS Category 2: Benign    RECOMMENDED FOLLOW-UP: Annual routine screening mammogram    The results and recommendations of this examination will be communicated   to the patient.             Again, thank you for allowing me to participate in the care of your patient.        Sincerely,        Nova Cantu MD

## 2022-08-02 NOTE — PROGRESS NOTES
"Oncology Rooming Note    August 2, 2022 2:18 PM   Ariana Hodge is a 76 year old female who presents for:    Chief Complaint   Patient presents with     Oncology Clinic Visit     Invasive ductal carcinoma of breast, left,  Benign neoplasm of colon     Initial Vitals: /71 (BP Location: Left arm, Patient Position: Sitting, Cuff Size: Adult Regular)   Pulse 86   Ht 1.499 m (4' 11\")   Wt 68.5 kg (151 lb 1.6 oz)   SpO2 97%   BMI 30.52 kg/m   Estimated body mass index is 30.52 kg/m  as calculated from the following:    Height as of this encounter: 1.499 m (4' 11\").    Weight as of this encounter: 68.5 kg (151 lb 1.6 oz). Body surface area is 1.69 meters squared.  No Pain (0) Comment: Data Unavailable   No LMP recorded. Patient is postmenopausal.  Allergies reviewed: Yes  Medications reviewed: Yes    Medications: MEDICATION REFILLS NEEDED TODAY. Provider was notified.  Pharmacy name entered into [facility]: [id number] [place], [place] - [address]    Clinical concerns: 6 month follow up      Charlotte Lara MA            "

## 2022-08-02 NOTE — PROGRESS NOTES
Assessment & Plan     Early stage, low risk ER positive breast cancer on adjuvant anastrozole  No findings for recurrent cancer  Continue anastrozole, follow up in 6 months    Interval Visit  This is a scheduled 6 month return visit.  The patient reports that she's doing well overall.  She does have a few problems for which she plans to see her PCP: occasional pains in her left lower side, some arthritis in her hands, deafness in one ear and diminished hearing in the other.    She has no problems with her anastrozole.  Her recent Mammogram was normal (BIRADS 2)    She hopes to do some traveling between now and her next visit.  She'd like to get motivated to do more exercise.    Lives in an apartment with two cats.    Oncologic History as recorded by Tommy Caputo MD on 14 Feb 2022  Diagnosis:   Invasive ductal carcinoma of the left breast: Diagnosed August 2021. Upper inner quadrant. Grade 2, ER/AK positive, HER-2/josh negative. Tumor size was 15 x 13 x 8 mm. 2 sentinel lymph nodes were negative.  Oncotype recurrence score is 27 (16%).     Treatment:     Breast conservation surgery performed on September 7, 2021.  Arimidex started mid October 2021.    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     Facility-Administered Medications Ordered in Other Visits   Medication     lidocaine 1 % 10 mL     lidocaine 1 % 10 mL     lidocaine 1% with EPINEPHrine 1:100,000 injection 10 mL     Review of Systems  "  Constitutional: Positive for fatigue. Negative for appetite change, chills, diaphoresis, fever and unexpected weight change.   HENT:   Positive for hearing loss. Negative for lump/mass, mouth sores, nosebleeds, sore throat, tinnitus, trouble swallowing and voice change.    Eyes: Positive for eye problems (just had cataract surgery recently).   Respiratory: Negative.    Cardiovascular: Negative.    Gastrointestinal: Positive for abdominal pain. Negative for abdominal distention, blood in stool, constipation, diarrhea, nausea, rectal pain and vomiting.   Endocrine: Negative.    Genitourinary: Negative.  Negative for difficulty urinating.    Musculoskeletal: Positive for back pain (gets spasms in left lower back).        Has some problems with stiff hands and right trigger finger   Skin: Negative.    Neurological: Negative.    Hematological: Negative.    Psychiatric/Behavioral: Negative.    All other systems reviewed and are negative.      /71 (BP Location: Left arm, Patient Position: Sitting, Cuff Size: Adult Regular)   Pulse 86   Ht 1.499 m (4' 11\")   Wt 68.5 kg (151 lb 1.6 oz)   SpO2 97%   BMI 30.52 kg/m      Physical Exam  Vitals and nursing note reviewed.   Constitutional:       Appearance: Normal appearance.   Eyes:      Extraocular Movements: Extraocular movements intact.      Pupils: Pupils are equal, round, and reactive to light.   Pulmonary:      Effort: Pulmonary effort is normal.      Breath sounds: Normal breath sounds. No wheezing or rales.   Chest:   Breasts:      Right: Inverted nipple present.      Left: Inverted nipple present.            Comments: Scar at resection site, no mass or visible abnormality  Abdominal:      Palpations: Abdomen is soft. There is no mass.      Tenderness: There is no abdominal tenderness.   Musculoskeletal:         General: No deformity.      Cervical back: Normal range of motion and neck supple.      Right lower leg: No edema.      Left lower leg: No edema. "   Skin:     General: Skin is warm and dry.      Findings: No rash.   Neurological:      General: No focal deficit present.      Mental Status: She is alert and oriented to person, place, and time.      Cranial Nerves: No cranial nerve deficit.      Motor: No weakness.      Gait: Gait normal.      Deep Tendon Reflexes: Reflexes normal.   Psychiatric:         Mood and Affect: Mood normal.         Behavior: Behavior normal.         Thought Content: Thought content normal.         Judgment: Judgment normal.         No results found for this or any previous visit (from the past 720 hour(s)).    Recent Results (from the past 744 hour(s))   *MA Screening Digital Bilateral    Narrative    BILATERAL FULL FIELD DIGITAL SCREENING MAMMOGRAM    Performed on: 7/28/22    Compared to: 08/10/2021, 07/26/2021, 01/17/2020, 12/19/2017, 05/19/2015,   and 05/05/2014    Technique: This study was evaluated with the assistance of Computer-Aided   Detection.    Findings: The breasts have scattered areas of fibroglandular density.   There are interval breast conservation changes in the left breast. There   are changes of breast reduction of both breasts.   There is no radiographic evidence of malignancy.     IMPRESSION: ACR BI-RADS Category 2: Benign    RECOMMENDED FOLLOW-UP: Annual routine screening mammogram    The results and recommendations of this examination will be communicated   to the patient.

## 2022-08-04 NOTE — TELEPHONE ENCOUNTER
Called and spoke with patient regarding cancer treatment summary sent to [facility].   Patient did not receive summary in [facility]. Writer will mail out a copy to patient.   Reviewed contents of summary and resources available. Patient stated she looks forward to reviewing summary. No other questions or concerns at this time.   Taina Herrera RN on 8/4/2022 at 11:20 AM

## 2022-08-05 DIAGNOSIS — I10 ESSENTIAL HYPERTENSION: ICD-10-CM

## 2022-08-05 DIAGNOSIS — F32.5 MAJOR DEPRESSION IN REMISSION (H): ICD-10-CM

## 2022-08-05 DIAGNOSIS — E78.5 DYSLIPIDEMIA: ICD-10-CM

## 2022-08-05 RX ORDER — HYDROCHLOROTHIAZIDE 25 MG/1
TABLET ORAL
Qty: 90 TABLET | Refills: 1 | Status: SHIPPED | OUTPATIENT
Start: 2022-08-05 | End: 2023-02-03

## 2022-08-05 RX ORDER — CITALOPRAM HYDROBROMIDE 20 MG/1
TABLET ORAL
Qty: 90 TABLET | Refills: 0 | Status: SHIPPED | OUTPATIENT
Start: 2022-08-05 | End: 2022-10-14

## 2022-08-05 NOTE — TELEPHONE ENCOUNTER
"Last Written Prescription Date:  5/10/22  Last Fill Quantity: 90,  # refills: 0   Last office visit provider:  6/6/22     Requested Prescriptions   Pending Prescriptions Disp Refills     hydrochlorothiazide (HYDRODIURIL) 25 MG tablet [Pharmacy Med Name: hydroCHLOROthiazide Oral Tablet 25 MG] 90 tablet 0     Sig: TAKE ONE TABLET BY MOUTH ONE TIME DAILY       Diuretics (Including Combos) Protocol Passed - 8/5/2022  2:01 AM        Passed - Blood pressure under 140/90 in past 12 months     BP Readings from Last 3 Encounters:   08/02/22 125/71   06/06/22 120/56   02/14/22 (!) 140/80                 Passed - Recent (12 mo) or future (30 days) visit within the authorizing provider's specialty     Patient has had an office visit with the authorizing provider or a provider within the authorizing providers department within the previous 12 mos or has a future within next 30 days. See \"Patient Info\" tab in inbasket, or \"Choose Columns\" in Meds & Orders section of the refill encounter.              Passed - Medication is active on med list        Passed - Patient is age 18 or older        Passed - No active pregancy on record        Passed - Normal serum creatinine on file in past 12 months     Recent Labs   Lab Test 02/14/22  1502   CR 0.85              Passed - Normal serum potassium on file in past 12 months     Recent Labs   Lab Test 02/14/22  1502   POTASSIUM 4.1                    Passed - Normal serum sodium on file in past 12 months     Recent Labs   Lab Test 02/14/22  1502                 Passed - No positive pregnancy test in past 12 months           citalopram (CELEXA) 20 MG tablet [Pharmacy Med Name: Citalopram Hydrobromide Oral Tablet 20 MG] 90 tablet 0     Sig: TAKE ONE TABLET BY MOUTH ONE TIME DAILY       SSRIs Protocol Passed - 8/5/2022  2:01 AM        Passed - PHQ-9 score less than 5 in past 6 months     Please review last PHQ-9 score.           Passed - Medication is active on med list        Passed - " "Patient is age 18 or older        Passed - No active pregnancy on record        Passed - No positive pregnancy test in last 12 months        Passed - Recent (6 mo) or future (30 days) visit within the authorizing provider's specialty     Patient had office visit in the last 6 months or has a visit in the next 30 days with authorizing provider or within the authorizing provider's specialty.  See \"Patient Info\" tab in inbasket, or \"Choose Columns\" in Meds & Orders section of the refill encounter.               atorvastatin (LIPITOR) 20 MG tablet [Pharmacy Med Name: Atorvastatin Calcium Oral Tablet 20 MG] 90 tablet 0     Sig: TAKE ONE TABLET BY MOUTH AT BEDTIME       Statins Protocol Failed - 8/5/2022  2:01 AM        Failed - LDL on file in past 12 months     Recent Labs   Lab Test 05/17/21  1152   LDL 77             Passed - No abnormal creatine kinase in past 12 months     No lab results found.             Passed - Recent (12 mo) or future (30 days) visit within the authorizing provider's specialty     Patient has had an office visit with the authorizing provider or a provider within the authorizing providers department within the previous 12 mos or has a future within next 30 days. See \"Patient Info\" tab in [employer], or \"Choose Columns\" in Meds & Orders section of the refill encounter.              Passed - Medication is active on med list        Passed - Patient is age 18 or older        Passed - No active pregnancy on record        Passed - No positive pregnancy test in past 12 months             Niurka Smiley RN 08/05/22 6:35 PM  "

## 2022-08-05 NOTE — TELEPHONE ENCOUNTER
"Routing refill request to provider for review/approval because:  Labs not current:  Ldl    Last Written Prescription Date:  5/10/22  Last Fill Quantity: 90,  # refills: 0   Last office visit provider:  6/6/22     Requested Prescriptions   Pending Prescriptions Disp Refills     atorvastatin (LIPITOR) 20 MG tablet [Pharmacy Med Name: Atorvastatin Calcium Oral Tablet 20 MG] 90 tablet 0     Sig: TAKE ONE TABLET BY MOUTH AT BEDTIME       Statins Protocol Failed - 8/5/2022  2:01 AM        Failed - LDL on file in past 12 months     Recent Labs   Lab Test 05/17/21  1152   LDL 77             Passed - No abnormal creatine kinase in past 12 months     No lab results found.             Passed - Recent (12 mo) or future (30 days) visit within the authorizing provider's specialty     Patient has had an office visit with the authorizing provider or a provider within the authorizing providers department within the previous 12 mos or has a future within next 30 days. See \"Patient Info\" tab in inbasket, or \"Choose Columns\" in Meds & Orders section of the refill encounter.              Passed - Medication is active on med list        Passed - Patient is age 18 or older        Passed - No active pregnancy on record        Passed - No positive pregnancy test in past 12 months         Signed Prescriptions Disp Refills    hydrochlorothiazide (HYDRODIURIL) 25 MG tablet 90 tablet 1     Sig: TAKE ONE TABLET BY MOUTH ONE TIME DAILY       Diuretics (Including Combos) Protocol Passed - 8/5/2022  2:01 AM        Passed - Blood pressure under 140/90 in past 12 months     BP Readings from Last 3 Encounters:   08/02/22 125/71   06/06/22 120/56   02/14/22 (!) 140/80                 Passed - Recent (12 mo) or future (30 days) visit within the authorizing provider's specialty     Patient has had an office visit with the authorizing provider or a provider within the authorizing providers department within the previous 12 mos or has a future within next " "30 days. See \"Patient Info\" tab in inbasket, or \"Choose Columns\" in Meds & Orders section of the refill encounter.              Passed - Medication is active on med list        Passed - Patient is age 18 or older        Passed - No active pregancy on record        Passed - Normal serum creatinine on file in past 12 months     Recent Labs   Lab Test 02/14/22  1502   CR 0.85              Passed - Normal serum potassium on file in past 12 months     Recent Labs   Lab Test 02/14/22  1502   POTASSIUM 4.1                    Passed - Normal serum sodium on file in past 12 months     Recent Labs   Lab Test 02/14/22  1502                 Passed - No positive pregnancy test in past 12 months          citalopram (CELEXA) 20 MG tablet 90 tablet 0     Sig: TAKE ONE TABLET BY MOUTH ONE TIME DAILY       SSRIs Protocol Passed - 8/5/2022  2:01 AM        Passed - PHQ-9 score less than 5 in past 6 months     Please review last PHQ-9 score.           Passed - Medication is active on med list        Passed - Patient is age 18 or older        Passed - No active pregnancy on record        Passed - No positive pregnancy test in last 12 months        Passed - Recent (6 mo) or future (30 days) visit within the authorizing provider's specialty     Patient had office visit in the last 6 months or has a visit in the next 30 days with authorizing provider or within the authorizing provider's specialty.  See \"Patient Info\" tab in inbasket, or \"Choose Columns\" in Meds & Orders section of the refill encounter.                 Niurka Smiley RN 08/05/22 6:36 PM  "

## 2022-08-08 RX ORDER — ATORVASTATIN CALCIUM 20 MG/1
TABLET, FILM COATED ORAL
Qty: 90 TABLET | Refills: 0 | Status: SHIPPED | OUTPATIENT
Start: 2022-08-08 | End: 2022-10-14

## 2022-08-16 ENCOUNTER — HOSPITAL ENCOUNTER (OUTPATIENT)
Facility: CLINIC | Age: 77
Discharge: HOME | End: 2022-08-16
Admitting: FAMILY MEDICINE
Payer: COMMERCIAL

## 2022-08-16 PROCEDURE — 84999 UNLISTED CHEMISTRY PROCEDURE: CPT

## 2022-08-16 PROCEDURE — 88377 M/PHMTRC ALYS ISHQUANT/SEMIQ: CPT

## 2022-09-09 NOTE — PROGRESS NOTES
History:  Ariana Hodge presents for one year follow-up of breast cancer.  She is s/p left lumpectomy with SLN biopsy in September 2021.  This was followed by initiation of endocrine therapy with anastrozole.  She is doing well.  Denies any side effects from the anastrozole.    Physical exam:  BREAST: Scars from bilateral breast reduction, left lumpectomy in the upper inner quadrant, and left axillary lymph node biopsy.  No palpable masses or abnormalities bilaterally    Imaging:  Pertinent images personally reviewed by myself and discussed with the patient.  Radiology reports:  BILATERAL FULL FIELD DIGITAL SCREENING MAMMOGRAM  Performed on: 7/28/22  Compared to: 08/10/2021, 07/26/2021, 01/17/2020, 12/19/2017, 05/19/2015, and 05/05/2014  Technique: This study was evaluated with the assistance of Computer-Aided Detection.  Findings: The breasts have scattered areas of fibroglandular density. There are interval breast conservation changes in the left breast. There are changes of breast reduction of both breasts.   There is no radiographic evidence of malignancy.      IMPRESSION: ACR BI-RADS Category 2: Benign  RECOMMENDED FOLLOW-UP: Annual routine screening mammogram    ASSESSMENT:  Ariana Hodge is s/p breast preservation therapy for left-sided invasive ductal carcinoma    - oncotype 27    PLAN:  Most recent imaging reviewed by myself  Continue with yearly mammograms and clinical breast exams    - She will continue through her pcp  Return to the breast clinic as needed    Kinza Kent DO  General Surgeon  [address] [address]  Office: [phone]  Employed by - [facility]

## 2022-09-12 ENCOUNTER — OFFICE VISIT (OUTPATIENT)
Dept: SURGERY | Facility: CLINIC | Age: 77
End: 2022-09-12
Attending: FAMILY MEDICINE
Payer: COMMERCIAL

## 2022-09-12 DIAGNOSIS — Z85.3 HX: BREAST CANCER: Primary | ICD-10-CM

## 2022-09-12 PROCEDURE — G0463 HOSPITAL OUTPT CLINIC VISIT: HCPCS

## 2022-09-12 PROCEDURE — 99213 OFFICE O/P EST LOW 20 MIN: CPT | Performed by: SURGERY

## 2022-09-12 NOTE — LETTER
9/12/2022         RE: Ariana Hodge  [address] [place] [address]        Dear Colleague,    Thank you for referring your patient, Ariana Hodge, to the [facility] [place]. Please see a copy of my visit note below.    History:  Ariana Hodge presents for one year follow-up of breast cancer.  She is s/p left lumpectomy with SLN biopsy in September 2021.  This was followed by initiation of endocrine therapy with anastrozole.  She is doing well.  Denies any side effects from the anastrozole.    Physical exam:  BREAST: Scars from bilateral breast reduction, left lumpectomy in the upper inner quadrant, and left axillary lymph node biopsy.  No palpable masses or abnormalities bilaterally    Imaging:  Pertinent images personally reviewed by myself and discussed with the patient.  Radiology reports:  BILATERAL FULL FIELD DIGITAL SCREENING MAMMOGRAM  Performed on: 7/28/22  Compared to: 08/10/2021, 07/26/2021, 01/17/2020, 12/19/2017, 05/19/2015, and 05/05/2014  Technique: This study was evaluated with the assistance of Computer-Aided Detection.  Findings: The breasts have scattered areas of fibroglandular density. There are interval breast conservation changes in the left breast. There are changes of breast reduction of both breasts.   There is no radiographic evidence of malignancy.      IMPRESSION: ACR BI-RADS Category 2: Benign  RECOMMENDED FOLLOW-UP: Annual routine screening mammogram    ASSESSMENT:  Ariana Hodge is s/p breast preservation therapy for left-sided invasive ductal carcinoma    - oncotype 27    PLAN:  Most recent imaging reviewed by myself  Continue with yearly mammograms and clinical breast exams    - She will continue through her pcp  Return to the breast clinic as needed    Kinza Kent DO  General Surgeon  [facility]  [address] [place] [id number]  Office: [phone]  Employed by -  [employer]        Again, thank you for allowing me to participate in the care of your patient.        Sincerely,        Kinza Kent, DO

## 2022-09-12 NOTE — NURSING NOTE
Ariana presents to [facility] for a follow up appointment with Dr. Kent .Patient notes no new breast concerns.  Patient had mammogram done recently, see report for details.  She is following with Dr. Caputo  for medical oncology and is taking endocrine therapy, tolerating it well.  RN assessment and EMRupdate.  Patient met with Dr. Kent .  See dictation for details of visit and follow up plan.  Support provided, invited calls.  RN time 12 mins.

## 2022-09-27 ENCOUNTER — TRANSFERRED RECORDS (OUTPATIENT)
Dept: HEALTH INFORMATION MANAGEMENT | Facility: CLINIC | Age: 77
End: 2022-09-27

## 2022-10-09 ENCOUNTER — HEALTH MAINTENANCE LETTER (OUTPATIENT)
Age: 77
End: 2022-10-09

## 2022-10-13 ENCOUNTER — NURSE TRIAGE (OUTPATIENT)
Dept: NURSING | Facility: CLINIC | Age: 77
End: 2022-10-13

## 2022-10-13 NOTE — TELEPHONE ENCOUNTER
Patient is Covid Positive and she has an appointment set up for tomorrow.    She wants some home care advice on how to help with her symptoms until her appointment.    Patient given home care advice.  Tatiana Hernandez RN on 10/13/2022 at 4:25 PM      Reason for Disposition    [1] HIGH RISK for severe COVID complications (e.g., weak immune system, age > 64 years, obesity with BMI of 30 or higher, pregnant, chronic lung disease or other chronic medical condition) AND [2] COVID symptoms (e.g., cough, fever)  (Exceptions: Already seen by PCP and no new or worsening symptoms.)    Additional Information    Negative: SEVERE difficulty breathing (e.g., struggling for each breath, speaks in single words)    Negative: Difficult to awaken or acting confused (e.g., disoriented, slurred speech)    Negative: Bluish (or gray) lips or face now    Negative: Shock suspected (e.g., cold/pale/clammy skin, too weak to stand, low BP, rapid pulse)    Negative: Sounds like a life-threatening emergency to the triager    Negative: [1] Diagnosed or suspected COVID-19 AND [2] symptoms lasting 3 or more weeks    Negative: [1] COVID-19 exposure AND [2] no symptoms    Negative: COVID-19 vaccine reaction suspected (e.g., fever, headache, muscle aches) occurring 1 to 3 days after getting vaccine    Negative: COVID-19 vaccine, questions about    Negative: [1] Lives with someone known to have influenza (flu test positive) AND [2] flu-like symptoms (e.g., cough, runny nose, sore throat, SOB; with or without fever)    Negative: [1] Adult with possible COVID-19 symptoms AND [2] triager concerned about severity of symptoms or other causes    Negative: COVID-19 and breastfeeding, questions about    Negative: SEVERE or constant chest pain or pressure  (Exception: Mild central chest pain, present only when coughing.)    Negative: MODERATE difficulty breathing (e.g., speaks in phrases, SOB even at rest, pulse 100-120)    Negative: Headache and stiff neck  (can't touch chin to chest)    Negative: Oxygen level (e.g., pulse oximetry) 90 percent or lower    Negative: Chest pain or pressure  (Exception: MILD central chest pain, present only when coughing)    Negative: Patient sounds very sick or weak to the triager    Negative: MILD difficulty breathing (e.g., minimal/no SOB at rest, SOB with walking, pulse <100)    Negative: Fever > 103 F (39.4 C)    Negative: [1] Fever > 101 F (38.3 C) AND [2] over 60 years of age    Negative: [1] Fever > 100.0 F (37.8 C) AND [2] bedridden (e.g., nursing home patient, CVA, chronic illness, recovering from surgery)    Protocols used: CORONAVIRUS (COVID-19) DIAGNOSED OR [id number]

## 2022-10-14 ENCOUNTER — VIRTUAL VISIT (OUTPATIENT)
Dept: FAMILY MEDICINE | Facility: CLINIC | Age: 77
End: 2022-10-14
Payer: COMMERCIAL

## 2022-10-14 DIAGNOSIS — U07.1 INFECTION DUE TO 2019 NOVEL CORONAVIRUS: Primary | ICD-10-CM

## 2022-10-14 DIAGNOSIS — I10 ESSENTIAL HYPERTENSION: ICD-10-CM

## 2022-10-14 DIAGNOSIS — F32.5 MAJOR DEPRESSION IN REMISSION (H): ICD-10-CM

## 2022-10-14 DIAGNOSIS — E78.5 DYSLIPIDEMIA: ICD-10-CM

## 2022-10-14 DIAGNOSIS — C50.912 INVASIVE DUCTAL CARCINOMA OF BREAST, STAGE 1, LEFT (H): ICD-10-CM

## 2022-10-14 PROCEDURE — 99442 PR PHYSICIAN TELEPHONE EVALUATION 11-20 MIN: CPT | Mod: CS | Performed by: PHYSICIAN ASSISTANT

## 2022-10-14 RX ORDER — LOSARTAN POTASSIUM 25 MG/1
TABLET ORAL
Qty: 90 TABLET | Refills: 0 | Status: SHIPPED | OUTPATIENT
Start: 2022-10-14 | End: 2023-02-28

## 2022-10-14 RX ORDER — CITALOPRAM HYDROBROMIDE 20 MG/1
TABLET ORAL
Qty: 90 TABLET | Refills: 0 | Status: SHIPPED | OUTPATIENT
Start: 2022-10-14 | End: 2023-03-03

## 2022-10-14 RX ORDER — ATORVASTATIN CALCIUM 20 MG/1
TABLET, FILM COATED ORAL
Qty: 90 TABLET | Refills: 0 | Status: SHIPPED | OUTPATIENT
Start: 2022-10-14 | End: 2023-03-03

## 2022-10-14 NOTE — PROGRESS NOTES
Ariana is a 76 year old who is being evaluated via a billable telephone visit.      What phone number would you like to be contacted at? [phone]   How would you like to obtain your AVS? Mail a copy    Assessment & Plan     Infection due to 2019 novel coronavirus  Present and within 5 days of symptoms. Risk factors. paxlovid discussed. Hold lipitor while taking. If severe chest pains or shortness of breath develop, go to ER. Call pharmacy.   - nirmatrelvir and ritonavir (PAXLOVID) therapy pack; Take 3 tablets by mouth 2 times daily for 5 days (Take 2 Nirmatrelvir tablets and 1 Ritonavir tablet twice daily for 5 days)  -Medication use and side effects discussed with the patient. Patient is in complete understanding and agreement with plan.          No follow-ups on file.   Follow-up Visit   Expected date:  Nov 14, 2022 (Approximate)      Follow Up Appointment Details:     Follow-up with whom?: PCP    Follow-Up for what?: Medicare Wellness    Welcome or Annual?: Annual Wellness    How?: In Person                    Naveed Jimenez PA-C  [facility]   Ariana is a 76 year old, presenting for the following health issues:  Covid Concern      HPI       COVID-19 Symptom Review  How many days ago did these symptoms start? Two days ago     Are any of the following symptoms significant for you?    New or worsening difficulty breathing? No    Worsening cough? No    Fever or chills? Yes, I felt feverish or had chills.    Headache: No    Sore throat: YES    Chest pain: No    Diarrhea: No    Body aches? No    What treatments has patient tried? Cough syrup   Does patient live in a nursing home, group home, or shelter? No  Does patient have a way to get food/medications during quarantined? Yes, I have a friend or family member who can help me.            Review of Systems   Constitutional, HEENT, cardiovascular, pulmonary, GI, , musculoskeletal, neuro, skin, endocrine and psych  systems are negative, except as otherwise noted.      Objective    Vitals - Patient Reported  Weight (Patient Reported): 68 kg (150 lb)  Height (Patient Reported): 152.4 cm (5')  BMI (Based on Pt Reported Ht/Wt): 29.29  Temperature (Patient Reported): 99  F (37.2  C)      Vitals:  No vitals were obtained today due to virtual visit.    Physical Exam   alert, no distress and fatigued  PSYCH: Alert and oriented times 3; coherent speech, normal   rate and volume, able to articulate logical thoughts, able   to abstract reason, no tangential thoughts, no hallucinations   or delusions  Her affect is normal  RESP: No cough, no audible wheezing, able to talk in full sentences  Remainder of exam unable to be completed due to telephone visits          Phone call duration: 15 minutes

## 2022-10-14 NOTE — PATIENT INSTRUCTIONS
COVID-19 Outpatient Treatments  Your care team can help you find the best treatments for COVID-19. Talk to a health care provider or refer to the FDA medicine fact sheets below.    Important: You CAN'T have molnupiravir or Paxlovid if you are starting the medicine more than 5 days after your symptoms have started.  Paxlovid: https://www.fda.gov/media/149374/download  Molnupiravir: https://www.fda.gov/media/892966/download  Monoclonal antibodies: https://combatcovid.hhs.gov/what-are-monoclonal-antibodies  Paxlovid (nimatrelvir and ritonavir)  How it works  Two medicines (nirmatrelvir and ritonavir) are taken together. They stop the virus from growing. Less amount of virus is easier for your body to fight.  Benefits  Lowers risk of a hospital stay or death from COVID-19.  How to take    Medicine comes in a daily container with both medicine tablets. Take by mouth twice daily (once in the morning, once at night) for 5 days.    The number of tablets to take varies by patient.    Don't chew or break capsules. Swallow whole.  When to take  Take as soon as possible after positive COVID-19 test result, and within 5 days of your first symptoms.  Who can take it  Patients must be 12 years or older, weigh at least 88 pounds, and have tested positive for COVID-19. This is the preferred treatment for pregnant patients.  Possible side effects  Can cause altered sense of taste, diarrhea (loose, watery stools), high blood pressure, muscle aches.  Medicine conflicts    Some medicines may conflict with Paxlovid and may cause serious side effects.    Tell your care team about all the medicines you take, including prescription and over-the-counter medicines, vitamins and herbal supplements.    Your provider will review your medicines to make sure that you can safely take Paxlovid.  Cautions    Paxlovid is not advised for patients with severe kidney or liver disease. If you have kidney or liver problems, the dose may need to be  changed.    If you are pregnant or breastfeeding, talk to your care team about your options.    If you are sexually active, use trusted birth control while taking Paxlovid.  Molnupiravir  How it works  Stops the virus from growing. Less amount of virus is easier for your body to fight.  Benefits  Lowers risk of a hospital stay or death from COVID-19.  How to take  Take 4 capsules by mouth every 12 hours (4 in the morning and 4 at night) for 5 days. Don't chew or break capsules. Swallow whole.  When to take  Take as soon as possible after positive COVID-19 test result, and within 5 days of your first symptoms.  Who can take it  Patients must be 18 years or older and have tested positive for COVID-19.  Possible side effects  Diarrhea (loose, watery stools), nausea (feeling sick to your stomach), dizziness, headaches.  Medicine conflicts  Right now, there are no known conflicts with other drugs. But tell your care team all medicines you take.  Cautions    This is not advised for patients who are pregnant.    Patients who could become pregnant should use trusted birth control until 4 days after their last dose.    Sexually active people of any gender should use trusted birth control for 3 months after their last dose.  Monoclonal antibodies  How it works  Monoclonal antibodies can detect pieces of the COVID virus and stop it from infecting your cells.  Benefits  Lowers risk of a hospital stay or death from COVID-19. Monoclonal antibodies are known to work well against the omicron variant.  How it is given to you  You will receive the treatment either by an infusion through your vein (IV) or shots.  When to take  Get as soon as possible after you test positive for COVID-19, and within 7 days of your first symptoms.  Who can take it  Patients must be 12 years or older, weigh at least 88 pounds and have tested positive for COVID-19.  Possible side effects  Fever, chills, diarrhea (loose, watery stools), dizziness,  itchiness and rash.  More serious side effects include: fever, difficulty breathing, low oxygen level in your blood, chills, tiredness, fast or slow heart rate, chest discomfort or pain, weakness, confusion, nausea, headache, shortness of breath, low or high blood pressure, wheezing, swelling of your lips, face, or throat, rash including hives, itching, muscle aches, dizziness, feeling faint and sweating.  If you receive an IV, you may have brief pain, bleeding and bruising of the skin, soreness, swelling and possible infection at the place where you get the IV needle.  Medicine conflicts  Please tell you care team other medicines you take so they can assess if there are any conflicts.  Cautions  Your doctor will talk with you about risks and benefits of this treatment and will help choose the best option for you.  For informational purposes only. Not to replace the advice of your health care provider.  Copyright   2022 [facility]. All rights reserved. Clinically reviewed by Phyllis Amaya. [employer] [id number] - 08/22.    Instructions for Patients      What are the symptoms of COVID-19?  Symptoms can include fever, cough, shortness of breath, chills, headache, muscle pain sore throat, fatigue, runny or stuffy nose, and loss of taste and smell. Other less common symptoms include nausea, vomiting, or diarrhea (watery stools).    Know when to call 911. Emergency warning signs include:    Trouble breathing or shortness of breath    Pain or pressure in the chest that doesn't go away    Feeling confused like you haven't felt before, or not being able to wake up    Bluish-colored lips or face    How can I take care of myself?  1. Get lots of rest. Drink extra fluids (unless a doctor has told you not to).  2. Take Tylenol (acetaminophen) for fever or pain. If you have liver or kidney problems, ask your family doctor if it's okay to take Tylenol   Adults:   650 mg (two 325 mg pills or tablets) every 4 to 6 hours,  or...   1,000 mg (two 500 mg pills or tablets) every 8 hours as needed.  Note: Don't take more than 3,000 mg in one day. Acetaminophen is found in many medicines (both prescribed and over the counter). Read all labels to be sure you don't take too much.  For children, check the Tylenol bottle for the right dose. The dose is based on the child's age or weight.  3. Take over the counter medicines for your symptoms as needed. Talk to your pharmacist.  4. If you have other health problems (like cancer, heart failure, an organ transplant, or severe kidney disease): Call your specialty clinic if you don't feel better in the next 2 days.    Where can I get more information?     [facility] COVID-19 Resource Hub: [email or web address]     CDC Quarantine & Isolation: https://www.cdc.gov/coronavirus/2019-ncov/your-health/quarantine-isolation.html     CDC - What to Do If You're Sick: https://www.cdc.gov/coronavirus/2019-ncov/if-you-are-sick/index.html    [facility] clinical trials (COVID-19 research studies): clinicalaffairs.[facility]/umn-clinical-trials    [place] Department of Health COVID-19 Public Hotline: 1-961.496.2055

## 2022-10-14 NOTE — TELEPHONE ENCOUNTER
Last Written Prescription Date:  10/4/21  Last Fill Quantity: 90,  # refills: 3   Last office visit provider:  8/2/22 with Dr. Caputo, follow up in 6 months    Requested Prescriptions   Pending Prescriptions Disp Refills     anastrozole (ARIMIDEX) 1 MG tablet [Pharmacy Med Name: Anastrozole Oral Tablet 1 MG] 90 tablet 0     Sig: TAKE ONE TABLET BY MOUTH ONE TIME DAILY       There is no refill protocol information for this order          Shante George RN 10/14/22 10:44 AM

## 2022-10-14 NOTE — TELEPHONE ENCOUNTER
"Routing refill request to provider for review/approval because:  Labs not current:  ldl    Last Written Prescription Date:  8/8/22  Last Fill Quantity: 90,  # refills: 0   Last office visit provider:  6/6/22     Requested Prescriptions   Pending Prescriptions Disp Refills     losartan (COZAAR) 25 MG tablet [Pharmacy Med Name: Losartan Potassium Oral Tablet 25 MG] 90 tablet 0     Sig: TAKE ONE TABLET BY MOUTH ONE TIME DAILY       Angiotensin-II Receptors Passed - 10/14/2022 10:41 AM        Passed - Last blood pressure under 140/90 in past 12 months     BP Readings from Last 3 Encounters:   08/02/22 125/71   06/06/22 120/56   02/14/22 (!) 140/80                 Passed - Recent (12 mo) or future (30 days) visit within the authorizing provider's specialty     Patient has had an office visit with the authorizing provider or a provider within the authorizing providers department within the previous 12 mos or has a future within next 30 days. See \"Patient Info\" tab in inbasket, or \"Choose Columns\" in Meds & Orders section of the refill encounter.              Passed - Medication is active on med list        Passed - Patient is age 18 or older        Passed - No active pregnancy on record        Passed - Normal serum creatinine on file in past 12 months     Recent Labs   Lab Test 02/14/22  1502   CR 0.85       Ok to refill medication if creatinine is low          Passed - Normal serum potassium on file in past 12 months     Recent Labs   Lab Test 02/14/22  1502   POTASSIUM 4.1                    Passed - No positive pregnancy test in past 12 months           atorvastatin (LIPITOR) 20 MG tablet [Pharmacy Med Name: Atorvastatin Calcium Oral Tablet 20 MG] 90 tablet 0     Sig: TAKE ONE TABLET BY MOUTH AT BEDTIME       Statins Protocol Failed - 10/14/2022 10:41 AM        Failed - LDL on file in past 12 months     Recent Labs   Lab Test 05/17/21  1152   LDL 77             Passed - No abnormal creatine kinase in past 12 months     " "No lab results found.             Passed - Recent (12 mo) or future (30 days) visit within the authorizing provider's specialty     Patient has had an office visit with the authorizing provider or a provider within the authorizing providers department within the previous 12 mos or has a future within next 30 days. See \"Patient Info\" tab in inbasket, or \"Choose Columns\" in Meds & Orders section of the refill encounter.              Passed - Medication is active on med list        Passed - Patient is age 18 or older        Passed - No active pregnancy on record        Passed - No positive pregnancy test in past 12 months           citalopram (CELEXA) 20 MG tablet [Pharmacy Med Name: Citalopram Hydrobromide Oral Tablet 20 MG] 90 tablet 0     Sig: TAKE ONE TABLET BY MOUTH ONE TIME DAILY       SSRIs Protocol Passed - 10/14/2022 10:41 AM        Passed - PHQ-9 score less than 5 in past 6 months     Please review last PHQ-9 score.           Passed - Medication is active on med list        Passed - Patient is age 18 or older        Passed - No active pregnancy on record        Passed - No positive pregnancy test in last 12 months        Passed - Recent (6 mo) or future (30 days) visit within the authorizing provider's specialty     Patient had office visit in the last 6 months or has a visit in the next 30 days with authorizing provider or within the authorizing provider's specialty.  See \"Patient Info\" tab in inbasket, or \"Choose Columns\" in Meds & Orders section of the refill encounter.                 Niurka Smiley RN 10/14/22 2:01 PM  "

## 2022-10-15 RX ORDER — ANASTROZOLE 1 MG/1
TABLET ORAL
Qty: 90 TABLET | Refills: 3 | Status: SHIPPED | OUTPATIENT
Start: 2022-10-15 | End: 2023-03-03

## 2023-02-03 DIAGNOSIS — I10 ESSENTIAL HYPERTENSION: ICD-10-CM

## 2023-02-03 RX ORDER — HYDROCHLOROTHIAZIDE 25 MG/1
TABLET ORAL
Qty: 90 TABLET | Refills: 2 | Status: SHIPPED | OUTPATIENT
Start: 2023-02-03 | End: 2023-03-03

## 2023-02-03 NOTE — TELEPHONE ENCOUNTER
"Last Written Prescription Date:  8/5/2022  Last Fill Quantity: 90,  # refills: 1   Last office visit provider:  6/6/2022     Requested Prescriptions   Pending Prescriptions Disp Refills     hydrochlorothiazide (HYDRODIURIL) 25 MG tablet [Pharmacy Med Name: hydroCHLOROthiazide Oral Tablet 25 MG] 90 tablet 0     Sig: TAKE ONE TABLET BY MOUTH ONE TIME DAILY       Diuretics (Including Combos) Protocol Passed - 2/3/2023  2:00 AM        Passed - Blood pressure under 140/90 in past 12 months     BP Readings from Last 3 Encounters:   08/02/22 125/71   06/06/22 120/56   02/14/22 (!) 140/80                 Passed - Recent (12 mo) or future (30 days) visit within the authorizing provider's specialty     Patient has had an office visit with the authorizing provider or a provider within the authorizing providers department within the previous 12 mos or has a future within next 30 days. See \"Patient Info\" tab in inbasket, or \"Choose Columns\" in Meds & Orders section of the refill encounter.              Passed - Medication is active on med list        Passed - Patient is age 18 or older        Passed - No active pregancy on record        Passed - Normal serum creatinine on file in past 12 months     Recent Labs   Lab Test 02/14/22  1502   CR 0.85              Passed - Normal serum potassium on file in past 12 months     Recent Labs   Lab Test 02/14/22  1502   POTASSIUM 4.1                    Passed - Normal serum sodium on file in past 12 months     Recent Labs   Lab Test 02/14/22  1502                 Passed - No positive pregnancy test in past 12 months             Olena Funez RN 02/03/23 3:17 PM      "

## 2023-02-28 DIAGNOSIS — I10 ESSENTIAL HYPERTENSION: ICD-10-CM

## 2023-02-28 RX ORDER — LOSARTAN POTASSIUM 25 MG/1
25 TABLET ORAL DAILY
Qty: 30 TABLET | Refills: 0 | Status: SHIPPED | OUTPATIENT
Start: 2023-02-28 | End: 2023-03-03

## 2023-03-03 ENCOUNTER — OFFICE VISIT (OUTPATIENT)
Dept: FAMILY MEDICINE | Facility: CLINIC | Age: 78
End: 2023-03-03
Payer: COMMERCIAL

## 2023-03-03 VITALS
HEIGHT: 59 IN | SYSTOLIC BLOOD PRESSURE: 122 MMHG | HEART RATE: 76 BPM | DIASTOLIC BLOOD PRESSURE: 68 MMHG | OXYGEN SATURATION: 95 % | WEIGHT: 146 LBS | BODY MASS INDEX: 29.43 KG/M2

## 2023-03-03 DIAGNOSIS — I10 ESSENTIAL HYPERTENSION: ICD-10-CM

## 2023-03-03 DIAGNOSIS — E78.5 DYSLIPIDEMIA: ICD-10-CM

## 2023-03-03 DIAGNOSIS — C50.912 INVASIVE DUCTAL CARCINOMA OF BREAST, STAGE 1, LEFT (H): ICD-10-CM

## 2023-03-03 DIAGNOSIS — C50.912 INVASIVE DUCTAL CARCINOMA OF BREAST, LEFT (H): ICD-10-CM

## 2023-03-03 DIAGNOSIS — F32.5 MAJOR DEPRESSION IN REMISSION (H): ICD-10-CM

## 2023-03-03 DIAGNOSIS — R10.32 ABDOMINAL PAIN, LEFT LOWER QUADRANT: Primary | ICD-10-CM

## 2023-03-03 DIAGNOSIS — K57.30 DIVERTICULAR DISEASE OF COLON: ICD-10-CM

## 2023-03-03 LAB
ALBUMIN SERPL BCG-MCNC: 4.6 G/DL (ref 3.5–5.2)
ALBUMIN UR-MCNC: NEGATIVE MG/DL
ALP SERPL-CCNC: 70 U/L (ref 35–104)
ALT SERPL W P-5'-P-CCNC: 20 U/L (ref 10–35)
ANION GAP SERPL CALCULATED.3IONS-SCNC: 13 MMOL/L (ref 7–15)
APPEARANCE UR: CLEAR
AST SERPL W P-5'-P-CCNC: 27 U/L (ref 10–35)
BILIRUB SERPL-MCNC: 0.7 MG/DL
BILIRUB UR QL STRIP: NEGATIVE
BUN SERPL-MCNC: 13.2 MG/DL (ref 8–23)
CALCIUM SERPL-MCNC: 10 MG/DL (ref 8.8–10.2)
CHLORIDE SERPL-SCNC: 99 MMOL/L (ref 98–107)
COLOR UR AUTO: YELLOW
CREAT SERPL-MCNC: 0.76 MG/DL (ref 0.51–0.95)
DEPRECATED HCO3 PLAS-SCNC: 29 MMOL/L (ref 22–29)
ERYTHROCYTE [DISTWIDTH] IN BLOOD BY AUTOMATED COUNT: 13.2 % (ref 10–15)
GFR SERPL CREATININE-BSD FRML MDRD: 80 ML/MIN/1.73M2
GLUCOSE SERPL-MCNC: 94 MG/DL (ref 70–99)
GLUCOSE UR STRIP-MCNC: NEGATIVE MG/DL
HCT VFR BLD AUTO: 42.1 % (ref 35–47)
HGB BLD-MCNC: 15.1 G/DL (ref 11.7–15.7)
HGB UR QL STRIP: ABNORMAL
KETONES UR STRIP-MCNC: NEGATIVE MG/DL
LEUKOCYTE ESTERASE UR QL STRIP: NEGATIVE
MCH RBC QN AUTO: 33.2 PG (ref 26.5–33)
MCHC RBC AUTO-ENTMCNC: 35.9 G/DL (ref 31.5–36.5)
MCV RBC AUTO: 93 FL (ref 78–100)
NITRATE UR QL: NEGATIVE
PH UR STRIP: 7.5 [PH] (ref 5–8)
PLATELET # BLD AUTO: 259 10E3/UL (ref 150–450)
POTASSIUM SERPL-SCNC: 4 MMOL/L (ref 3.4–5.3)
PROT SERPL-MCNC: 7.8 G/DL (ref 6.4–8.3)
RBC # BLD AUTO: 4.55 10E6/UL (ref 3.8–5.2)
RBC #/AREA URNS AUTO: ABNORMAL /HPF
SODIUM SERPL-SCNC: 141 MMOL/L (ref 136–145)
SP GR UR STRIP: 1.01 (ref 1–1.03)
SQUAMOUS #/AREA URNS AUTO: ABNORMAL /LPF
UROBILINOGEN UR STRIP-ACNC: 0.2 E.U./DL
WBC # BLD AUTO: 4.8 10E3/UL (ref 4–11)
WBC #/AREA URNS AUTO: ABNORMAL /HPF

## 2023-03-03 PROCEDURE — 90715 TDAP VACCINE 7 YRS/> IM: CPT | Performed by: PHYSICIAN ASSISTANT

## 2023-03-03 PROCEDURE — 80053 COMPREHEN METABOLIC PANEL: CPT | Performed by: PHYSICIAN ASSISTANT

## 2023-03-03 PROCEDURE — 99214 OFFICE O/P EST MOD 30 MIN: CPT | Mod: 25 | Performed by: PHYSICIAN ASSISTANT

## 2023-03-03 PROCEDURE — 81001 URINALYSIS AUTO W/SCOPE: CPT | Performed by: PHYSICIAN ASSISTANT

## 2023-03-03 PROCEDURE — 90471 IMMUNIZATION ADMIN: CPT | Performed by: PHYSICIAN ASSISTANT

## 2023-03-03 PROCEDURE — 85027 COMPLETE CBC AUTOMATED: CPT | Performed by: PHYSICIAN ASSISTANT

## 2023-03-03 PROCEDURE — 36415 COLL VENOUS BLD VENIPUNCTURE: CPT | Performed by: PHYSICIAN ASSISTANT

## 2023-03-03 RX ORDER — ANASTROZOLE 1 MG/1
1 TABLET ORAL DAILY
Qty: 90 TABLET | Refills: 0 | Status: SHIPPED | OUTPATIENT
Start: 2023-03-03 | End: 2023-11-24

## 2023-03-03 RX ORDER — LOSARTAN POTASSIUM 25 MG/1
25 TABLET ORAL DAILY
Qty: 90 TABLET | Refills: 3 | Status: SHIPPED | OUTPATIENT
Start: 2023-03-03 | End: 2024-04-11

## 2023-03-03 RX ORDER — CITALOPRAM HYDROBROMIDE 20 MG/1
20 TABLET ORAL DAILY
Qty: 90 TABLET | Refills: 3 | Status: SHIPPED | OUTPATIENT
Start: 2023-03-03 | End: 2024-04-10

## 2023-03-03 RX ORDER — ATORVASTATIN CALCIUM 20 MG/1
20 TABLET, FILM COATED ORAL DAILY
Qty: 90 TABLET | Refills: 0 | Status: SHIPPED | OUTPATIENT
Start: 2023-03-03 | End: 2023-06-15

## 2023-03-03 RX ORDER — HYDROCHLOROTHIAZIDE 25 MG/1
25 TABLET ORAL DAILY
Qty: 90 TABLET | Refills: 3 | Status: SHIPPED | OUTPATIENT
Start: 2023-03-03 | End: 2024-05-29

## 2023-03-03 ASSESSMENT — PATIENT HEALTH QUESTIONNAIRE - PHQ9
SUM OF ALL RESPONSES TO PHQ QUESTIONS 1-9: 0
SUM OF ALL RESPONSES TO PHQ QUESTIONS 1-9: 0

## 2023-03-03 NOTE — PROGRESS NOTES
"  Assessment & Plan   Problem List Items Addressed This Visit        Circulatory    Essential hypertension     Well controlled on losartan 25 mg, hydrochlorothiazide 25 mg         Relevant Medications    losartan (COZAAR) 25 MG tablet    hydrochlorothiazide (HYDRODIURIL) 25 MG tablet    Other Relevant Orders    Comprehensive metabolic panel (BMP + Alb, Alk Phos, ALT, AST, Total. Bili, TP)    CBC with platelets (Completed)       Behavioral    Major depression in remission (H)     Well controlled         Relevant Medications    citalopram (CELEXA) 20 MG tablet       Other    Invasive ductal carcinoma of breast, left (H)   Other Visit Diagnoses     Abdominal pain, left lower quadrant    -  Primary    Relevant Orders    UA macro with reflex to Microscopic and Culture - Clinc Collect (Completed)    UA Microscopic with Reflex to Culture (Completed)    Dyslipidemia        Relevant Medications    atorvastatin (LIPITOR) 20 MG tablet    Invasive ductal carcinoma of breast, stage 1, left (H)        Relevant Medications    anastrozole (ARIMIDEX) 1 MG tablet    Diverticular disease of colon             Mild abdominal pain left lower quadrant possibly due to early diverticulitis versus muscular vs other  await CMP patient will eat low fiber diet without nuts or popcorn or seeds and follow-up in 2 weeks if symptoms persist sooner with any new or worsening symptoms consider CT with worsening or persistent symptoms      Review of prior external note(s) from - epic         BMI:   Estimated body mass index is 29.49 kg/m  as calculated from the following:    Height as of this encounter: 1.499 m (4' 11\").    Weight as of this encounter: 66.2 kg (146 lb).   Weight management plan: not addressed today        Return if symptoms worsen or fail to improve.    Tammi Butterfield PA-C  [facility]   Ariana is a 77 year old, presenting for the following health issues:  Establish Care, Medication Request, " "and Abdominal Pain (Sharp pain left side stomach x 1 month)      History of Present Illness       Reason for visit:  Establish a primary doctor  Symptom intensity:  Mild  Symptom progression:  Staying the same  Had these symptoms before:  Yes  Has tried/received treatment for these symptoms:  No    She eats 2-3 servings of fruits and vegetables daily.She consumes 0 sweetened beverage(s) daily.She exercises with enough effort to increase her heart rate 9 or less minutes per day.  She exercises with enough effort to increase her heart rate 3 or less days per week.   She is taking medications regularly.    Today's PHQ-9         PHQ-9 Total Score: 0    PHQ-9 Q9 Thoughts of better off dead/self-harm past 2 weeks :   Not at all         Mild left sided abdominal pain when she leans to the left, relieved by sitting upright.  She has tried to eat more fiber which has helped the pain but hasn't made it go away.  Denies fevers, chills, denies new body aches.  Denies n/v/d, has been constipated the past couple of days.  Notes h/o diverticula in this area, does eat nuts and popcorn.  Denies changes in appetite.  Does have h/o breast cancer and has appt with oncology in April.          Review of Systems   Constitutional, HEENT, cardiovascular, pulmonary, gi and gu systems are negative, except as otherwise noted.      Objective    /68 (BP Location: Right arm, Patient Position: Sitting, Cuff Size: Adult Regular)   Pulse 76   Ht 1.499 m (4' 11\")   Wt 66.2 kg (146 lb)   SpO2 95%   BMI 29.49 kg/m    Body mass index is 29.49 kg/m .  Physical Exam   GENERAL: healthy, alert and no distress  NECK: no adenopathy, no asymmetry, masses, or scars and thyroid normal to palpation  RESP: lungs clear to auscultation - no rales, rhonchi or wheezes  CV: regular rate and rhythm, normal S1 S2,  no peripheral edema and peripheral pulses strong  ABDOMEN: soft, mild ttp left lower quadrant, no hepatosplenomegaly, no masses and bowel sounds " normal  MS: no gross musculoskeletal defects noted, no edema    Recent Results (from the past 24 hour(s))   CBC with platelets    Collection Time: 03/03/23  1:42 PM   Result Value Ref Range    WBC Count 4.8 4.0 - 11.0 10e3/uL    RBC Count 4.55 3.80 - 5.20 10e6/uL    Hemoglobin 15.1 11.7 - 15.7 g/dL    Hematocrit 42.1 35.0 - 47.0 %    MCV 93 78 - 100 fL    MCH 33.2 (H) 26.5 - 33.0 pg    MCHC 35.9 31.5 - 36.5 g/dL    RDW 13.2 10.0 - 15.0 %    Platelet Count 259 150 - 450 10e3/uL   UA macro with reflex to Microscopic and Culture - Clinc Collect    Collection Time: 03/03/23  1:58 PM    Specimen: Urine, Clean Catch   Result Value Ref Range    Color Urine Yellow Colorless, Straw, Light Yellow, Yellow    Appearance Urine Clear Clear    Glucose Urine Negative Negative mg/dL    Bilirubin Urine Negative Negative    Ketones Urine Negative Negative mg/dL    Specific Gravity Urine 1.015 1.005 - 1.030    Blood Urine Small (A) Negative    pH Urine 7.5 5.0 - 8.0    Protein Albumin Urine Negative Negative mg/dL    Urobilinogen Urine 0.2 0.2, 1.0 E.U./dL    Nitrite Urine Negative Negative    Leukocyte Esterase Urine Negative Negative   UA Microscopic with Reflex to Culture    Collection Time: 03/03/23  1:58 PM   Result Value Ref Range    RBC Urine 0-2 0-2 /HPF /HPF    WBC Urine 0-5 0-5 /HPF /HPF    Squamous Epithelials Urine Few (A) None Seen /LPF

## 2023-04-03 ENCOUNTER — ONCOLOGY VISIT (OUTPATIENT)
Dept: ONCOLOGY | Facility: CLINIC | Age: 78
End: 2023-04-03
Attending: INTERNAL MEDICINE
Payer: COMMERCIAL

## 2023-04-03 VITALS
WEIGHT: 147 LBS | DIASTOLIC BLOOD PRESSURE: 82 MMHG | HEIGHT: 59 IN | BODY MASS INDEX: 29.64 KG/M2 | RESPIRATION RATE: 16 BRPM | SYSTOLIC BLOOD PRESSURE: 146 MMHG | OXYGEN SATURATION: 98 % | HEART RATE: 72 BPM

## 2023-04-03 DIAGNOSIS — C50.912 INVASIVE DUCTAL CARCINOMA OF BREAST, LEFT (H): Primary | ICD-10-CM

## 2023-04-03 PROCEDURE — 99213 OFFICE O/P EST LOW 20 MIN: CPT | Performed by: INTERNAL MEDICINE

## 2023-04-03 PROCEDURE — G0463 HOSPITAL OUTPT CLINIC VISIT: HCPCS | Performed by: INTERNAL MEDICINE

## 2023-04-03 RX ORDER — KETOCONAZOLE 20 MG/ML
SHAMPOO TOPICAL
COMMUNITY
Start: 2023-03-23 | End: 2024-08-26

## 2023-04-03 ASSESSMENT — PAIN SCALES - GENERAL: PAINLEVEL: NO PAIN (0)

## 2023-04-03 NOTE — PROGRESS NOTES
"Oncology Rooming Note    April 3, 2023 3:05 PM   Ariana Hodge is a 77 year old female who presents for:    Chief Complaint   Patient presents with     Oncology Clinic Visit     Invasive ductal carcinoma of breast, left     Initial Vitals: BP (!) 146/82   Pulse 72   Resp 16   Ht 1.499 m (4' 11\")   Wt 66.7 kg (147 lb)   SpO2 98%   BMI 29.69 kg/m   Estimated body mass index is 29.69 kg/m  as calculated from the following:    Height as of this encounter: 1.499 m (4' 11\").    Weight as of this encounter: 66.7 kg (147 lb). Body surface area is 1.67 meters squared.  No Pain (0) Comment: Data Unavailable   No LMP recorded. Patient is postmenopausal.  Allergies reviewed: Yes  Medications reviewed: Yes    Medications: Medication refills not needed today.  Pharmacy name entered into [facility]: [id number] [place], [place] - [address]    Clinical concerns:  6 month follow up      Iris Obrien            "

## 2023-04-03 NOTE — Clinical Note
"    4/3/2023         RE: Ariana Hodge  [address] [place] [address]        Dear Colleague,    Thank you for referring your patient, Ariana Hodge, to the [facility]. Please see a copy of my visit note below.    Oncology Rooming Note    April 3, 2023 3:05 PM   Ariana Hodge is a 77 year old female who presents for:    Chief Complaint   Patient presents with     Oncology Clinic Visit     Invasive ductal carcinoma of breast, left     Initial Vitals: BP (!) 146/82   Pulse 72   Resp 16   Ht 1.499 m (4' 11\")   Wt 66.7 kg (147 lb)   SpO2 98%   BMI 29.69 kg/m   Estimated body mass index is 29.69 kg/m  as calculated from the following:    Height as of this encounter: 1.499 m (4' 11\").    Weight as of this encounter: 66.7 kg (147 lb). Body surface area is 1.67 meters squared.  No Pain (0) Comment: Data Unavailable   No LMP recorded. Patient is postmenopausal.  Allergies reviewed: Yes  Medications reviewed: Yes    Medications: Medication refills not needed today.  Pharmacy name entered into [facility]: [id number] - [place], [place] - [address]    Clinical concerns:  6 month follow up      Iris Obrien                Again, thank you for allowing me to participate in the care of your patient.        Sincerely,        Tommy Caputo MD  "

## 2023-05-12 ENCOUNTER — OFFICE VISIT (OUTPATIENT)
Dept: FAMILY MEDICINE | Facility: CLINIC | Age: 78
End: 2023-05-12
Payer: COMMERCIAL

## 2023-05-12 VITALS
BODY MASS INDEX: 29.49 KG/M2 | SYSTOLIC BLOOD PRESSURE: 170 MMHG | OXYGEN SATURATION: 97 % | DIASTOLIC BLOOD PRESSURE: 78 MMHG | HEART RATE: 67 BPM | HEIGHT: 59 IN | WEIGHT: 146.3 LBS | TEMPERATURE: 97.8 F

## 2023-05-12 DIAGNOSIS — N93.9 VAGINAL SPOTTING: ICD-10-CM

## 2023-05-12 DIAGNOSIS — M79.644 PAIN OF RIGHT THUMB: ICD-10-CM

## 2023-05-12 DIAGNOSIS — Z85.3 HISTORY OF BREAST CANCER IN ADULTHOOD: ICD-10-CM

## 2023-05-12 DIAGNOSIS — R10.32 LLQ ABDOMINAL PAIN: ICD-10-CM

## 2023-05-12 DIAGNOSIS — N95.0 POST-MENOPAUSE BLEEDING: ICD-10-CM

## 2023-05-12 DIAGNOSIS — K30 UPSET STOMACH: Primary | ICD-10-CM

## 2023-05-12 PROCEDURE — 99214 OFFICE O/P EST MOD 30 MIN: CPT | Performed by: NURSE PRACTITIONER

## 2023-05-12 RX ORDER — KETOCONAZOLE 20 MG/ML
SHAMPOO TOPICAL
Status: CANCELLED | OUTPATIENT
Start: 2023-05-12

## 2023-05-12 RX ORDER — MAGNESIUM HYDROXIDE/ALUMINUM HYDROXICE/SIMETHICONE 120; 1200; 1200 MG/30ML; MG/30ML; MG/30ML
20 SUSPENSION ORAL ONCE
Status: COMPLETED | OUTPATIENT
Start: 2023-05-12 | End: 2023-05-18

## 2023-05-12 NOTE — PROGRESS NOTES
"  Assessment & Plan     Upset stomach    - alum & mag hydroxide-simethicone (MAALOX) suspension 20 mL    Pain of right thumb  - Orthopedic  Referral    LLQ abdominal pain    Vaginal spotting      Post-menopause bleeding      History of breast cancer in adulthood      - I had patient lie down on her side and took MOM. Her symptoms slowly got better, leaning towards gas pains from holding her stool. No extreme warning signs today to indicate ER or emergent imaging. She vaguely mentioned her vaginal spotting while discussing her abdomen symptoms. CT ordered vs US since pain to abdomen appears chronic, and history of breast cancer. ?diverticulitis, mass?. I told her to drink mostly liquids today and bland diet until her symptoms are better. Warning signs discussed and when to seek the ED.   - BP up today, but normal at last visit. Situational. Continue to monitor   - Ortho referral of CMC pain that is worsening. Conservative management discussed.                  TUSHAR Clark CNP  [facility]   Ariana is a 77 year old, presenting for the following health issues:  Hand Pain (Concerns about arthritis in right hand. Swelling and pain, inability to bend thumb) and Gastric Problem (Diverticulitis, worsening stomach pains, previously saw Tammi Butterfield earlier this year to discuss this issue)    She felt her stomach was okay today, but on the way here to the office, she had to go to the bathroom but had to hold it related to driving. She now has LLQ pain. Denied vomiting, excess weight loss, or fevers. She is trying to work on her diet. She feels like the pain radiates to her rectum, this is new to her. Denied GERD. Denied urinary changes. Denied vaginal pains, but did note \"vaginal\" spotting about one month ago. She is certain it came from her vagina, not her rectum or urethra.         5/12/2023     2:53 PM   Additional Questions   Roomed by Area F     Hand " "Pain  Associated symptoms include abdominal pain and arthralgias. Pertinent negatives include no chills, diaphoresis, fatigue, fever, nausea or vomiting.   Gastric Problem  Associated symptoms include abdominal pain and arthralgias. Pertinent negatives include no chills, diaphoresis, fatigue, fever, nausea or vomiting.   History of Present Illness       Reason for visit:  Arthritis  Symptom onset:  Today                Review of Systems   Constitutional: Negative for chills, diaphoresis, fatigue, fever and unexpected weight change.   Eyes: Negative.    Respiratory: Negative.    Cardiovascular: Negative.    Gastrointestinal: Positive for abdominal pain and rectal pain. Negative for abdominal distention, anal bleeding, constipation, diarrhea, heartburn, hematochezia, nausea and vomiting.   Endocrine: Negative.    Genitourinary: Negative.    Musculoskeletal: Positive for arthralgias.        Thumb CMC joint pain   Skin: Negative.    Neurological: Negative.    Psychiatric/Behavioral: Negative for self-injury and suicidal ideas.            Objective    BP (!) 170/78 (BP Location: Left arm, Patient Position: Sitting, Cuff Size: Adult Regular)   Pulse 67   Temp 97.8  F (36.6  C)   Ht 1.499 m (4' 11.02\")   Wt 66.4 kg (146 lb 4.8 oz)   SpO2 97%   BMI 29.53 kg/m    Body mass index is 29.53 kg/m .  Physical Exam  Constitutional:       General: She is in acute distress.      Appearance: She is normal weight. She is not ill-appearing, toxic-appearing or diaphoretic.   Abdominal:      General: There is distension.      Palpations: Abdomen is soft. There is no fluid wave, hepatomegaly, splenomegaly or mass.      Tenderness: There is abdominal tenderness in the left lower quadrant. There is guarding. There is no right CVA tenderness, left CVA tenderness or rebound. Negative signs include Mcnally's sign, Rovsing's sign, McBurney's sign, psoas sign and obturator sign.      Hernia: No hernia is present.      Comments: Appears " round, bloated   Musculoskeletal:      Right hand: Normal.      Left hand: Normal.      Comments: Left CMC pain; ROM intact   Neurological:      Mental Status: She is alert.            Office Visit on 03/03/2023   Component Date Value Ref Range Status     Color Urine 03/03/2023 Yellow  Colorless, Straw, Light Yellow, Yellow Final     Appearance Urine 03/03/2023 Clear  Clear Final     Glucose Urine 03/03/2023 Negative  Negative mg/dL Final     Bilirubin Urine 03/03/2023 Negative  Negative Final     Ketones Urine 03/03/2023 Negative  Negative mg/dL Final     Specific Gravity Urine 03/03/2023 1.015  1.005 - 1.030 Final     Blood Urine 03/03/2023 Small (A)  Negative Final     pH Urine 03/03/2023 7.5  5.0 - 8.0 Final     Protein Albumin Urine 03/03/2023 Negative  Negative mg/dL Final     Urobilinogen Urine 03/03/2023 0.2  0.2, 1.0 E.U./dL Final     Nitrite Urine 03/03/2023 Negative  Negative Final     Leukocyte Esterase Urine 03/03/2023 Negative  Negative Final     Sodium 03/03/2023 141  136 - 145 mmol/L Final     Potassium 03/03/2023 4.0  3.4 - 5.3 mmol/L Final     Chloride 03/03/2023 99  98 - 107 mmol/L Final     Carbon Dioxide (CO2) 03/03/2023 29  22 - 29 mmol/L Final     Anion Gap 03/03/2023 13  7 - 15 mmol/L Final     Urea Nitrogen 03/03/2023 13.2  8.0 - 23.0 mg/dL Final     Creatinine 03/03/2023 0.76  0.51 - 0.95 mg/dL Final     Calcium 03/03/2023 10.0  8.8 - 10.2 mg/dL Final     Glucose 03/03/2023 94  70 - 99 mg/dL Final     Alkaline Phosphatase 03/03/2023 70  35 - 104 U/L Final     AST 03/03/2023 27  10 - 35 U/L Final     ALT 03/03/2023 20  10 - 35 U/L Final     Protein Total 03/03/2023 7.8  6.4 - 8.3 g/dL Final     Albumin 03/03/2023 4.6  3.5 - 5.2 g/dL Final     Bilirubin Total 03/03/2023 0.7  <=1.2 mg/dL Final     GFR Estimate 03/03/2023 80  >60 mL/min/1.73m2 Final    eGFR calculated using 2021 CKD-EPI equation.     WBC Count 03/03/2023 4.8  4.0 - 11.0 10e3/uL Final     RBC Count 03/03/2023 4.55  3.80 - 5.20  10e6/uL Final     Hemoglobin 03/03/2023 15.1  11.7 - 15.7 g/dL Final     Hematocrit 03/03/2023 42.1  35.0 - 47.0 % Final     MCV 03/03/2023 93  78 - 100 fL Final     MCH 03/03/2023 33.2 (H)  26.5 - 33.0 pg Final     MCHC 03/03/2023 35.9  31.5 - 36.5 g/dL Final     RDW 03/03/2023 13.2  10.0 - 15.0 % Final     Platelet Count 03/03/2023 259  150 - 450 10e3/uL Final     RBC Urine 03/03/2023 0-2  0-2 /HPF /HPF Final     WBC Urine 03/03/2023 0-5  0-5 /HPF /HPF Final     Squamous Epithelials Urine 03/03/2023 Few (A)  None Seen /LPF Final

## 2023-05-17 NOTE — PROGRESS NOTES
[facility] Hematology and Oncology Progress Note    Patient: Ariana Hodge  MRN: [id number]  Date of Service: Apr 3, 2023        Assessment and Plan:    1. Invasive ductal carcinoma: She continues on Arimidex.  Continues to tolerate it well.  She gets mammograms every July.  She had a bone density test year ago which was normal.  We can repeat in April 2024.  She had labs drawn on March 3 of this year.  I personally reviewed these results which show a normal white count of 4.8, hemoglobin 15.1, and platelets of 259,000.  CMP was reviewed and the alkaline phosphatase is 70 calcium was 10.0.  We will have her return to clinic in about 10 months.  Continue calcium and vitamin D supplementation.    ECOG Performance  0    Diagnosis:    1. Invasive ductal carcinoma of the left breast: Diagnosed August 2021. Upper inner quadrant. Grade 2, ER/FL positive, HER-2/josh negative. Tumor size was 15 x 13 x 8 mm. 2 sentinel lymph nodes were negative.  Oncotype recurrence score is 27 (16%).    Treatment:    Breast conservation surgery performed on September 7, 2021.  Arimidex started mid October 2021.    Interim History:    Ariana returns today for follow-up visit.  She was last seen in clinic about a month ago.  In the interim she has been doing okay.  She has no acute complaints today.  She is not having any significant musculoskeletal or vasomotor symptoms.    Review of Systems:    As above in the history.     Review of Systems otherwise Negative for:  General: chills, fever or night sweats  Psychological: anxiety or depression  Ophthalmic: blurry vision, double vision or loss of vision, vision change  ENT: epistaxis, oral lesions, hearing changes  Hematological and Lymphatic: bleeding, bruising, jaundice, swollen lymph nodes  Endocrine: hot flashes, unexpected weight changes  Respiratory: cough, hemoptysis, orthopnea or shortness of breath/CHU  Cardiovascular: chest pain, edema, palpitations or PND  Gastrointestinal:  "abdominal pain, blood in stools, change in bowel habits, constipation, diarrhea or nausea/vomiting  Genito-Urinary: change in urinary stream, incontinence, frequency/urgency  Musculoskeletal: joint pain, stiffness, swelling, muscle pain  Neurological: dizziness, headaches, numbness/tingling  Dermatological: lumps and rash    Past History:    Past Medical History:   Diagnosis Date     CHF (congestive heart failure) (H) 2/26/2020     Gastroesophageal reflux disease      Hypertension      Inverted nipple     both breasts ever since she had her breast reduction surgery     Reflux esophagitis      Skin cancer      Physical Exam:    BP (!) 146/82   Pulse 72   Resp 16   Ht 1.499 m (4' 11\")   Wt 66.7 kg (147 lb)   SpO2 98%   BMI 29.69 kg/m      General: patient appears stated age of 76 year old. Nontoxic and in no distress.   HEENT: Head: atraumatic, normocephalic. Sclerae anicteric.  Chest:  Normal respiratory effort  Cardiac:  No edema.   Abdomen: abdomen is non-distended  Extremities: normal tone and muscle bulk.  Skin: no lesions or rash on visible skin. Warm and dry.   CNS: alert and oriented. Grossly non-focal.   Psychiatric: normal mood and affect.     Lab Results:    No results found for this or any previous visit (from the past 168 hour(s)).     Signed by: Tommy Caputo MD      "

## 2023-05-18 RX ADMIN — MAGNESIUM HYDROXIDE/ALUMINUM HYDROXICE/SIMETHICONE 20 ML: 120; 1200; 1200 SUSPENSION ORAL at 09:32

## 2023-05-18 ASSESSMENT — ENCOUNTER SYMPTOMS
CARDIOVASCULAR NEGATIVE: 1
FEVER: 0
ANAL BLEEDING: 0
ABDOMINAL DISTENTION: 0
ENDOCRINE NEGATIVE: 1
CHILLS: 0
RESPIRATORY NEGATIVE: 1
UNEXPECTED WEIGHT CHANGE: 0
CONSTIPATION: 0
HEARTBURN: 0
RECTAL PAIN: 1
DIARRHEA: 0
HEMATOCHEZIA: 0
EYES NEGATIVE: 1
DIAPHORESIS: 0
FATIGUE: 0
ABDOMINAL PAIN: 1
VOMITING: 0
NEUROLOGICAL NEGATIVE: 1
NAUSEA: 0
ARTHRALGIAS: 1

## 2023-05-19 ENCOUNTER — TRANSFERRED RECORDS (OUTPATIENT)
Dept: HEALTH INFORMATION MANAGEMENT | Facility: CLINIC | Age: 78
End: 2023-05-19
Payer: COMMERCIAL

## 2023-06-08 ENCOUNTER — TELEPHONE (OUTPATIENT)
Dept: FAMILY MEDICINE | Facility: CLINIC | Age: 78
End: 2023-06-08
Payer: COMMERCIAL

## 2023-06-08 DIAGNOSIS — R10.32 LLQ ABDOMINAL PAIN: ICD-10-CM

## 2023-06-08 DIAGNOSIS — N93.9 VAGINAL SPOTTING: Primary | ICD-10-CM

## 2023-06-08 DIAGNOSIS — N28.9 KIDNEY LESION: ICD-10-CM

## 2023-06-08 DIAGNOSIS — D73.4 CYST OF SPLEEN: ICD-10-CM

## 2023-06-08 NOTE — TELEPHONE ENCOUNTER
Patient came in clinic today because she saw Rach on 5/12 and was told that Rach would order a CT but an order was never placed. Per Rach's note from the visit:         Please place CT order for patient. Patient states she still isn't feeling any better.

## 2023-06-12 NOTE — TELEPHONE ENCOUNTER
Please tell her I am sorry about not placing the order.     Since she had vaginal spotting, I also put in a referral for OB/GYN.     TUSHAR Clark CNP

## 2023-06-12 NOTE — TELEPHONE ENCOUNTER
LMTCB. Please relay Rach's message below.    Also sent patient MyChart message including numbers to call for scheduling.

## 2023-06-14 DIAGNOSIS — E78.5 DYSLIPIDEMIA: ICD-10-CM

## 2023-06-15 RX ORDER — ATORVASTATIN CALCIUM 20 MG/1
20 TABLET, FILM COATED ORAL DAILY
Qty: 90 TABLET | Refills: 0 | Status: SHIPPED | OUTPATIENT
Start: 2023-06-15 | End: 2023-10-05

## 2023-06-15 NOTE — TELEPHONE ENCOUNTER
"Routing refill request to provider for review/approval because:  Labs not current:  ldl    Last Written Prescription Date:  3/3/23  Last Fill Quantity: 90,  # refills: 0   Last office visit provider:  3/3/23     Requested Prescriptions   Pending Prescriptions Disp Refills     atorvastatin (LIPITOR) 20 MG tablet 90 tablet 0     Sig: Take 1 tablet (20 mg) by mouth daily       Statins Protocol Failed - 6/14/2023  1:49 PM        Failed - LDL on file in past 12 months     Recent Labs   Lab Test 05/17/21  1152   LDL 77             Passed - No abnormal creatine kinase in past 12 months     No lab results found.             Passed - Recent (12 mo) or future (30 days) visit within the authorizing provider's specialty     Patient has had an office visit with the authorizing provider or a provider within the authorizing providers department within the previous 12 mos or has a future within next 30 days. See \"Patient Info\" tab in inbasket, or \"Choose Columns\" in Meds & Orders section of the refill encounter.              Passed - Medication is active on med list        Passed - Patient is age 18 or older        Passed - No active pregnancy on record        Passed - No positive pregnancy test in past 12 months             Niurka Smiley, RN 06/15/23 1:27 PM  "

## 2023-06-18 ENCOUNTER — HOSPITAL ENCOUNTER (OUTPATIENT)
Dept: CT IMAGING | Facility: CLINIC | Age: 78
Discharge: HOME | End: 2023-06-18
Attending: NURSE PRACTITIONER | Admitting: NURSE PRACTITIONER
Payer: COMMERCIAL

## 2023-06-18 DIAGNOSIS — N93.9 VAGINAL SPOTTING: ICD-10-CM

## 2023-06-18 DIAGNOSIS — R10.32 LLQ ABDOMINAL PAIN: ICD-10-CM

## 2023-06-18 LAB
CREAT BLD-MCNC: 1 MG/DL (ref 0.6–1.1)
GFR SERPL CREATININE-BSD FRML MDRD: 58 ML/MIN/1.73M2
RADIOLOGIST FLAGS: ABNORMAL

## 2023-06-18 PROCEDURE — 74177 CT ABD & PELVIS W/CONTRAST: CPT

## 2023-06-18 PROCEDURE — 82565 ASSAY OF CREATININE: CPT

## 2023-06-18 PROCEDURE — 250N000011 HC RX IP 250 OP 636: Performed by: NURSE PRACTITIONER

## 2023-06-18 RX ORDER — IOPAMIDOL 755 MG/ML
71 INJECTION, SOLUTION INTRAVASCULAR ONCE
Status: COMPLETED | OUTPATIENT
Start: 2023-06-18 | End: 2023-06-18

## 2023-06-18 RX ADMIN — IOPAMIDOL 71 ML: 755 INJECTION, SOLUTION INTRAVENOUS at 16:14

## 2023-06-19 ENCOUNTER — TELEPHONE (OUTPATIENT)
Dept: FAMILY MEDICINE | Facility: CLINIC | Age: 78
End: 2023-06-19
Payer: COMMERCIAL

## 2023-06-19 NOTE — TELEPHONE ENCOUNTER
Patient returned call, advised patient of Rach Hernandez's message, patient verbalized understanding.

## 2023-06-19 NOTE — TELEPHONE ENCOUNTER
I left a message for patient regarding her tests.     You can direct her to a nurse for results.     Her Ct of the Abdomen noted an unknown type nodule to her left kidney area. Her spleen also has new cyst, which may not be concerning. The radiologist recommended a MRI to further assess these areas to ensure they appear benign/normal.   Her bowel looked normal without any signs of infection.    I will order the MRI and they will call her. I also also order a follow up with Urology.     If she has questions, please let me know.     TUSHAR Clark CNP

## 2023-06-27 ENCOUNTER — HOSPITAL ENCOUNTER (OUTPATIENT)
Dept: MRI IMAGING | Facility: CLINIC | Age: 78
Discharge: HOME | End: 2023-06-27
Attending: NURSE PRACTITIONER | Admitting: NURSE PRACTITIONER
Payer: COMMERCIAL

## 2023-06-27 DIAGNOSIS — D73.4 CYST OF SPLEEN: ICD-10-CM

## 2023-06-27 DIAGNOSIS — R10.32 LLQ ABDOMINAL PAIN: ICD-10-CM

## 2023-06-27 DIAGNOSIS — N93.9 VAGINAL SPOTTING: ICD-10-CM

## 2023-06-27 DIAGNOSIS — N28.9 KIDNEY LESION: ICD-10-CM

## 2023-06-27 PROCEDURE — 255N000002 HC RX 255 OP 636: Mod: JZ | Performed by: NURSE PRACTITIONER

## 2023-06-27 PROCEDURE — 74183 MRI ABD W/O CNTR FLWD CNTR: CPT

## 2023-06-27 PROCEDURE — A9585 GADOBUTROL INJECTION: HCPCS | Mod: JZ | Performed by: NURSE PRACTITIONER

## 2023-06-27 RX ORDER — GADOBUTROL 604.72 MG/ML
6.5 INJECTION INTRAVENOUS ONCE
Status: COMPLETED | OUTPATIENT
Start: 2023-06-27 | End: 2023-06-27

## 2023-06-27 RX ADMIN — GADOBUTROL 6.5 ML: 604.72 INJECTION INTRAVENOUS at 16:36

## 2023-07-02 NOTE — RESULT ENCOUNTER NOTE
[address]    Good news!! The MRI is not showing anything alarming    1.  The queried left renal lesion is a benign 0.8 cm hemorrhagic/proteinaceous cyst, which does not require follow-up.     2.  No suspicious renal mass.     3.  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     Almita Hernandez

## 2023-08-03 ENCOUNTER — TELEPHONE (OUTPATIENT)
Dept: OBGYN | Facility: CLINIC | Age: 78
End: 2023-08-03

## 2023-08-03 DIAGNOSIS — N95.0 PMB (POSTMENOPAUSAL BLEEDING): Primary | ICD-10-CM

## 2023-08-03 NOTE — TELEPHONE ENCOUNTER
Pt has an appointment on 8/8 and is wondering if it is necessary. She has a CT and MRI done and found out there was a lesion on her kidney. She's said that the vaginal spotting was one time and fairly minor and doesn't want to waste Dr. Jon's time. Please call her back to discuss symptoms

## 2023-08-07 ENCOUNTER — HOSPITAL ENCOUNTER (OUTPATIENT)
Dept: MAMMOGRAPHY | Facility: CLINIC | Age: 78
Discharge: HOME | End: 2023-08-07
Attending: PHYSICIAN ASSISTANT | Admitting: PHYSICIAN ASSISTANT
Payer: COMMERCIAL

## 2023-08-07 DIAGNOSIS — Z12.31 VISIT FOR SCREENING MAMMOGRAM: ICD-10-CM

## 2023-08-07 PROCEDURE — 77067 SCR MAMMO BI INCL CAD: CPT

## 2023-08-08 ENCOUNTER — OFFICE VISIT (OUTPATIENT)
Dept: OBGYN | Facility: CLINIC | Age: 78
End: 2023-08-08
Payer: COMMERCIAL

## 2023-08-08 VITALS
HEART RATE: 80 BPM | BODY MASS INDEX: 29.64 KG/M2 | OXYGEN SATURATION: 98 % | WEIGHT: 147 LBS | DIASTOLIC BLOOD PRESSURE: 64 MMHG | HEIGHT: 59 IN | SYSTOLIC BLOOD PRESSURE: 122 MMHG

## 2023-08-08 DIAGNOSIS — R10.32 LLQ ABDOMINAL PAIN: ICD-10-CM

## 2023-08-08 DIAGNOSIS — N95.0 PMB (POSTMENOPAUSAL BLEEDING): ICD-10-CM

## 2023-08-08 PROCEDURE — 99203 OFFICE O/P NEW LOW 30 MIN: CPT | Performed by: OBSTETRICS & GYNECOLOGY

## 2023-08-08 ASSESSMENT — PATIENT HEALTH QUESTIONNAIRE - PHQ9: SUM OF ALL RESPONSES TO PHQ QUESTIONS 1-9: 0

## 2023-08-08 NOTE — PROGRESS NOTES
"CC: Ariana Hodge is here secondary to vaginal spotting.    HPI: The pt is a 77 year old DWF who presents with a one time of episode of spotting seen in her underwear.  This occurred in April or May.  She's had no bleeding since.  Menopause was in her late 40s, and she'd had no bleeding after that until this episode.  CT scan was done on 6/18/23 and showed \"normal pelvic organs.\"      Past Medical History:   Diagnosis Date    CHF (congestive heart failure) (H) 2/26/2020    Gastroesophageal reflux disease     Hypertension     Inverted nipple     both breasts ever since she had her breast reduction surgery    Reflux esophagitis     Skin cancer        Past Surgical History:   Procedure Laterality Date    [facility] SURGICAL PATHOLOGY  2000    ENT SURGERY      HC REDUCTION OF LARGE BREAST      Description: Breast Surgery Reduction Procedure;  Proc Date: 09/01/2006;    LUMPECTOMY BREAST Left 9/7/2021    Procedure: LEFT WIRE LOCALIZED LUMPECTOMY WITH SENTINEL LYMPH NODE BIOPSY;  Surgeon: Kinza Kent DO;  Location: [facility]    MAMMOPLASTY REDUCTION Bilateral In the 2000's       Patient's   Family History   Problem Relation Age of Onset    Hypertension Mother     Cerebrovascular Disease Mother     Cerebrovascular Disease Father     Cerebrovascular Disease Brother     Depression Brother        Patient   Social History     Socioeconomic History    Marital status:      Spouse name: None    Number of children: None    Years of education: None    Highest education level: None   Tobacco Use    Smoking status: Never     Passive exposure: Never    Smokeless tobacco: Never   Vaping Use    Vaping Use: Never used   Substance and Sexual Activity    Alcohol use: Yes     Comment: Wine daily    Drug use: Not Currently    Sexual activity: Not Currently       Outpatient Medications Prior to Visit   Medication Sig Dispense Refill    anastrozole (ARIMIDEX) 1 MG tablet Take 1 tablet (1 mg) by mouth daily 90 tablet 0    " "atorvastatin (LIPITOR) 20 MG tablet Take 1 tablet (20 mg) by mouth daily 90 tablet 0    citalopram (CELEXA) 20 MG tablet Take 1 tablet (20 mg) by mouth daily 90 tablet 3    hydrochlorothiazide (HYDRODIURIL) 25 MG tablet Take 1 tablet (25 mg) by mouth daily 90 tablet 3    hydrocortisone 2.5 % cream       ketoconazole (NIZORAL) 2 % external shampoo Apply to scalp three times a week as needed and rinse well      losartan (COZAAR) 25 MG tablet Take 1 tablet (25 mg) by mouth daily 90 tablet 3    S-Adenosylmethionine (MARY-E PO) Take 400 mg by mouth daily       Facility-Administered Medications Prior to Visit   Medication Dose Route Frequency Provider Last Rate Last Admin    lidocaine 1 % 10 mL  10 mL Intradermal Once Isabella Garsia MD        lidocaine 1 % 10 mL  10 mL Intradermal Once Isabella Garsia MD        lidocaine 1% with EPINEPHrine 1:100,000 injection 10 mL  10 mL Intradermal Once Isabella Garsia MD           Patient is allergic to cortisone and amiloride.    ROS:  12 part ROS is negative aside from those symptoms in the HPI    PE:  /64 (BP Location: Right arm, Patient Position: Sitting, Cuff Size: Adult Regular)   Pulse 80   Ht 1.499 m (4' 11\")   Wt 66.7 kg (147 lb)           Body mass index is 29.69 kg/m .    General: obese WF, NAD  Psych: normal mood  Neuro: CN I-XII grossly intact  MS: normal gait    Assessment: 77 year old DWF with a one time episode of PMB a few months ago.    Plan: Natural history of PMB discussed with the patient.  She will get an ultrasound to evaluate the endometrium.  We discussed that if it's 5 mm or more, endometrial biopsy is recommended.  Questions were answered to the best of my ability.    20 minutes spent on the date of the encounter doing chart review, review of test results, interpretation of tests, patient visit, and documentation   "

## 2023-08-15 ENCOUNTER — HOSPITAL ENCOUNTER (OUTPATIENT)
Dept: ULTRASOUND IMAGING | Facility: CLINIC | Age: 78
Discharge: HOME | End: 2023-08-15
Attending: OBSTETRICS & GYNECOLOGY | Admitting: OBSTETRICS & GYNECOLOGY
Payer: COMMERCIAL

## 2023-08-15 DIAGNOSIS — N95.0 PMB (POSTMENOPAUSAL BLEEDING): ICD-10-CM

## 2023-08-15 PROCEDURE — 76830 TRANSVAGINAL US NON-OB: CPT

## 2023-08-19 ENCOUNTER — HEALTH MAINTENANCE LETTER (OUTPATIENT)
Age: 78
End: 2023-08-19

## 2023-09-07 NOTE — PROGRESS NOTES
Hematology/Oncology Inpatient Consultation    Patient name: Jc Driscoll  : 1964  MRN: [id number]  Referring Provider:   Reason for Consultation: Metastatic lung cancer    Chief complaint: Left chest swelling around the port, left armpit redness and swelling    History of present illness:      Jc Driscoll is a 59 y.o. male who presented to [facility] on 2023 with complaints of swelling and erythema to the left axilla, left chest wall, around the left chest wall port.  Past medical history significant for metastatic lung cancer.  He initially presented to his oncologist office the same day due to the above complaints along with progressive weakness, nausea and vomiting.  He was noted to have leukocytosis with a WBC of 77,000 and significant anemia with a hemoglobin of 5.5 g/dL.  For this reason he was asked to go to the emergency room for further evaluation and treatment.    Evaluation in the ED: Sodium 127, alkaline phosphatase 270, WBC 51.20, hemoglobin 7.2 g/dL, MCV 87.9, platelets 403,000, bands 15%, absolute lymphocytes 0.0.  Normal lactic acid.  Blood cultures drawn and pending.  Patient was initiated on IV antibiotics and admitted for further evaluation and treatment.    23  Hematology/Oncology was consulted on a patient known to us and established in the office with Dr. 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  The patient initially presented in  with moderately differentiated adenocarcinoma of the right lung for which he underwent concurrent chemotherapy and radiation neoadjuvantly followed by a right lower lobectomy.  This was followed by immunotherapy with durvalumab for 24 cycles ending in 2020.  In 2022, a routine surveillance CT of the chest revealed a left upper lobe pulmonary nodule for which a subsequent PET/CT was done that showed mild uptake corresponding to the nodule.  Subsequent  "Oncology Rooming Note    October 4, 2021 11:20 AM   Ariana Hodge is a 75 year old female who presents for:    Chief Complaint   Patient presents with     Oncology Clinic Visit     new consult     Breast Cancer     Initial Vitals: /68 (BP Location: Left arm, Cuff Size: Adult Regular)   Pulse 68   Resp 16   Ht 1.499 m (4' 11\")   Wt 68.5 kg (151 lb 1.6 oz)   SpO2 96%   BMI 30.52 kg/m   Estimated body mass index is 30.52 kg/m  as calculated from the following:    Height as of this encounter: 1.499 m (4' 11\").    Weight as of this encounter: 68.5 kg (151 lb 1.6 oz). Body surface area is 1.69 meters squared.  No Pain (0) Comment: Data Unavailable   No LMP recorded. Patient is postmenopausal.  Allergies reviewed: Yes  Medications reviewed: Yes    Medications: Medication refills not needed today.  Pharmacy name entered into [facility]: [id number] [place], [place] - [address]    Clinical concerns: new consult     Iris Obrien            " CT-guided biopsy was attempted but aborted due to findings by the radiologist that the lung nodularity was actually a clump of tiny nodules of which the largest was 6 mm and therefore biopsy could not be completed.  Also the area was in the vicinity of multiple blood vessels with increased risk of bleeding.  Follow-up CT of the chest was recommended for continued surveillance.  In May 2022, the patient underwent a CT-guided biopsy of the left enlarging nodule and pathology was positive for invasive poorly differentiated adenocarcinoma most consistent with an adenocarcinoma of pulmonary origin.  PET/CT showed a 2.3 cm hypermetabolic left upper lobe nodule and no convincing metastatic disease elsewhere.  Brain MRI was negative for intracranial metastasis.  On 7/6/2022 the patient underwent a left upper lobe lobectomy which was followed by adjuvant chemotherapy.  After completion of chemotherapy the patient was initiated on atezolizumab along with XRT.  Radiation was completed on 01/11/2023.  The patient was continued on Tecentriq every 3 weeks.  Due to his increasing pain in the left axilla and concern for progressive disease a PET/CT was performed on 6/5/2023, this revealed that the chest wall mass had increased in size since the previous study.  There was osseous destruction of the second rib.  Osseous metastatic changes to the left first and second rib.  Due to progressive disease Tecentriq was discontinued.  A plan to transition to adhere to was in place but was complicated due to multiple hospitalizations for pain and weakness.  On 7/10/2023 the patient was initiated on cycle 1 of Enhertu.  Due to shortness of breath the patient had a CT of the chest on 8/9/2023 which showed a significant increase in size of the left anterior chest wall mass with an increase in destruction of the adjacent ribs particularly the left third rib.  Due to progressive disease and hereto was discontinued.  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        Patient with recurrent left lung cancer.  Progressed on multiple lines of treatment.  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  Patient has had progressive decline in clinical status.  He presented with nausea, failure to thrive decreased p.o. intake significant anxiety and constipation.  He was also found to have significant leukocytosis, erythematous left chest wall, slight swelling around the left Mediport but no redness or increase in warmth.  For this reason patient was admitted to the hospital to evaluate for implant infection, ID consultation was also requested    He/She  has a past medical history of Allergic rhinitis (07/25/2013), Anxiety and depression (06/05/2012), Chronic pansinusitis (04/08/2019), Diastolic CHF (07/09/2014), Dupuytren's contracture of both hands, Elevated cholesterol, Erosive esophagitis (07/09/2019), Gastroesophageal reflux disease (02/21/2019), Hiatal hernia (07/09/2019), History of colon polyps, Hypertension, Lung cancer, Mediastinal lymphadenopathy (03/12/2019), Normocytic anemia (08/08/2019), Prediabetes (07/09/2014), and Retention of urine (06/27/2019).    PCP: Reshma Alvarenga MD    History:  Past Medical History:   Diagnosis Date    Allergic rhinitis 07/25/2013    Overview:  4/2/2018 - still zyrtec 10 daily (if stops, gets dermatitis again).     Anxiety and depression 06/05/2012    Overview:  4/2/2018 -   C/w well 300 xr and Lito 20.  4/8/2019 -   Doing ok even with new lung cancer - lito 20 & well 300xr.     Chronic pansinusitis 04/08/2019    Overview:  4/8/2019 -   MRI showed chronic thick in frontal, maxillary, ethmoidal ---> to Dr. ZAY knowles est since abx ICC didn't help.  Does netipot bid.     Diastolic CHF 07/09/2014    Overview:  7/4/14 - impaired LV relaxation on Zhou ECHO.  EF 60%. Rest normal    Dupuytren's contracture of both hands     Elevated cholesterol     Erosive  esophagitis 2019    Overview:  EGD 2016 2018 - nexium OTC daily for few week.  Qod before that.  Now carbonation hurts, epigastric pain 2019 -   protonix 40 doing well.     Gastroesophageal reflux disease 2019    Hiatal hernia 2019    History of colon polyps     Hypertension     Lung cancer     right lung and in lymph nodes x2    Mediastinal lymphadenopathy 2019    Normocytic anemia 2019    Overview:  2019 - dropped to 9's postop 2019 - rebounded to 10's.  2019 -   Back to 9's - check ferritin, b12 and thyroid.     Prediabetes 2014    Overview:  14 - a1c 6.1 at [facility] admission    Retention of urine 2019    PSOT OP, RESOLVED   ,   Past Surgical History:   Procedure Laterality Date    BRONCHOSCOPY  2019    EBUS MONTERO NEEDLE BX    COLONOSCOPY      DUPUYTREN CONTRACTURE RELEASE Bilateral     LUNG BIOPSY  2019    CT GUIDED    LUNG REMOVAL, PARTIAL Right     right lower    PORTACATH PLACEMENT  2019    DR LONGORIA    THORACOSCOPY Right 2019    Procedure: RIGHT VATS, OPEN LOWER LOBECTOMY WITH LYMPH NODE DISECTION, WEDGE RESECTION OF RIGHT MIDDLE LOBE;  Surgeon: Terrance Longoria MD;  Location: [facility] MAIN OR;  Service: Cardiothoracic    THORACOSCOPY VIDEO ASSISTED WITH LOBECTOMY Left 2022    Procedure: THORACOSCOPY VIDEO ASSISTED WITH LOBECTOMY;  Surgeon: Miryam Reyna MD;  Location: [facility] MAIN OR;  Service: Thoracic;  Laterality: Left;   ,   Family History   Problem Relation Age of Onset    Cancer Mother         cervical cancer    Cervical cancer Mother     Cancer Father         lung cancer    Lung cancer Father     Lung cancer Sister     Cancer Sister         lung cancer    Malig Hyperthermia Neg Hx    ,   Social History     Tobacco Use    Smoking status: Former     Types: Cigarettes     Quit date: 2009     Years since quittin.0    Smokeless tobacco: Never   Vaping Use    Vaping Use: Never used   Substance Use Topics     Alcohol use: Yes     Alcohol/week: 2.0 standard drinks     Types: 2 Cans of beer per week     Comment: Occasional    Drug use: No   , (Not in a hospital admission)  , Scheduled Meds:  buPROPion XL, 300 mg, Oral, Daily  cefepime, 2,000 mg, Intravenous, Q8H  cetirizine, 10 mg, Oral, Daily  desvenlafaxine, 50 mg, Oral, Daily  folic acid, 1 mg, Oral, Daily  gabapentin, 300 mg, Oral, Nightly  levothyroxine, 125 mcg, Oral, Q AM  liothyronine, 5 mcg, Oral, Daily  megestrol acetate, 400 mg, Oral, Daily  pantoprazole, 40 mg, Oral, QAM AC  [START ON 9/8/2023] potassium chloride, 20 mEq, Oral, Daily  senna-docusate sodium, 2 tablet, Oral, BID  sodium chloride, 10 mL, Intravenous, Q12H  vancomycin, 1,000 mg, Intravenous, Q12H  vitamin B-12, 1,000 mcg, Oral, Daily  vitamin B-6, 100 mg, Oral, Q12H    , Continuous Infusions:  Pharmacy to dose vancomycin,     , PRN Meds:    acetaminophen **OR** acetaminophen **OR** acetaminophen    ALPRAZolam    aluminum-magnesium hydroxide-simethicone    senna-docusate sodium **AND** polyethylene glycol **AND** bisacodyl **AND** bisacodyl    Calcium Replacement - Follow Nurse / BPA Driven Protocol    lactulose    Magnesium Standard Dose Replacement - Follow Nurse / BPA Driven Protocol    melatonin    [DISCONTINUED] ondansetron **OR** ondansetron    ondansetron    oxyCODONE    Pharmacy to dose vancomycin    Phosphorus Replacement - Follow Nurse / BPA Driven Protocol    Potassium Replacement - Follow Nurse / BPA Driven Protocol    sodium chloride    sodium chloride    sodium chloride   Allergies:  Patient has no known allergies.    Subjective     ROS:  Review of Systems   Constitutional:  Positive for fatigue. Negative for chills and fever.   HENT:  Negative for congestion, drooling, ear discharge, rhinorrhea, sinus pressure and tinnitus.    Eyes:  Negative for photophobia, pain and discharge.   Respiratory:  Positive for shortness of breath. Negative for apnea, choking and stridor.   "  Cardiovascular:  Negative for palpitations.   Gastrointestinal:  Negative for abdominal distention, abdominal pain and anal bleeding.   Endocrine: Negative for polydipsia and polyphagia.   Genitourinary:  Negative for decreased urine volume, flank pain and genital sores.   Musculoskeletal:  Negative for gait problem, neck pain and neck stiffness.   Skin:  Negative for color change, rash and wound.   Neurological:  Positive for weakness. Negative for tremors, seizures, syncope, facial asymmetry and speech difficulty.   Hematological:  Negative for adenopathy.   Psychiatric/Behavioral:  Negative for agitation, confusion, hallucinations and self-injury. The patient is not hyperactive.       Objective   Vital Signs:   /70 (BP Location: Left arm, Patient Position: Lying)   Pulse 102   Temp 98.1 °F (36.7 °C) (Oral)   Resp 27   Ht 190.5 cm (75\")   Wt 66.7 kg (147 lb)   SpO2 99%   BMI 18.37 kg/m²     Physical Exam: (performed by MD)  Physical Exam  Vitals and nursing note reviewed.   Constitutional:       General: He is in acute distress.      Appearance: He is ill-appearing. He is not diaphoretic.   HENT:      Head: Normocephalic and atraumatic.   Eyes:      General: No scleral icterus.        Right eye: No discharge.         Left eye: No discharge.      Conjunctiva/sclera: Conjunctivae normal.   Neck:      Thyroid: No thyromegaly.   Cardiovascular:      Rate and Rhythm: Normal rate and regular rhythm.      Heart sounds: Normal heart sounds.     No friction rub. No gallop.   Pulmonary:      Effort: Pulmonary effort is normal. No respiratory distress.      Breath sounds: No stridor. No wheezing.   Abdominal:      General: Bowel sounds are normal.      Palpations: Abdomen is soft. There is no mass.      Tenderness: There is no abdominal tenderness. There is no guarding or rebound.   Musculoskeletal:         General: No tenderness. Normal range of motion.      Cervical back: Normal range of motion and neck " supple.   Lymphadenopathy:      Cervical: No cervical adenopathy.   Skin:     General: Skin is warm.      Findings: Erythema present. No rash.      Comments: Left lateral chest wall   Neurological:      Mental Status: He is alert and oriented to person, place, and time.      Motor: No abnormal muscle tone.   Psychiatric:         Behavior: Behavior normal.       Results Review:  Lab Results (last 48 hours)       Procedure Component Value Units Date/Time    Manual Differential [[id number]]  (Abnormal) Collected: 09/07/23 0737    Specimen: Blood from Hand, Left Updated: 09/07/23 0852     Neutrophil % 92.0 %      Lymphocyte % 0.0 %      Monocyte % 3.0 %      Bands %  5.0 %      Neutrophils Absolute 49.57 10*3/mm3      Lymphocytes Absolute 0.00 10*3/mm3      Monocytes Absolute 1.53 10*3/mm3      Hypochromia Slight/1+     Toxic Granulation Slight/1+     Platelet Morphology Normal    CBC & Differential [[id number]]  (Abnormal) Collected: 09/07/23 0737    Specimen: Blood from Hand, Left Updated: 09/07/23 0852    Narrative:      The following orders were created for panel order CBC & Differential.  Procedure                               Abnormality         Status                     ---------                               -----------         ------                     CBC Auto Differential[[id number]]        Abnormal            Final result               Scan Slide[[id number]]                                       Final result                 Please view results for these tests on the individual orders.    Scan Slide [[id number]] Collected: 09/07/23 0737    Specimen: Blood from Hand, Left Updated: 09/07/23 0852     Scan Slide --     Comment: See Manual Differential Results       CBC Auto Differential [[id number]]  (Abnormal) Collected: 09/07/23 0737    Specimen: Blood from Hand, Left Updated: 09/07/23 0852     WBC 51.10 10*3/mm3      RBC 2.69 10*6/mm3      Hemoglobin 7.5 g/dL      Hematocrit 23.9 %      Comment: Result checked           MCV 88.8 fL      MCH 27.9 pg      MCHC 31.5 g/dL      RDW 17.9 %      RDW-SD 56.0 fl      MPV 6.8 fL      Platelets 358 10*3/mm3     Narrative:      The previously reported component NRBC is no longer being reported. Previous result was 0.0 /100 WBC (Reference Range: 0.0-0.2 /100 WBC) on 9/7/2023 at 0801 EDT.    Potassium [[id number]]  (Abnormal) Collected: 09/07/23 0737    Specimen: Blood from Hand, Left Updated: 09/07/23 0753     Potassium 3.4 mmol/L     Manual Differential [[id number]]  (Abnormal) Collected: 09/07/23 0041    Specimen: Blood Updated: 09/07/23 0221     Neutrophil % 90.0 %      Lymphocyte % 2.0 %      Monocyte % 2.0 %      Eosinophil % 2.0 %      Basophil % 1.0 %      Bands %  3.0 %      Neutrophils Absolute 41.66 10*3/mm3      Lymphocytes Absolute 0.90 10*3/mm3      Monocytes Absolute 0.90 10*3/mm3      Eosinophils Absolute 0.90 10*3/mm3      Basophils Absolute 0.45 10*3/mm3      Anisocytosis Slight/1+     Toxic Granulation Slight/1+     Platelet Morphology Normal    Scan Slide [[id number]] Collected: 09/07/23 0041    Specimen: Blood Updated: 09/07/23 0220     Scan Slide --     Comment: See Manual Differential Results       CBC Auto Differential [[id number]]  (Abnormal) Collected: 09/07/23 0041    Specimen: Blood Updated: 09/07/23 0220     WBC 44.80 10*3/mm3      RBC 2.23 10*6/mm3      Hemoglobin 6.1 g/dL      Hematocrit 19.6 %      MCV 87.9 fL      MCH 27.2 pg      MCHC 30.9 g/dL      RDW 18.6 %      RDW-SD 57.8 fl      MPV 7.0 fL      Platelets 335 10*3/mm3     Narrative:      The previously reported component NRBC is no longer being reported. Previous result was 0.0 /100 WBC (Reference Range: 0.0-0.2 /100 WBC) on 9/7/2023 at 0058 EDT.    Basic Metabolic Panel [[id number]]  (Abnormal) Collected: 09/07/23 0041    Specimen: Blood Updated: 09/07/23 0139     Glucose 88 mg/dL      BUN 9 mg/dL      Creatinine 0.71 mg/dL      Sodium 128 mmol/L      Potassium 3.5 mmol/L      Chloride 94 mmol/L      CO2  20.0 mmol/L      Calcium 8.1 mg/dL      BUN/Creatinine Ratio 12.7     Anion Gap 14.0 mmol/L      eGFR 105.7 mL/min/1.73     Narrative:      GFR Normal >60  Chronic Kidney Disease <60  Kidney Failure <15      Lactic Acid, Plasma [[id number]]  (Normal) Collected: 09/07/23 0041    Specimen: Blood Updated: 09/07/23 0136     Lactate 0.9 mmol/L     MRSA Screen, PCR (Inpatient) - Swab, Nares [[id number]]  (Normal) Collected: 09/06/23 2325    Specimen: Swab from Nares Updated: 09/07/23 0121     MRSA PCR No MRSA Detected    Narrative:      The negative predictive value of this diagnostic test is high and should only be used to consider de-escalating anti-MRSA therapy. A positive result may indicate colonization with MRSA and must be correlated clinically.    Sodium, Urine, Random - Urine, Clean Catch [[id number]] Collected: 09/06/23 2320    Specimen: Urine, Clean Catch Updated: 09/06/23 2342     Sodium, Urine 20 mmol/L     Narrative:      Reference intervals for random urine have not been established.  Clinical usage is dependent upon physician's interpretation in combination with other laboratory tests.       Osmolality, Urine - Urine, Clean Catch [[id number]]  (Abnormal) Collected: 09/06/23 2320    Specimen: Urine, Clean Catch Updated: 09/06/23 2339     Osmolality, Urine 297 mOsm/kg     Urinalysis With Culture If Indicated - Urine, Clean Catch [[id number]]  (Abnormal) Collected: 09/06/23 2320    Specimen: Urine, Clean Catch Updated: 09/06/23 2334     Color, UA Yellow     Appearance, UA Clear     pH, UA 6.5     Specific Gravity, UA 1.032     Glucose, UA Negative     Ketones, UA Trace     Bilirubin, UA Negative     Blood, UA Negative     Protein, UA Trace     Leuk Esterase, UA Negative     Nitrite, UA Negative     Urobilinogen, UA 1.0 E.U./dL    Narrative:      In absence of clinical symptoms, the presence of pyuria, bacteria, and/or nitrites on the urinalysis result does not correlate with infection.  Urine microscopic not  "indicated.    Procalcitonin [[id number]]  (Abnormal) Collected: 09/06/23 1729    Specimen: Blood Updated: 09/06/23 2322     Procalcitonin 0.60 ng/mL     Narrative:      As a Marker for Sepsis (Non-Neonates):    1. <0.5 ng/mL represents a low risk of severe sepsis and/or septic shock.  2. >2 ng/mL represents a high risk of severe sepsis and/or septic shock.    As a Marker for Lower Respiratory Tract Infections that require antibiotic therapy:    PCT on Admission    Antibiotic Therapy       6-12 Hrs later    >0.5                Strongly Recommended  >0.25 - <0.5        Recommended   0.1 - 0.25          Discouraged              Remeasure/reassess PCT  <0.1                Strongly Discouraged     Remeasure/reassess PCT    As 28 day mortality risk marker: \"Change in Procalcitonin Result\" (>80% or <=80%) if Day 0 (or Day 1) and Day 4 values are available. Refer to http://www.[email or web address]-pct-calculator.com    Change in PCT <=80%  A decrease of PCT levels below or equal to 80% defines a positive change in PCT test result representing a higher risk for 28-day all-cause mortality of patients diagnosed with severe sepsis for septic shock.    Change in PCT >80%  A decrease of PCT levels of more than 80% defines a negative change in PCT result representing a lower risk for 28-day all-cause mortality of patients diagnosed with severe sepsis or septic shock.       Osmolality, Serum [[id number]]  (Abnormal) Collected: 09/06/23 1729    Specimen: Blood Updated: 09/06/23 2314     Osmolality 262 mOsm/kg     [place] Draw [[id number]] Collected: 09/06/23 1729    Specimen: Blood Updated: 09/06/23 1832    Narrative:      The following orders were created for panel order [place] Draw.  Procedure                               Abnormality         Status                     ---------                               -----------         ------                     Green Top (Gel)[[id number]]                                  Final result             "   Lavender Top[[id number]]                                     Final result               Gold Top - SST[[id number]]                                   Final result               Light Blue Top[[id number]]                                   Final result                 Please view results for these tests on the individual orders.    Gold Top - SST [[id number]] Collected: 09/06/23 1729    Specimen: Blood Updated: 09/06/23 1832     Extra Tube Hold for add-ons.     Comment: Auto resulted.       Green Top (Gel) [[id number]] Collected: 09/06/23 1729    Specimen: Blood Updated: 09/06/23 1832     Extra Tube Hold for add-ons.     Comment: Auto resulted.       Light Blue Top [[id number]] Collected: 09/06/23 1729    Specimen: Blood Updated: 09/06/23 1832     Extra Tube Hold for add-ons.     Comment: Auto resulted       Manual Differential [[id number]]  (Abnormal) Collected: 09/06/23 1729    Specimen: Blood Updated: 09/06/23 1825     Neutrophil % 81.0 %      Lymphocyte % 0.0 %      Monocyte % 3.0 %      Bands %  15.0 %      Myelocyte % 1.0 %      Neutrophils Absolute 49.15 10*3/mm3      Lymphocytes Absolute 0.00 10*3/mm3      Monocytes Absolute 1.54 10*3/mm3      Anisocytosis Slight/1+     Toxic Granulation Mod/2+     Vacuolated Neutrophils Slight/1+     Large Platelets Slight/1+    CBC & Differential [[id number]]  (Abnormal) Collected: 09/06/23 1729    Specimen: Blood Updated: 09/06/23 1825    Narrative:      The following orders were created for panel order CBC & Differential.  Procedure                               Abnormality         Status                     ---------                               -----------         ------                     CBC Auto Differential[[id number]]        Abnormal            Final result               Scan Slide[[id number]]                                       Final result                 Please view results for these tests on the individual orders.    CBC Auto Differential [[id number]]  (Abnormal)  Collected: 09/06/23 1729    Specimen: Blood Updated: 09/06/23 1825     WBC 51.20 10*3/mm3      RBC 2.64 10*6/mm3      Hemoglobin 7.2 g/dL      Hematocrit 23.2 %      MCV 87.9 fL      MCH 27.5 pg      MCHC 31.3 g/dL      RDW 18.5 %      RDW-SD 56.9 fl      MPV 6.7 fL      Platelets 403 10*3/mm3     Scan Slide [[id number]] Collected: 09/06/23 1729    Specimen: Blood Updated: 09/06/23 1825     Scan Slide --     Comment: See Manual Differential Results       Blood Culture - Blood, Arm, Left [[id number]] Collected: 09/06/23 1756    Specimen: Blood from Arm, Left Updated: 09/06/23 1806    Blood Culture - Blood, Arm, Right [[id number]] Collected: 09/06/23 1756    Specimen: Blood from Arm, Right Updated: 09/06/23 1806    Lavender Top [[id number]] Collected: 09/06/23 1729    Specimen: Blood Updated: 09/06/23 1757     Extra Tube done    Comprehensive Metabolic Panel [[id number]]  (Abnormal) Collected: 09/06/23 1729    Specimen: Blood Updated: 09/06/23 1756     Glucose 87 mg/dL      BUN 10 mg/dL      Creatinine 0.76 mg/dL      Sodium 127 mmol/L      Potassium 3.4 mmol/L      Chloride 89 mmol/L      CO2 22.0 mmol/L      Calcium 8.6 mg/dL      Total Protein 5.9 g/dL      Albumin 2.6 g/dL      ALT (SGPT) 17 U/L      AST (SGOT) 24 U/L      Alkaline Phosphatase 270 U/L      Total Bilirubin 0.6 mg/dL      Globulin 3.3 gm/dL      A/G Ratio 0.8 g/dL      BUN/Creatinine Ratio 13.2     Anion Gap 16.0 mmol/L      eGFR 103.5 mL/min/1.73     Narrative:      GFR Normal >60  Chronic Kidney Disease <60  Kidney Failure <15      POC Lactate [[id number]]  (Normal) Collected: 09/06/23 1738    Specimen: Blood Updated: 09/06/23 1740     Lactate 1.4 mmol/L      Comment: Serial Number: [id number]:  [id number]                Pending Results:     Imaging Reviewed:   CT Chest With Contrast Diagnostic    Result Date: 9/6/2023  Since 8/9/2023 interval increase in size of the patient's known left anterior chest wall mass now measuring approximately 8.3 x  6.2 cm. There are stable destructive changes of the anterior left second third and fourth ribs. Consolidative changes in the left lung apex similar to that seen on prior examination which could represent partial collapse versus a underlying infiltrate and correlation for pneumonia is recommended. Small left pleural effusion. Coronary artery calcifications. Dilatation of the main pulmonary artery which can be seen with pulmonary hypertension. 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  Upon recurrence patient underwent radiation therapy and was on Tecentriq.  Recently progressed through [facility].  Plan for initiating tepotinib due to MET amplification once clinically improved.  Leukocytosis with bandemia: Infectious disease following.  Believed to be reactive leukocytosis secondary to malignancy and left-sided chest erythema to be from tumor infiltration into the soft tissues of the skin.  Blood cultures are pending.  Anemia: Hemoglobin 6.1 g/dL for which the patient received packed red blood cell transfusion.  Hyponatremia: Stable    PLAN  Plan to start tepotinib once clinically stable  Monitor CBC and transfuse for hemoglobin less than 7.0 g/dL or less than 8.0 g/dL if symptomatic    Electronically signed by MULUGETA Garcia, 09/07/23, 11:35 AM EDT.    Thank you for this consult. We will be happy to follow along with you.     Patient with recurrent left lung cancer.  Progressed on multiple lines of treatment.  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  Patient has had progressive decline in clinical status.  He presented with nausea, failure to thrive decreased p.o. intake significant anxiety and constipation.   He was also found to have significant leukocytosis, erythematous left chest wall, slight swelling around the left Mediport but no redness or increase in warmth.  For this reason patient was admitted to the hospital to evaluate for implant infection, ID consultation was also requested    On exam he has a left lateral chest wall erythema left Mediport is swollen but there is no redness or increase in warmth  Patient appears very anxious.    I reviewed his labs, imaging studies progress notes  ID has been consulted  He has been started on empiric IV antibiotics  Cultures have also been drawn  We will hold his chemotherapy for now until ruled out infection.  He is actively receiving treatment for his anxiety  For his constipation patient has had a bowel movement  Discussed with patient    We will continue to follow    Electronically signed by Azucena Gaspar MD, 09/08/23, 8:48 AM EDT.

## 2023-10-05 DIAGNOSIS — E78.5 DYSLIPIDEMIA: ICD-10-CM

## 2023-10-05 RX ORDER — ATORVASTATIN CALCIUM 20 MG/1
20 TABLET, FILM COATED ORAL DAILY
Qty: 90 TABLET | Refills: 0 | Status: SHIPPED | OUTPATIENT
Start: 2023-10-05 | End: 2023-12-22

## 2023-11-23 DIAGNOSIS — C50.912 INVASIVE DUCTAL CARCINOMA OF BREAST, STAGE 1, LEFT (H): ICD-10-CM

## 2023-11-24 RX ORDER — ANASTROZOLE 1 MG/1
1 TABLET ORAL DAILY
Qty: 90 TABLET | Refills: 0 | Status: SHIPPED | OUTPATIENT
Start: 2023-11-24 | End: 2024-05-28

## 2023-11-24 NOTE — TELEPHONE ENCOUNTER
Anastrozole 1 mg tablet   Directions: Take 1 tablet by mouth daily  sent from E-prescribing interface    Last Written Prescription Date:  3/3/23, filled by Tammi Butterfield PA-C  Last Fill Quantity: 90,  # refills: 0   Last office visit provider:  4/3/23 with Dr. Caputo  Next visit scheduled on 12/4/23        Coni Linder, RN 11/24/23 9:19 AM

## 2023-12-01 ENCOUNTER — TELEPHONE (OUTPATIENT)
Dept: ONCOLOGY | Facility: CLINIC | Age: 78
End: 2023-12-01
Payer: COMMERCIAL

## 2023-12-04 ENCOUNTER — ONCOLOGY VISIT (OUTPATIENT)
Dept: ONCOLOGY | Facility: CLINIC | Age: 78
End: 2023-12-04
Attending: INTERNAL MEDICINE
Payer: COMMERCIAL

## 2023-12-04 VITALS
BODY MASS INDEX: 29.84 KG/M2 | HEART RATE: 64 BPM | SYSTOLIC BLOOD PRESSURE: 124 MMHG | WEIGHT: 148 LBS | DIASTOLIC BLOOD PRESSURE: 72 MMHG | RESPIRATION RATE: 18 BRPM | OXYGEN SATURATION: 97 % | HEIGHT: 59 IN

## 2023-12-04 DIAGNOSIS — C50.912 INVASIVE DUCTAL CARCINOMA OF BREAST, LEFT (H): Primary | ICD-10-CM

## 2023-12-04 PROCEDURE — 99214 OFFICE O/P EST MOD 30 MIN: CPT | Performed by: INTERNAL MEDICINE

## 2023-12-04 PROCEDURE — G0463 HOSPITAL OUTPT CLINIC VISIT: HCPCS | Performed by: INTERNAL MEDICINE

## 2023-12-04 RX ORDER — DEXTROMETHORPHAN POLISTIREX 30 MG/5ML
60 SUSPENSION ORAL 2 TIMES DAILY
COMMUNITY

## 2023-12-04 RX ORDER — GUAIFENESIN 200 MG/1
200 TABLET ORAL EVERY 4 HOURS PRN
COMMUNITY
End: 2024-08-26

## 2023-12-04 ASSESSMENT — ENCOUNTER SYMPTOMS
ENDOCRINE COMMENTS: OSTEOPOROSIS
CARDIOVASCULAR NEGATIVE: 1
CONSTITUTIONAL NEGATIVE: 1
ENDOCRINE NEGATIVE: 1
EYES NEGATIVE: 1
PSYCHIATRIC NEGATIVE: 1
GASTROINTESTINAL NEGATIVE: 1
BACK PAIN: 1
COUGH: 1
NEUROLOGICAL NEGATIVE: 1
HEMATOLOGIC/LYMPHATIC NEGATIVE: 1

## 2023-12-04 ASSESSMENT — PAIN SCALES - GENERAL: PAINLEVEL: NO PAIN (0)

## 2023-12-04 NOTE — LETTER
"    12/4/2023         RE: Ariana Hodge  [address] [place] [address]        Dear Colleague,    Thank you for referring your patient, Ariana Hodge, to the [facility]. Please see a copy of my visit note below.    Oncology Rooming Note    December 4, 2023 1:01 PM   Ariana Hodge is a 78 year old female who presents for:    Chief Complaint   Patient presents with     Oncology Clinic Visit       Invasive ductal carcinoma of breast, left        Initial Vitals: /72   Pulse 64   Resp 18   Ht 1.499 m (4' 11\")   Wt 67.1 kg (148 lb)   SpO2 97%   BMI 29.89 kg/m   Estimated body mass index is 29.89 kg/m  as calculated from the following:    Height as of this encounter: 1.499 m (4' 11\").    Weight as of this encounter: 67.1 kg (148 lb). Body surface area is 1.67 meters squared.  No Pain (0) Comment: Data Unavailable   No LMP recorded. Patient is postmenopausal.  Allergies reviewed: Yes  Medications reviewed: Yes    Medications: Medication refills not needed today.  Pharmacy name entered into [employer]: [id number] - [place], [place] - [address]    Clinical concerns:  6 month follow up      Iris Obrien              Assessment & Plan  Left breast ductal carcinoma 2021  Adjuvant anastrozole    1 year follow up  Continue Anastrozole    Interval History  This is a routine follow up visit for this fit breast cancer survivor on adjuvant aromatase inhibitor.  She has noticed an annoying cough this week but otherwise is well.    ECOG = 0    Oncologic History  Diagnosis:     1. Invasive ductal carcinoma of the left breast: Diagnosed August 2021. Upper inner quadrant. Grade 2, ER/NC positive, HER-2/josh negative. Tumor size was 15 x 13 x 8 mm. 2 sentinel lymph nodes were negative.  Oncotype recurrence score is 27 (16%).     Treatment:     Breast conservation surgery performed on September 7, 2021.  Arimidex started mid October 2021.    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     Facility-Administered Medications Ordered in Other Visits   Medication     lidocaine 1 % 10 mL     lidocaine 1 % 10 mL     lidocaine 1% with EPINEPHrine 1:100,000 injection 10 mL     Past Medical History:   Diagnosis Date     CHF (congestive heart failure) (H) 2/26/2020     Gastroesophageal reflux disease      Hypertension      Inverted nipple     both breasts ever since she had her breast reduction surgery     Reflux esophagitis      Skin cancer      Past Surgical History:   Procedure Laterality Date     [facility] SURGICAL PATHOLOGY  2000     ENT SURGERY       HC REDUCTION OF LARGE BREAST      Description: Breast Surgery Reduction Procedure;  Proc Date: 09/01/2006;     LUMPECTOMY BREAST Left 9/7/2021    Procedure: LEFT WIRE LOCALIZED LUMPECTOMY WITH SENTINEL LYMPH NODE BIOPSY;  Surgeon: Kinza Kent DO;  Location: [facility]     MAMMOPLASTY REDUCTION Bilateral In the 2000's     Socioeconomic History     Marital status:      Has a cat    Review of Systems   Constitutional: Negative.    HENT:  Negative.     Eyes: Negative.    Respiratory:  Positive for cough.    Cardiovascular: Negative.    Gastrointestinal: Negative.    Endocrine: Negative.         Osteoporosis   Genitourinary: Negative.          Had an incidental cyst identified on  "kidney imaging this past summer   Musculoskeletal:  Positive for back pain.   Neurological: Negative.    Hematological: Negative.    Psychiatric/Behavioral: Negative.     All other systems reviewed and are negative.      /72   Pulse 64   Resp 18   Ht 1.499 m (4' 11\")   Wt 67.1 kg (148 lb)   SpO2 97%   BMI 29.89 kg/m      Physical Exam  Vitals and nursing note reviewed.   Constitutional:       Appearance: Normal appearance. She is well-developed and well-groomed.      Comments: Relaxed, friendly   HENT:      Head: Normocephalic and atraumatic.      Mouth/Throat:      Mouth: Mucous membranes are moist.      Dentition: Normal dentition. No dental caries.   Eyes:      Extraocular Movements: Extraocular movements intact.      Conjunctiva/sclera: Conjunctivae normal.      Pupils: Pupils are equal, round, and reactive to light.   Cardiovascular:      Rate and Rhythm: Normal rate and regular rhythm.      Heart sounds: Normal heart sounds.   Pulmonary:      Effort: Pulmonary effort is normal.      Breath sounds: Normal breath sounds.   Abdominal:      General: There is no distension.      Palpations: Abdomen is soft. There is no mass.      Tenderness: There is no abdominal tenderness. There is no guarding.   Musculoskeletal:         General: No deformity.      Cervical back: Normal range of motion and neck supple.      Right lower leg: No edema.      Left lower leg: No edema.   Lymphadenopathy:      Cervical: No cervical adenopathy.      Upper Body:      Right upper body: No axillary or epitrochlear adenopathy.      Left upper body: No axillary or epitrochlear adenopathy.   Skin:     General: Skin is warm and dry.   Neurological:      General: No focal deficit present.      Mental Status: She is alert and oriented to person, place, and time. Mental status is at baseline.      Cranial Nerves: No cranial nerve deficit.      Motor: No weakness.      Gait: Gait normal.      Deep Tendon Reflexes: Reflexes normal. "   Psychiatric:         Mood and Affect: Mood normal.         Behavior: Behavior normal. Behavior is cooperative.         Thought Content: Thought content normal.         Judgment: Judgment normal.         No results found for this or any previous visit (from the past 720 hour(s)).  No results found for this or any previous visit (from the past 744 hour(s)).      Again, thank you for allowing me to participate in the care of your patient.        Sincerely,        Nova Cantu MD

## 2023-12-04 NOTE — PROGRESS NOTES
"Oncology Rooming Note    December 4, 2023 1:01 PM   Ariana Hodge is a 78 year old female who presents for:    Chief Complaint   Patient presents with    Oncology Clinic Visit       Invasive ductal carcinoma of breast, left        Initial Vitals: /72   Pulse 64   Resp 18   Ht 1.499 m (4' 11\")   Wt 67.1 kg (148 lb)   SpO2 97%   BMI 29.89 kg/m   Estimated body mass index is 29.89 kg/m  as calculated from the following:    Height as of this encounter: 1.499 m (4' 11\").    Weight as of this encounter: 67.1 kg (148 lb). Body surface area is 1.67 meters squared.  No Pain (0) Comment: Data Unavailable   No LMP recorded. Patient is postmenopausal.  Allergies reviewed: Yes  Medications reviewed: Yes    Medications: Medication refills not needed today.  Pharmacy name entered into [facility]: [id number] [place], [place] - [address]    Clinical concerns:  6 month follow up      Iris Obrien            "

## 2023-12-04 NOTE — PROGRESS NOTES
Assessment & Plan   Left breast ductal carcinoma 2021  Adjuvant anastrozole    1 year follow up  Continue Anastrozole    Interval History  This is a routine follow up visit for this fit breast cancer survivor on adjuvant aromatase inhibitor.  She has noticed an annoying cough this week but otherwise is well.    ECOG = 0    Oncologic History  Diagnosis:     1. Invasive ductal carcinoma of the left breast: Diagnosed August 2021. Upper inner quadrant. Grade 2, ER/IN positive, HER-2/josh negative. Tumor size was 15 x 13 x 8 mm. 2 sentinel lymph nodes were negative.  Oncotype recurrence score is 27 (16%).     Treatment:     Breast conservation surgery performed on September 7, 2021.  Arimidex started mid October 2021.    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     Facility-Administered Medications Ordered in Other Visits   Medication    lidocaine 1 % 10 mL    lidocaine 1 % 10 mL    lidocaine 1% with EPINEPHrine 1:100,000 injection 10 mL     Past Medical History:   Diagnosis Date    CHF (congestive heart failure) (H) 2/26/2020    Gastroesophageal reflux disease     Hypertension     Inverted nipple     both breasts ever since she had her breast reduction surgery    Reflux esophagitis     Skin cancer      Past Surgical History:   Procedure Laterality Date    [facility] SURGICAL PATHOLOGY   "2000    ENT SURGERY      HC REDUCTION OF LARGE BREAST      Description: Breast Surgery Reduction Procedure;  Proc Date: 09/01/2006;    LUMPECTOMY BREAST Left 9/7/2021    Procedure: LEFT WIRE LOCALIZED LUMPECTOMY WITH SENTINEL LYMPH NODE BIOPSY;  Surgeon: Kinza Kent DO;  Location: [facility]    MAMMOPLASTY REDUCTION Bilateral In the 2000's     Socioeconomic History    Marital status:      Has a cat    Review of Systems   Constitutional: Negative.    HENT:  Negative.     Eyes: Negative.    Respiratory:  Positive for cough.    Cardiovascular: Negative.    Gastrointestinal: Negative.    Endocrine: Negative.         Osteoporosis   Genitourinary: Negative.          Had an incidental cyst identified on kidney imaging this past summer   Musculoskeletal:  Positive for back pain.   Neurological: Negative.    Hematological: Negative.    Psychiatric/Behavioral: Negative.     All other systems reviewed and are negative.      /72   Pulse 64   Resp 18   Ht 1.499 m (4' 11\")   Wt 67.1 kg (148 lb)   SpO2 97%   BMI 29.89 kg/m      Physical Exam  Vitals and nursing note reviewed.   Constitutional:       Appearance: Normal appearance. She is well-developed and well-groomed.      Comments: Relaxed, friendly   HENT:      Head: Normocephalic and atraumatic.      Mouth/Throat:      Mouth: Mucous membranes are moist.      Dentition: Normal dentition. No dental caries.   Eyes:      Extraocular Movements: Extraocular movements intact.      Conjunctiva/sclera: Conjunctivae normal.      Pupils: Pupils are equal, round, and reactive to light.   Cardiovascular:      Rate and Rhythm: Normal rate and regular rhythm.      Heart sounds: Normal heart sounds.   Pulmonary:      Effort: Pulmonary effort is normal.      Breath sounds: Normal breath sounds.   Abdominal:      General: There is no distension.      Palpations: Abdomen is soft. There is no mass.      Tenderness: There is no abdominal tenderness. There is no " guarding.   Musculoskeletal:         General: No deformity.      Cervical back: Normal range of motion and neck supple.      Right lower leg: No edema.      Left lower leg: No edema.   Lymphadenopathy:      Cervical: No cervical adenopathy.      Upper Body:      Right upper body: No axillary or epitrochlear adenopathy.      Left upper body: No axillary or epitrochlear adenopathy.   Skin:     General: Skin is warm and dry.   Neurological:      General: No focal deficit present.      Mental Status: She is alert and oriented to person, place, and time. Mental status is at baseline.      Cranial Nerves: No cranial nerve deficit.      Motor: No weakness.      Gait: Gait normal.      Deep Tendon Reflexes: Reflexes normal.   Psychiatric:         Mood and Affect: Mood normal.         Behavior: Behavior normal. Behavior is cooperative.         Thought Content: Thought content normal.         Judgment: Judgment normal.         No results found for this or any previous visit (from the past 720 hour(s)).  No results found for this or any previous visit (from the past 744 hour(s)).

## 2023-12-22 ENCOUNTER — OFFICE VISIT (OUTPATIENT)
Dept: FAMILY MEDICINE | Facility: CLINIC | Age: 78
End: 2023-12-22
Payer: COMMERCIAL

## 2023-12-22 VITALS
OXYGEN SATURATION: 94 % | TEMPERATURE: 98.6 F | HEIGHT: 59 IN | WEIGHT: 145 LBS | RESPIRATION RATE: 12 BRPM | BODY MASS INDEX: 29.23 KG/M2 | HEART RATE: 76 BPM | DIASTOLIC BLOOD PRESSURE: 86 MMHG | SYSTOLIC BLOOD PRESSURE: 152 MMHG

## 2023-12-22 DIAGNOSIS — F32.5 MAJOR DEPRESSION IN REMISSION (H): ICD-10-CM

## 2023-12-22 DIAGNOSIS — G89.29 CHRONIC LEFT-SIDED LOW BACK PAIN WITH LEFT-SIDED SCIATICA: ICD-10-CM

## 2023-12-22 DIAGNOSIS — Z00.00 MEDICARE ANNUAL WELLNESS VISIT, SUBSEQUENT: Primary | ICD-10-CM

## 2023-12-22 DIAGNOSIS — I10 ESSENTIAL HYPERTENSION: ICD-10-CM

## 2023-12-22 DIAGNOSIS — H92.02 LEFT EAR PAIN: ICD-10-CM

## 2023-12-22 DIAGNOSIS — M54.42 CHRONIC LEFT-SIDED LOW BACK PAIN WITH LEFT-SIDED SCIATICA: ICD-10-CM

## 2023-12-22 DIAGNOSIS — R26.81 UNSTEADY GAIT WHEN WALKING: ICD-10-CM

## 2023-12-22 DIAGNOSIS — E78.5 DYSLIPIDEMIA: ICD-10-CM

## 2023-12-22 PROCEDURE — 99214 OFFICE O/P EST MOD 30 MIN: CPT | Mod: 25 | Performed by: NURSE PRACTITIONER

## 2023-12-22 PROCEDURE — G0439 PPPS, SUBSEQ VISIT: HCPCS | Performed by: NURSE PRACTITIONER

## 2023-12-22 RX ORDER — LIDOCAINE 50 MG/G
1 PATCH TOPICAL EVERY 24 HOURS
Qty: 5 PATCH | Refills: 3 | Status: SHIPPED | OUTPATIENT
Start: 2023-12-22

## 2023-12-22 RX ORDER — ATORVASTATIN CALCIUM 20 MG/1
20 TABLET, FILM COATED ORAL DAILY
Qty: 90 TABLET | Refills: 4 | Status: SHIPPED | OUTPATIENT
Start: 2023-12-22 | End: 2024-08-27

## 2023-12-22 ASSESSMENT — ENCOUNTER SYMPTOMS: COUGH: 1

## 2023-12-22 NOTE — PROGRESS NOTES
"  Assessment & Plan     Unsteady gait when walking  *- at risk for falls. Will see Physical therapy and get a cane. I discussed fall prevention today.   - Physical Therapy Referral    Essential hypertension  *- Bp elevated today, but has been stable in past. Will visit with staff to recheck it in 1-2 weeks. Can increase Losartan since BMP has been stable     Chronic left-sided low back pain with left-sided sciatica  \- can see physical therapy. She is requesting 5 % patches and I discussed these patches can be hard to be covered since 4 % is available over the counter. She feels they are pretty expensive and will like to see if her insurance will cover them. PA discussed. Can use 4 % if not covered.   - lidocaine (LIDODERM) 5 % patch  Dispense: 5 patch; Refill: 3    Left ear pain  *- appears chronic, can see ENT  - Adult ENT  Referral    Dyslipidemia  Will monitor  - atorvastatin (LIPITOR) 20 MG tablet  Dispense: 90 tablet; Refill: 4    Major depression in remission (H24)  *- stable, I reviewed BH forms Medicare annual wellness visit, subsequent  *spent over 30 minutes in combination with patient and reviewing chart, imaging, and notes.           BMI:   Estimated body mass index is 29.29 kg/m  as calculated from the following:    Height as of this encounter: 1.499 m (4' 11\").    Weight as of this encounter: 65.8 kg (145 lb).   Weight management plan: Discussed healthy diet and exercise guidelines    TUSHAR Clark [facility]    Erin Lane is a 78 year old, presenting for the following health issues:  Physical (Medicare wellness), Ear Problem (Pain in the left ear), Recheck Medication (Would Idocrase Patch prescription ), Musculoskeletal Problem (Numbness in the left arm in the morning when patient wakes up), Abdominal Pain (On the left side intense pain), and Cough        5/12/2023     2:53 PM   Additional Questions   Roomed by Area F     -Can have " left ear pain.  The intensity is intermittent, but the dull achy sensation is constant.  She had surgery on this ear, and is chronically deaf.  She saw audiology and they thought they noticed some redness. HO vertigo and BPPV.    -She stated that she thinks she had RSV last week.  She is still coughing up mucus.  Cough medicine such as Delsym has been effective. Denied sob or fevers. No wheezing.     -She has a history of intermittent sciatica pain, more on the left side of her buttock.  She has noticed that her gait can be unsteady, especially when she walks out to the parking lot and carrying items.  She has been questing a handicap sticker.  Her friends are concerned about her and falling.  She denied any recent falls.  She has seen physical therapy in the past for unsteady gait.  She has a history of vertigo.   -She stated that sometimes her left hand and now can go numb, she will notice this in the morning when waking up.  During the day she has no numbness or tingling.  She denied any pain.   -She will still sometimes get right and left lower quadrant pain.  The pain can be intense.  Activity helps alleviate the pain.  She denied any bowel changes.     History of Present Illness       Reason for visit:  Abdominal pain  back pain  handicap sticker  Symptom onset:  More than a month    She eats 2-3 servings of fruits and vegetables daily.She consumes 0 sweetened beverage(s) daily.She exercises with enough effort to increase her heart rate 9 or less minutes per day.  She exercises with enough effort to increase her heart rate 3 or less days per week. She is missing 7 dose(s) of medications per week.         Annual Wellness Visit    Patient has been advised of split billing requirements and indicates understanding: Yes     Are you in the first 12 months of your Medicare Part B coverage?  No    Physical Health:  In general, how would you rate your overall physical health? good  Outside of work, how many days during  "the week do you exercise?none  Outside of work, approximately how many minutes a day do you exercise?not applicable  If you drink alcohol do you typically have >3 drinks per day or >7 drinks per week? Yes - AUDIT SCORE:          No data to display              Do you usually eat at least 4 servings of fruit and vegetables a day, include whole grains & fiber and avoid regularly eating high fat or \"junk\" foods? No  Do you have any problems taking medications regularly? No  Do you have any side effects from medications? none  Needs assistance for the following daily activities: no assistance needed  Which of the following safety concerns are present in your home?  none identified     In the past 6 months, have you been bothered by leaking of urine? no    Mental Health:  In general, how would you rate your overall mental or emotional health? excellent      Today's PHQ-9 Score:       2023     4:12 PM   PHQ-9 SCORE   PHQ-9 Total Score 0         2021    12:44 PM   MALCOM-7 SCORE   Total Score 0       Do you feel safe in your environment? Yes    Have you ever done Advance Care Planning? (For example, a Health Directive, POLST, or a discussion with a medical provider or your loved ones about your wishes)? No, advance care planning information given to patient to review.  Patient declined advance care planning discussion at this time.    Fall risk: No falls this year.      c  Cognitive Screenin) Repeat 3 items (Leader, Season, Table)    2) Clock draw: NORMAL  3) 3 item recall: Recalls 2 objects   Results: NORMAL clock, 1-2 items recalled: COGNITIVE IMPAIRMENT LESS LIKELY    Mini-CogTM Copyright LILIAN Noyola. Licensed by the author for use in [facility]; reprinted with permission (tonio@.[facility]). All rights reserved.      Do you have sleep apnea, excessive snoring or daytime drowsiness? : no    Social History     Tobacco Use    Smoking status: Never     Passive exposure: Never    Smokeless tobacco: Never "   Substance Use Topics    Alcohol use: Yes     Comment: Wine daily            No data to display                   No data to display              Do you have a current opioid prescription? No  Do you use any other controlled substances or medications that are not prescribed by a provider? None  }  Current providers sharing in care for this patient include:Patient Care Team:  Rach Hernandez APRN CNP as PCP - General (Family Medicine)  Ashvin Alejandre PA-C as Physician Assistant (Physician Assistant)  Kinza Kent DO as Assigned Surgical Provider  Tommy Caputo MD as Assigned Cancer Care Provider  Tommy Caputo MD (Internal Medicine)  Rach Hernandez APRN CNP as Assigned PCP  Judith Jon MD as Assigned OBGYN Provider    The following health maintenance items are reviewed in Epic and correct as of today:  Health Maintenance   Topic Date Due    DEPRESSION ACTION PLAN  Never done    HEPATITIS C SCREENING  Never done    RSV VACCINE (Pregnancy & 60+) (1 - 1-dose 60+ series) Never done    IPV IMMUNIZATION (2 of 3 - Adult catch-up series) 02/13/2008    MEDICARE ANNUAL WELLNESS VISIT  05/12/2022    COVID-19 Vaccine (5 - 2023-24 season) 09/01/2023    PHQ-9  02/08/2024    ANNUAL REVIEW OF HM ORDERS  03/03/2024    FALL RISK ASSESSMENT  03/03/2024    LIPID  05/17/2026    ADVANCE CARE PLANNING  12/22/2028    DTAP/TDAP/TD IMMUNIZATION (3 - Td or Tdap) 03/03/2033    DEXA  04/04/2037    INFLUENZA VACCINE  Completed    Pneumococcal Vaccine: 65+ Years  Completed    ZOSTER IMMUNIZATION  Completed    HPV IMMUNIZATION  Aged Out    MENINGITIS IMMUNIZATION  Aged Out    RSV MONOCLONAL ANTIBODY  Aged Out    MAMMO SCREENING  Discontinued    COLORECTAL CANCER SCREENING  Discontinued       Patient has been advised of split billing requirements and indicates understanding: Yes    Appropriate preventive services were discussed with this patient, including applicable screening as appropriate for fall prevention,  "nutrition, physical activity, Tobacco-use cessation, weight loss and cognition.  Checklist reviewing preventive services available has been given to the patient.        Review of Systems   HENT:  Positive for ear pain.    Respiratory:  Positive for cough.           Objective    BP (!) 152/86   Pulse 76   Temp 98.6  F (37  C) (Oral)   Resp 12   Ht 1.499 m (4' 11\")   Wt 65.8 kg (145 lb)   SpO2 94%   BMI 29.29 kg/m    Body mass index is 29.29 kg/m .  Physical Exam  Vitals and nursing note reviewed.   Constitutional:       General: She is not in acute distress.     Appearance: She is not ill-appearing.   HENT:      Left Ear: Ear canal and external ear normal.      Nose: Nose normal.      Mouth/Throat:      Mouth: Mucous membranes are moist.   Eyes:      General:         Right eye: No discharge.         Left eye: No discharge.   Cardiovascular:      Rate and Rhythm: Normal rate.   Pulmonary:      Effort: Pulmonary effort is normal.      Breath sounds: Normal breath sounds.   Abdominal:      General: There is no distension.      Palpations: Abdomen is soft. There is no mass.      Tenderness: There is no guarding or rebound.   Musculoskeletal:      Cervical back: Normal range of motion. No rigidity.      Right lower leg: No edema.      Left lower leg: No edema.   Lymphadenopathy:      Cervical: No cervical adenopathy.   Skin:     General: Skin is warm.      Capillary Refill: Capillary refill takes less than 2 seconds.   Neurological:      General: No focal deficit present.      Mental Status: She is alert and oriented to person, place, and time.      Sensory: Sensation is intact.      Motor: No weakness.      Comments: Little shuffling noted from chair to office bed.    Psychiatric:         Mood and Affect: Mood normal.         Behavior: Behavior normal.         Thought Content: Thought content normal.         Judgment: Judgment normal.                              "

## 2023-12-25 PROBLEM — M72.2 PLANTAR FASCIITIS: Status: ACTIVE | Noted: 2019-11-25

## 2023-12-25 PROBLEM — M54.50 LOWER BACK PAIN: Status: ACTIVE | Noted: 2023-12-25

## 2023-12-25 PROBLEM — L40.9 PSORIASIS: Status: ACTIVE | Noted: 2023-12-25

## 2023-12-25 PROBLEM — E78.5 DYSLIPIDEMIA: Status: ACTIVE | Noted: 2023-12-25

## 2023-12-25 PROBLEM — M24.573 EQUINUS CONTRACTURE OF ANKLE: Status: ACTIVE | Noted: 2019-11-25

## 2023-12-25 PROBLEM — K64.8 INTERNAL HEMORRHOIDS: Status: ACTIVE | Noted: 2023-12-25

## 2023-12-28 ENCOUNTER — TELEPHONE (OUTPATIENT)
Dept: FAMILY MEDICINE | Facility: CLINIC | Age: 78
End: 2023-12-28
Payer: COMMERCIAL

## 2023-12-28 DIAGNOSIS — R05.2 SUBACUTE COUGH: Primary | ICD-10-CM

## 2023-12-28 NOTE — TELEPHONE ENCOUNTER
General Call      Reason for Call:  Pt saw Dr. Hernandez on 12/22 and mentioned trying cough suppressants possibly Titi WEAVER, pt has to sleep in recliner due to cough. Medication wasn't prescribed per pt after provider wrote it down. Pt requesting fill, routing to tomorrow's covering provider since wb covering H. Slots is gone for the day. Pt hoping to get before the weekend.         Could we send this information to you in [email or web address] or would you prefer to receive a phone call?:   Patient would prefer a phone call   Okay to leave a detailed message?: Yes at Cell number on file:    Telephone Information:   Mobile [phone]

## 2023-12-29 RX ORDER — BENZONATATE 100 MG/1
100 CAPSULE ORAL 3 TIMES DAILY PRN
Qty: 30 CAPSULE | Refills: 1 | Status: SHIPPED | OUTPATIENT
Start: 2023-12-29

## 2023-12-29 NOTE — TELEPHONE ENCOUNTER
Per review of 12/22/2023 encounter- no notation of provider mentioning RX for cough.    Did PCP discuss RX with pt?     Audrey Obrien RN

## 2024-04-10 DIAGNOSIS — I10 ESSENTIAL HYPERTENSION: ICD-10-CM

## 2024-04-10 DIAGNOSIS — F32.5 MAJOR DEPRESSION IN REMISSION (H): ICD-10-CM

## 2024-04-11 RX ORDER — CITALOPRAM HYDROBROMIDE 20 MG/1
20 TABLET ORAL DAILY
Qty: 90 TABLET | Refills: 3 | Status: SHIPPED | OUTPATIENT
Start: 2024-04-11

## 2024-04-11 RX ORDER — LOSARTAN POTASSIUM 25 MG/1
25 TABLET ORAL DAILY
Qty: 90 TABLET | Refills: 0 | Status: SHIPPED | OUTPATIENT
Start: 2024-04-11 | End: 2024-07-22

## 2024-05-28 DIAGNOSIS — C50.912 INVASIVE DUCTAL CARCINOMA OF BREAST, STAGE 1, LEFT (H): ICD-10-CM

## 2024-05-28 RX ORDER — ANASTROZOLE 1 MG/1
1 TABLET ORAL DAILY
Qty: 90 TABLET | Refills: 1 | Status: SHIPPED | OUTPATIENT
Start: 2024-05-28

## 2024-05-28 NOTE — TELEPHONE ENCOUNTER
Therapy started 10/2021    Last Written Prescription Date:  11/24/23  Last Fill Quantity: 90,  # refills: 0   Last office visit provider:  12/4/23 with Dr. Cantu, recommended follow up in 1 year     Requested Prescriptions   Pending Prescriptions Disp Refills    anastrozole (ARIMIDEX) 1 MG tablet [Pharmacy Med Name: Anastrozole Oral Tablet 1 MG] 90 tablet 0     Sig: TAKE ONE TABLET BY MOUTH ONE TIME DAILY       There is no refill protocol information for this order          Shante George RN 05/28/24 8:58 AM

## 2024-05-29 DIAGNOSIS — I10 ESSENTIAL HYPERTENSION: ICD-10-CM

## 2024-05-29 RX ORDER — HYDROCHLOROTHIAZIDE 25 MG/1
25 TABLET ORAL DAILY
Qty: 90 TABLET | Refills: 0 | Status: SHIPPED | OUTPATIENT
Start: 2024-05-29 | End: 2024-08-26

## 2024-05-29 NOTE — TELEPHONE ENCOUNTER
Pending Prescriptions:                       Disp   Refills    hydrochlorothiazide (HYDRODIURIL) 25 MG t*90 tab*3            Sig: Take 1 tablet (25 mg) by mouth daily    Pharmacy: [facility][id number] - [facility] [address]

## 2024-07-08 ENCOUNTER — PATIENT OUTREACH (OUTPATIENT)
Dept: CARE COORDINATION | Facility: CLINIC | Age: 79
End: 2024-07-08
Payer: COMMERCIAL

## 2024-07-22 DIAGNOSIS — I10 ESSENTIAL HYPERTENSION: ICD-10-CM

## 2024-07-22 RX ORDER — LOSARTAN POTASSIUM 25 MG/1
25 TABLET ORAL DAILY
Qty: 30 TABLET | Refills: 0 | Status: SHIPPED | OUTPATIENT
Start: 2024-07-22 | End: 2024-08-19

## 2024-08-13 ENCOUNTER — HOSPITAL ENCOUNTER (OUTPATIENT)
Dept: MAMMOGRAPHY | Facility: CLINIC | Age: 79
Discharge: HOME | End: 2024-08-13
Admitting: NURSE PRACTITIONER
Payer: COMMERCIAL

## 2024-08-13 DIAGNOSIS — Z12.31 VISIT FOR SCREENING MAMMOGRAM: ICD-10-CM

## 2024-08-13 PROCEDURE — 77063 BREAST TOMOSYNTHESIS BI: CPT

## 2024-08-19 DIAGNOSIS — I10 ESSENTIAL HYPERTENSION: ICD-10-CM

## 2024-08-19 RX ORDER — LOSARTAN POTASSIUM 25 MG/1
25 TABLET ORAL DAILY
Qty: 30 TABLET | Refills: 0 | Status: SHIPPED | OUTPATIENT
Start: 2024-08-19 | End: 2024-08-26

## 2024-08-26 ENCOUNTER — OFFICE VISIT (OUTPATIENT)
Dept: FAMILY MEDICINE | Facility: CLINIC | Age: 79
End: 2024-08-26
Payer: COMMERCIAL

## 2024-08-26 VITALS
WEIGHT: 145 LBS | DIASTOLIC BLOOD PRESSURE: 77 MMHG | HEIGHT: 59 IN | RESPIRATION RATE: 14 BRPM | OXYGEN SATURATION: 95 % | BODY MASS INDEX: 29.23 KG/M2 | HEART RATE: 80 BPM | TEMPERATURE: 98.4 F | SYSTOLIC BLOOD PRESSURE: 151 MMHG

## 2024-08-26 DIAGNOSIS — E78.5 DYSLIPIDEMIA: ICD-10-CM

## 2024-08-26 DIAGNOSIS — H92.02 LEFT EAR PAIN: ICD-10-CM

## 2024-08-26 DIAGNOSIS — C50.912 INVASIVE DUCTAL CARCINOMA OF BREAST, LEFT (H): ICD-10-CM

## 2024-08-26 DIAGNOSIS — F32.5 MAJOR DEPRESSION IN REMISSION (H): ICD-10-CM

## 2024-08-26 DIAGNOSIS — I10 ESSENTIAL HYPERTENSION: ICD-10-CM

## 2024-08-26 DIAGNOSIS — I10 ESSENTIAL HYPERTENSION: Primary | ICD-10-CM

## 2024-08-26 DIAGNOSIS — L40.9 PSORIASIS: ICD-10-CM

## 2024-08-26 PROCEDURE — 84443 ASSAY THYROID STIM HORMONE: CPT | Performed by: NURSE PRACTITIONER

## 2024-08-26 PROCEDURE — 36415 COLL VENOUS BLD VENIPUNCTURE: CPT | Performed by: NURSE PRACTITIONER

## 2024-08-26 PROCEDURE — G2211 COMPLEX E/M VISIT ADD ON: HCPCS | Performed by: NURSE PRACTITIONER

## 2024-08-26 PROCEDURE — 99214 OFFICE O/P EST MOD 30 MIN: CPT | Performed by: NURSE PRACTITIONER

## 2024-08-26 PROCEDURE — 80053 COMPREHEN METABOLIC PANEL: CPT | Performed by: NURSE PRACTITIONER

## 2024-08-26 PROCEDURE — 80061 LIPID PANEL: CPT | Performed by: NURSE PRACTITIONER

## 2024-08-26 RX ORDER — HYDROCHLOROTHIAZIDE 25 MG/1
25 TABLET ORAL DAILY
Qty: 90 TABLET | Refills: 0 | Status: SHIPPED | OUTPATIENT
Start: 2024-08-26

## 2024-08-26 RX ORDER — OLMESARTAN MEDOXOMIL 5 MG/1
5 TABLET ORAL DAILY
Qty: 90 TABLET | Refills: 1 | Status: SHIPPED | OUTPATIENT
Start: 2024-08-26

## 2024-08-26 RX ORDER — KETOCONAZOLE 20 MG/ML
SHAMPOO TOPICAL DAILY PRN
Qty: 120 ML | Refills: 3 | Status: SHIPPED | OUTPATIENT
Start: 2024-08-26

## 2024-08-26 ASSESSMENT — PATIENT HEALTH QUESTIONNAIRE - PHQ9
SUM OF ALL RESPONSES TO PHQ QUESTIONS 1-9: 0
SUM OF ALL RESPONSES TO PHQ QUESTIONS 1-9: 0
10. IF YOU CHECKED OFF ANY PROBLEMS, HOW DIFFICULT HAVE THESE PROBLEMS MADE IT FOR YOU TO DO YOUR WORK, TAKE CARE OF THINGS AT HOME, OR GET ALONG WITH OTHER PEOPLE: NOT DIFFICULT AT ALL

## 2024-08-26 NOTE — PROGRESS NOTES
Assessment & Plan     Essential hypertension  **  - Comprehensive metabolic panel (BMP + Alb, Alk Phos, ALT, AST, Total. Bili, TP)  - TSH  - olmesartan (BENICAR) 5 MG tablet  Dispense: 90 tablet; Refill: 1  - Comprehensive metabolic panel (BMP + Alb, Alk Phos, ALT, AST, Total. 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 Will try a longer acting ARB and reassess BP in 2 weeks with nurses. Goal is below 130/80. If elevated, can increase dose to 10 mg  per day.   - shampoo helps with the scalp.   - per patient, depression is stable. Can continue Celexa. I offered 10 mg dose and she declined.   - will continue to follow oncology.   - can see ENT if ear still bothers you. Please try an anti-histamine or a steroid nasal spray. Access if related to your TMJ as well.   - kidney and liver function is stable. TSH is normal range.   - Your total cholesterol is elevated. According to AHA guidelines, they suggest you increase your statin medication to help lower your chances of a heart attack or stroke in the future. This med helps stabilize the plaque in your vessels. I will go ahead and order you the next dose up, which is 40 mg per day. Please take it once a day. Make sure you continue to eat a low saturated fat diet and try to exercise at least 150 minutes per week. The mediterranean diet is a good resource if you are looking into options about other ways to eat healthier.     Any questions, please let me know.     The longitudinal plan of  "care for the diagnosis(es)/condition(s) as documented were addressed during this visit. Due to the added complexity in care, I will continue to support Ariana in the subsequent management and with ongoing continuity of care.      The 10-year ASCVD risk score (Gentry BROOKS, et al., 2019) is: 36.9%    Values used to calculate the score:      Age: 78 years      Sex: Female      Is Non- : No      Diabetic: No      Tobacco smoker: No      Systolic Blood Pressure: 151 mmHg      Is BP treated: Yes      HDL Cholesterol: 53 mg/dL      Total Cholesterol: 209 mg/dL      BMI  Estimated body mass index is 29.29 kg/m  as calculated from the following:    Height as of this encounter: 1.499 m (4' 11\").    Weight as of this encounter: 65.8 kg (145 lb).   Weight management plan: Discussed healthy diet and exercise guidelines        Subjective   Ariana is a 78 year old, presenting for the following health issues:  Recheck Medication and Refill Request      8/26/2024     2:11 PM   Additional Questions   Roomed by Pollo     - can have left ear pain, on-going over a year. Low grade pain, intermittent, but sometimes more sharp and can last over a minute. Chronic hearing loss in left ear, has hearing aides. No itches or drainage. Has \"constant\" runny nose. Used to take allergy meds. Has TMJ and wears a brace. People tell her to relax her shoulders a lot. Goes to a massage therapist. Has seen the audiologist recently. Has mild vertigo, intermittent, not worse. Feels her depression is stable on Celexa. Tried to stop it, but her depression returned.   - takes bp medications as directed.     History of Present Illness       Reason for visit:  General checkup required by doctor   She is taking medications regularly.                     Objective    There were no vitals taken for this visit.  There is no height or weight on file to calculate BMI.  Physical Exam               Signed Electronically by: TUSHAR Clark " CNP

## 2024-08-26 NOTE — PATIENT INSTRUCTIONS
Stop losartan, start olmestartan, this is a longer acting BP med. Check BP in 2 weeks with staff, bring in Bp Cuff.     Start flonase or claritin for allergies.     Stop TMJ clenching!

## 2024-08-27 LAB
ALBUMIN SERPL BCG-MCNC: 4.5 G/DL (ref 3.5–5.2)
ALP SERPL-CCNC: 79 U/L (ref 40–150)
ALT SERPL W P-5'-P-CCNC: 33 U/L (ref 0–50)
ANION GAP SERPL CALCULATED.3IONS-SCNC: 11 MMOL/L (ref 7–15)
AST SERPL W P-5'-P-CCNC: 29 U/L (ref 0–45)
BILIRUB SERPL-MCNC: 0.7 MG/DL
BUN SERPL-MCNC: 10.8 MG/DL (ref 8–23)
CALCIUM SERPL-MCNC: 9.5 MG/DL (ref 8.8–10.4)
CHLORIDE SERPL-SCNC: 101 MMOL/L (ref 98–107)
CHOLEST SERPL-MCNC: 209 MG/DL
CREAT SERPL-MCNC: 0.78 MG/DL (ref 0.51–0.95)
EGFRCR SERPLBLD CKD-EPI 2021: 77 ML/MIN/1.73M2
FASTING STATUS PATIENT QL REPORTED: ABNORMAL
FASTING STATUS PATIENT QL REPORTED: NORMAL
GLUCOSE SERPL-MCNC: 90 MG/DL (ref 70–99)
HCO3 SERPL-SCNC: 28 MMOL/L (ref 22–29)
HDLC SERPL-MCNC: 53 MG/DL
LDLC SERPL CALC-MCNC: 108 MG/DL
NONHDLC SERPL-MCNC: 156 MG/DL
POTASSIUM SERPL-SCNC: 3.9 MMOL/L (ref 3.4–5.3)
PROT SERPL-MCNC: 7.1 G/DL (ref 6.4–8.3)
SODIUM SERPL-SCNC: 140 MMOL/L (ref 135–145)
TRIGL SERPL-MCNC: 238 MG/DL
TSH SERPL DL<=0.005 MIU/L-ACNC: 0.96 UIU/ML (ref 0.3–4.2)

## 2024-08-27 RX ORDER — ATORVASTATIN CALCIUM 40 MG/1
40 TABLET, FILM COATED ORAL DAILY
Qty: 90 TABLET | Refills: 3 | Status: SHIPPED | OUTPATIENT
Start: 2024-08-27

## 2024-08-27 NOTE — RESULT ENCOUNTER NOTE
JOHN Lane    It was good to see you again. Make sure you come back in 2 weeks to see one of our staff for a BP check and bring in your cuff at home.     Your kidney and liver function is stable. TSH is normal range.     - Your total cholesterol is elevated. According to AHA guidelines, they suggest you increase your statin medication to help lower your chances of a heart attack or stroke in the future. This med helps stabilize the plaque in your vessels. I will go ahead and order you the next dose up, which is 40 mg per day. Please take it once a day. Make sure you continue to eat a low saturated fat diet and try to exercise at least 150 minutes per week. The mediterranean diet is a good resource if you are looking into options about other ways to eat healthier.       Any questions, please let me know.     Almita

## 2024-09-11 ENCOUNTER — TELEPHONE (OUTPATIENT)
Dept: FAMILY MEDICINE | Facility: CLINIC | Age: 79
End: 2024-09-11
Payer: COMMERCIAL

## 2024-09-11 NOTE — TELEPHONE ENCOUNTER
Needs clarification on medication    Pt called in stating she got MyChart message from you telling her to increase her Olmesartan to 20 mg daily. I don't see that message in her chart. Pt is asking for a refill on that medication to show 20 mg daily.  Can you clarify that is what you want and send the new dose to her  pharmacy.  [facility][id number] [place], [place] - [address]   Pt has appointment for this 9/16/24 with nurse visit to check BP

## 2024-09-12 NOTE — TELEPHONE ENCOUNTER
Looks like I told her to start 5 mg per day, and then see the nurse, or at least tell me how her BP is doing on that dose. If she has her own cuff and noticing it higher above goal of 130/80, she can increase to 10 mg per day.     Almita

## 2024-09-12 NOTE — TELEPHONE ENCOUNTER
Writer relayed PCP message below. Pt to stay on Olmesartan 5 mg until 09/16/2024 RN visit for BP check.     Audrey Obrien RN

## 2024-11-21 DIAGNOSIS — I10 ESSENTIAL HYPERTENSION: ICD-10-CM

## 2024-11-21 RX ORDER — HYDROCHLOROTHIAZIDE 25 MG/1
25 TABLET ORAL DAILY
Qty: 90 TABLET | Refills: 2 | Status: SHIPPED | OUTPATIENT
Start: 2024-11-21

## 2024-12-16 ENCOUNTER — ONCOLOGY VISIT (OUTPATIENT)
Dept: ONCOLOGY | Facility: HOSPITAL | Age: 79
End: 2024-12-16
Attending: INTERNAL MEDICINE
Payer: COMMERCIAL

## 2024-12-16 VITALS
DIASTOLIC BLOOD PRESSURE: 73 MMHG | OXYGEN SATURATION: 97 % | BODY MASS INDEX: 29.84 KG/M2 | TEMPERATURE: 97.8 F | SYSTOLIC BLOOD PRESSURE: 129 MMHG | WEIGHT: 148 LBS | RESPIRATION RATE: 16 BRPM | HEART RATE: 66 BPM | HEIGHT: 59 IN

## 2024-12-16 DIAGNOSIS — Z79.811 LONG TERM (CURRENT) USE OF AROMATASE INHIBITORS: ICD-10-CM

## 2024-12-16 DIAGNOSIS — C50.912 INVASIVE DUCTAL CARCINOMA OF BREAST, LEFT (H): Primary | ICD-10-CM

## 2024-12-16 PROCEDURE — G2211 COMPLEX E/M VISIT ADD ON: HCPCS | Performed by: INTERNAL MEDICINE

## 2024-12-16 PROCEDURE — G0463 HOSPITAL OUTPT CLINIC VISIT: HCPCS | Performed by: INTERNAL MEDICINE

## 2024-12-16 PROCEDURE — 99213 OFFICE O/P EST LOW 20 MIN: CPT | Performed by: INTERNAL MEDICINE

## 2024-12-16 ASSESSMENT — PAIN SCALES - GENERAL: PAINLEVEL_OUTOF10: NO PAIN (0)

## 2024-12-16 NOTE — LETTER
"12/16/2024      Ariana Hodge  [address] [place] [address]      Dear Colleague,    Thank you for referring your patient, Ariana Hodge, to the [facility]. Please see a copy of my visit note below.    Oncology Rooming Note    December 16, 2024 3:29 PM   Ariana Hodge is a 79 year old female who presents for:    Chief Complaint   Patient presents with     Oncology Clinic Visit     Invasive ductal carcinoma of breast, left      Initial Vitals: /73 (BP Location: Left arm, Patient Position: Sitting, Cuff Size: Adult Regular)   Pulse 66   Temp 97.8  F (36.6  C) (Tympanic)   Resp 16   Ht 1.499 m (4' 11\")   Wt 67.1 kg (148 lb)   SpO2 97%   BMI 29.89 kg/m   Estimated body mass index is 29.89 kg/m  as calculated from the following:    Height as of this encounter: 1.499 m (4' 11\").    Weight as of this encounter: 67.1 kg (148 lb). Body surface area is 1.67 meters squared.  No Pain (0) Comment: Data Unavailable   No LMP recorded. Patient is postmenopausal.  Allergies reviewed: Yes  Medications reviewed: Yes    Medications: Medication refills not needed today.  Pharmacy name entered into [employer]: [facility] #[id number] - [place], [place] - [address] [facility] [address]    Frailty Screening:   Is the patient here for a new oncology consult visit in cancer care? 2. No      Clinical concerns:  1 year follow up      Iris Obrien              [facility] Hematology and Oncology Progress Note    Patient: Ariana Hodge  MRN: [id number]  Date of Service: Dec 16, 2024        Assessment and Plan:    1. Invasive ductal carcinoma: She continues on Arimidex.  Generally tolerating well.  She will continue for another 1 year 10 months.  Will see her back in clinic in a years time.  She had a mammogram in August which was normal.  These will be continued yearly.    She is overdue for a bone density test.  We will order 1 now to be done in the near future.  She is taking calcium and vitamin D.  Her " bone density from April 2022 was normal.     ECOG Performance  0    Diagnosis:    1. Invasive ductal carcinoma of the left breast: Diagnosed August 2021. Upper inner quadrant. Grade 2, ER/DC positive, HER-2/josh negative. Tumor size was 15 x 13 x 8 mm. 2 sentinel lymph nodes were negative.  Oncotype recurrence score is 27 (16%).    Treatment:    Breast conservation surgery performed on September 7, 2021.  Arimidex started mid October 2021.    Interim History:    Ariana returns today for follow-up visit. Still is on anastrozole. No vasomotor symptoms. No joint or muscle complaints.     Review of Systems:    As above in the history.     Review of Systems otherwise Negative for:  General: chills, fever or night sweats  Psychological: anxiety or depression  Ophthalmic: blurry vision, double vision or loss of vision, vision change  ENT: epistaxis, oral lesions, hearing changes  Hematological and Lymphatic: bleeding, bruising, jaundice, swollen lymph nodes  Endocrine: hot flashes, unexpected weight changes  Respiratory: cough, hemoptysis, orthopnea or shortness of breath/CHU  Cardiovascular: chest pain, edema, palpitations or PND  Gastrointestinal: abdominal pain, blood in stools, change in bowel habits, constipation, diarrhea or nausea/vomiting  Genito-Urinary: change in urinary stream, incontinence, frequency/urgency  Musculoskeletal: joint pain, stiffness, swelling, muscle pain  Neurological: dizziness, headaches, numbness/tingling  Dermatological: lumps and rash    Past History:    Past Medical History:   Diagnosis Date     CHF (congestive heart failure) (H) 2/26/2020     Gastroesophageal reflux disease      Hypertension      Inverted nipple     both breasts ever since she had her breast reduction surgery     Reflux esophagitis      Skin cancer      Physical Exam:    /73 (BP Location: Left arm, Patient Position: Sitting, Cuff Size: Adult Regular)   Pulse 66   Temp 97.8  F (36.6  C) (Tympanic)   Resp 16   Ht  "1.499 m (4' 11\")   Wt 67.1 kg (148 lb)   SpO2 97%   BMI 29.89 kg/m      General: patient appears stated age of 76 year old. Nontoxic and in no distress.   HEENT: Head: atraumatic, normocephalic. Sclerae anicteric.  Chest:  Normal respiratory effort  Cardiac:  No edema.   Abdomen: abdomen is non-distended  Extremities: normal tone and muscle bulk.  Skin: no lesions or rash on visible skin. Warm and dry.   CNS: alert and oriented. Grossly non-focal.   Psychiatric: normal mood and affect.     Lab Results:    No results found for this or any previous visit (from the past week).     Signed by: Tommy Caputo MD        Again, thank you for allowing me to participate in the care of your patient.        Sincerely,        Tommy Caputo MD  "

## 2024-12-16 NOTE — PROGRESS NOTES
"Oncology Rooming Note    December 16, 2024 3:29 PM   Ariana Hodge is a 79 year old female who presents for:    Chief Complaint   Patient presents with    Oncology Clinic Visit     Invasive ductal carcinoma of breast, left      Initial Vitals: /73 (BP Location: Left arm, Patient Position: Sitting, Cuff Size: Adult Regular)   Pulse 66   Temp 97.8  F (36.6  C) (Tympanic)   Resp 16   Ht 1.499 m (4' 11\")   Wt 67.1 kg (148 lb)   SpO2 97%   BMI 29.89 kg/m   Estimated body mass index is 29.89 kg/m  as calculated from the following:    Height as of this encounter: 1.499 m (4' 11\").    Weight as of this encounter: 67.1 kg (148 lb). Body surface area is 1.67 meters squared.  No Pain (0) Comment: Data Unavailable   No LMP recorded. Patient is postmenopausal.  Allergies reviewed: Yes  Medications reviewed: Yes    Medications: Medication refills not needed today.  Pharmacy name entered into [facility]: [facility] #[id number] [place], [place] - [address]    Frailty Screening:   Is the patient here for a new oncology consult visit in cancer care? 2. No      Clinical concerns:  1 year follow up      Iris Obrien            "

## 2024-12-16 NOTE — PROGRESS NOTES
[facility] Hematology and Oncology Progress Note    Patient: Ariana Hodge  MRN: [id number]  Date of Service: Dec 16, 2024        Assessment and Plan:    1. Invasive ductal carcinoma: She continues on Arimidex.  Generally tolerating well.  She will continue for another 1 year 10 months.  Will see her back in clinic in a years time.  She had a mammogram in August which was normal.  These will be continued yearly.    She is overdue for a bone density test.  We will order 1 now to be done in the near future.  She is taking calcium and vitamin D.  Her bone density from April 2022 was normal.     ECOG Performance  0    Diagnosis:    1. Invasive ductal carcinoma of the left breast: Diagnosed August 2021. Upper inner quadrant. Grade 2, ER/AZ positive, HER-2/josh negative. Tumor size was 15 x 13 x 8 mm. 2 sentinel lymph nodes were negative.  Oncotype recurrence score is 27 (16%).    Treatment:    Breast conservation surgery performed on September 7, 2021.  Arimidex started mid October 2021.    Interim History:    Ariana returns today for follow-up visit. Still is on anastrozole. No vasomotor symptoms. No joint or muscle complaints.     Review of Systems:    As above in the history.     Review of Systems otherwise Negative for:  General: chills, fever or night sweats  Psychological: anxiety or depression  Ophthalmic: blurry vision, double vision or loss of vision, vision change  ENT: epistaxis, oral lesions, hearing changes  Hematological and Lymphatic: bleeding, bruising, jaundice, swollen lymph nodes  Endocrine: hot flashes, unexpected weight changes  Respiratory: cough, hemoptysis, orthopnea or shortness of breath/CHU  Cardiovascular: chest pain, edema, palpitations or PND  Gastrointestinal: abdominal pain, blood in stools, change in bowel habits, constipation, diarrhea or nausea/vomiting  Genito-Urinary: change in urinary stream, incontinence, frequency/urgency  Musculoskeletal: joint pain, stiffness, swelling,  "muscle pain  Neurological: dizziness, headaches, numbness/tingling  Dermatological: lumps and rash    Past History:    Past Medical History:   Diagnosis Date    CHF (congestive heart failure) (H) 2/26/2020    Gastroesophageal reflux disease     Hypertension     Inverted nipple     both breasts ever since she had her breast reduction surgery    Reflux esophagitis     Skin cancer      Physical Exam:    /73 (BP Location: Left arm, Patient Position: Sitting, Cuff Size: Adult Regular)   Pulse 66   Temp 97.8  F (36.6  C) (Tympanic)   Resp 16   Ht 1.499 m (4' 11\")   Wt 67.1 kg (148 lb)   SpO2 97%   BMI 29.89 kg/m      General: patient appears stated age of 76 year old. Nontoxic and in no distress.   HEENT: Head: atraumatic, normocephalic. Sclerae anicteric.  Chest:  Normal respiratory effort  Cardiac:  No edema.   Abdomen: abdomen is non-distended  Extremities: normal tone and muscle bulk.  Skin: no lesions or rash on visible skin. Warm and dry.   CNS: alert and oriented. Grossly non-focal.   Psychiatric: normal mood and affect.     Lab Results:    No results found for this or any previous visit (from the past week).     Signed by: Tommy Caputo MD      "

## 2025-01-20 ENCOUNTER — ANCILLARY PROCEDURE (OUTPATIENT)
Dept: BONE DENSITY | Facility: CLINIC | Age: 80
End: 2025-01-20
Attending: INTERNAL MEDICINE
Payer: COMMERCIAL

## 2025-01-20 DIAGNOSIS — Z79.811 LONG TERM (CURRENT) USE OF AROMATASE INHIBITORS: ICD-10-CM

## 2025-01-20 PROCEDURE — 77080 DXA BONE DENSITY AXIAL: CPT | Mod: TC | Performed by: PHYSICIAN ASSISTANT

## 2025-01-20 PROCEDURE — 77091 TBS TECHL CALCULATION ONLY: CPT | Performed by: PHYSICIAN ASSISTANT

## 2025-01-25 ENCOUNTER — HEALTH MAINTENANCE LETTER (OUTPATIENT)
Age: 80
End: 2025-01-25

## 2025-02-20 DIAGNOSIS — C50.912 INVASIVE DUCTAL CARCINOMA OF BREAST, STAGE 1, LEFT (H): ICD-10-CM

## 2025-02-20 RX ORDER — ANASTROZOLE 1 MG/1
1 TABLET ORAL DAILY
Qty: 90 TABLET | Refills: 0 | Status: SHIPPED | OUTPATIENT
Start: 2025-02-20

## 2025-03-25 ENCOUNTER — OFFICE VISIT (OUTPATIENT)
Dept: FAMILY MEDICINE | Facility: CLINIC | Age: 80
End: 2025-03-25
Payer: COMMERCIAL

## 2025-03-25 VITALS
BODY MASS INDEX: 29.89 KG/M2 | SYSTOLIC BLOOD PRESSURE: 136 MMHG | TEMPERATURE: 98.4 F | OXYGEN SATURATION: 98 % | DIASTOLIC BLOOD PRESSURE: 82 MMHG | WEIGHT: 148 LBS | RESPIRATION RATE: 14 BRPM | HEART RATE: 71 BPM

## 2025-03-25 DIAGNOSIS — Z29.11 NEED FOR VACCINATION AGAINST RESPIRATORY SYNCYTIAL VIRUS: ICD-10-CM

## 2025-03-25 DIAGNOSIS — Z13.1 SCREENING FOR DIABETES MELLITUS: ICD-10-CM

## 2025-03-25 DIAGNOSIS — F32.5 MAJOR DEPRESSION IN REMISSION: ICD-10-CM

## 2025-03-25 DIAGNOSIS — R20.2 NUMBNESS AND TINGLING OF BOTH LOWER EXTREMITIES: ICD-10-CM

## 2025-03-25 DIAGNOSIS — M54.42 CHRONIC LEFT-SIDED LOW BACK PAIN WITH LEFT-SIDED SCIATICA: ICD-10-CM

## 2025-03-25 DIAGNOSIS — I10 ESSENTIAL HYPERTENSION: ICD-10-CM

## 2025-03-25 DIAGNOSIS — C50.912 INVASIVE DUCTAL CARCINOMA OF BREAST, LEFT (H): ICD-10-CM

## 2025-03-25 DIAGNOSIS — R09.82 POST-NASAL DRAINAGE: Primary | ICD-10-CM

## 2025-03-25 DIAGNOSIS — R20.0 NUMBNESS AND TINGLING OF BOTH LOWER EXTREMITIES: ICD-10-CM

## 2025-03-25 DIAGNOSIS — G89.29 CHRONIC LEFT-SIDED LOW BACK PAIN WITH LEFT-SIDED SCIATICA: ICD-10-CM

## 2025-03-25 DIAGNOSIS — Z00.00 MEDICARE ANNUAL WELLNESS VISIT, SUBSEQUENT: Primary | ICD-10-CM

## 2025-03-25 DIAGNOSIS — R09.82 POST-NASAL DRAINAGE: ICD-10-CM

## 2025-03-25 DIAGNOSIS — E78.5 DYSLIPIDEMIA: ICD-10-CM

## 2025-03-25 LAB
EST. AVERAGE GLUCOSE BLD GHB EST-MCNC: 105 MG/DL
HBA1C MFR BLD: 5.3 % (ref 0–5.6)

## 2025-03-25 PROCEDURE — 80061 LIPID PANEL: CPT | Performed by: NURSE PRACTITIONER

## 2025-03-25 PROCEDURE — 3075F SYST BP GE 130 - 139MM HG: CPT | Performed by: NURSE PRACTITIONER

## 2025-03-25 PROCEDURE — G2211 COMPLEX E/M VISIT ADD ON: HCPCS | Performed by: NURSE PRACTITIONER

## 2025-03-25 PROCEDURE — 99214 OFFICE O/P EST MOD 30 MIN: CPT | Mod: 25 | Performed by: NURSE PRACTITIONER

## 2025-03-25 PROCEDURE — G0439 PPPS, SUBSEQ VISIT: HCPCS | Performed by: NURSE PRACTITIONER

## 2025-03-25 PROCEDURE — 82607 VITAMIN B-12: CPT | Performed by: NURSE PRACTITIONER

## 2025-03-25 PROCEDURE — 3079F DIAST BP 80-89 MM HG: CPT | Performed by: NURSE PRACTITIONER

## 2025-03-25 PROCEDURE — 36415 COLL VENOUS BLD VENIPUNCTURE: CPT | Performed by: NURSE PRACTITIONER

## 2025-03-25 PROCEDURE — 83036 HEMOGLOBIN GLYCOSYLATED A1C: CPT | Performed by: NURSE PRACTITIONER

## 2025-03-25 RX ORDER — FLUTICASONE PROPIONATE 50 MCG
1 SPRAY, SUSPENSION (ML) NASAL DAILY
Qty: 9.9 ML | Refills: 1 | Status: SHIPPED | OUTPATIENT
Start: 2025-03-25

## 2025-03-25 RX ORDER — HYDROCHLOROTHIAZIDE 25 MG/1
25 TABLET ORAL DAILY
Qty: 90 TABLET | Refills: 2 | Status: SHIPPED | OUTPATIENT
Start: 2025-03-25

## 2025-03-25 RX ORDER — LIDOCAINE 50 MG/G
1 PATCH TOPICAL EVERY 24 HOURS
Qty: 5 PATCH | Refills: 3 | Status: SHIPPED | OUTPATIENT
Start: 2025-03-25

## 2025-03-25 RX ORDER — CITALOPRAM HYDROBROMIDE 20 MG/1
20 TABLET ORAL DAILY
Qty: 90 TABLET | Refills: 3 | Status: SHIPPED | OUTPATIENT
Start: 2025-03-25

## 2025-03-25 RX ORDER — OLMESARTAN MEDOXOMIL 5 MG/1
5 TABLET ORAL DAILY
Qty: 90 TABLET | Refills: 1 | Status: SHIPPED | OUTPATIENT
Start: 2025-03-25

## 2025-03-25 SDOH — HEALTH STABILITY: PHYSICAL HEALTH: ON AVERAGE, HOW MANY DAYS PER WEEK DO YOU ENGAGE IN MODERATE TO STRENUOUS EXERCISE (LIKE A BRISK WALK)?: 0 DAYS

## 2025-03-25 ASSESSMENT — PATIENT HEALTH QUESTIONNAIRE - PHQ9
SUM OF ALL RESPONSES TO PHQ QUESTIONS 1-9: 1
10. IF YOU CHECKED OFF ANY PROBLEMS, HOW DIFFICULT HAVE THESE PROBLEMS MADE IT FOR YOU TO DO YOUR WORK, TAKE CARE OF THINGS AT HOME, OR GET ALONG WITH OTHER PEOPLE: NOT DIFFICULT AT ALL
SUM OF ALL RESPONSES TO PHQ QUESTIONS 1-9: 1

## 2025-03-25 ASSESSMENT — SOCIAL DETERMINANTS OF HEALTH (SDOH): HOW OFTEN DO YOU GET TOGETHER WITH FRIENDS OR RELATIVES?: THREE TIMES A WEEK

## 2025-03-25 NOTE — PROGRESS NOTES
Preventive Care Visit  [facility] TUSHAR Fowler CNP, Family Medicine  Mar 25, 2025  {Provider  Link to SmartSet :729162}    {PROVIDER CHARTING PREFERENCE:346727}    Erin Lane is a 79 year old, presenting for the following:  Physical (Mucus in the lungs, neuropathy ) and Recheck Medication        3/25/2025     2:52 PM   Additional Questions   Roomed by Pollo     {ROOMER if patient is in their first year of Medicare a vision screen is required click here to document the Vison screen and then refresh the note to pull in results  :339155}      HPI  ***   {MA/LPN/RN Pre-Provider Visit Orders- hCG/UA/Strep (Optional):461128}  {SUPERLIST (Optional):003018}  {additonal problems for provider to add (Optional):897898}  Advance Care Planning  Patient does not have a Health Care Directive: {ADVANCE_DIRECTIVE_STATUS:891085}      3/25/2025   General Health   How would you rate your overall physical health? (!) FAIR   Feel stress (tense, anxious, or unable to sleep) Not at all         3/25/2025   Nutrition   Diet: Regular (no restrictions)         3/25/2025   Exercise   Days per week of moderate/strenous exercise 0 days   (!) EXERCISE CONCERN      3/25/2025   Social Factors   Frequency of gathering with friends or relatives Three times a week   Worry food won't last until get money to buy more No   Food not last or not have enough money for food? No   Do you have housing? (Housing is defined as stable permanent housing and does not include staying ouside in a car, in a tent, in an abandoned building, in an overnight shelter, or couch-surfing.) Yes   Are you worried about losing your housing? No   Lack of transportation? No   Unable to get utilities (heat,electricity)? No         3/25/2025   Fall Risk   Fallen 2 or more times in the past year? Yes   Trouble with walking or balance? Yes   Gait Speed Test (Document in seconds) 4.08          3/25/2025   Activities of Daily Living- Home Safety    Needs help with the following daily activites None of the above   Safety concerns in the home None of the above         3/25/2025   Dental   Dentist two times every year? Yes         3/25/2025   Hearing Screening   Hearing concerns? (!) I NEED TO ASK PEOPLE TO SPEAK UP OR REPEAT THEMSELVES.    (!) IT'S HARD TO FOLLOW A CONVERSATION IN A NOISY RESTAURANT OR CROWDED ROOM.    (!) TROUBLE UNDESTANDING A SPEAKER IN A PUBLIC MEETING OR Spiritism SERVICE.    (!) TROUBLE FOLLOWING DIALOGUE IN THE THEATHER.    (!) TROUBLE UNDERSTANDING SOFT OR WHISPERED SPEECH.       Multiple values from one day are sorted in reverse-chronological order         3/25/2025   Driving Risk Screening   Patient/family members have concerns about driving (!) YES ***         3/25/2025   General Alertness/Fatigue Screening   Have you been more tired than usual lately? (!) YES         3/25/2025   Urinary Incontinence Screening   Bothered by leaking urine in past 6 months No         Today's PHQ-9 Score:       3/25/2025     2:42 PM   PHQ-9 SCORE   PHQ-9 Total Score MyChart 1 (Minimal depression)   PHQ-9 Total Score 1        Patient-reported         3/25/2025   Substance Use   Alcohol more than 3/day or more than 7/wk Yes   How often do you have a drink containing alcohol 2 to 3 times a week   How many alcohol drinks on typical day 3 or 4   How often do you have 5+ drinks at one occasion Less than monthly   Audit 2/3 Score 2   How often not able to stop drinking once started Never   How often failed to do what normally expected Never   How often needed first drink in am after a heavy drinking session Never   How often feeling of guilt or remorse after drinking Never   How often unable to remember what happened the night before Never   Have you or someone else been injured because of your drinking No   Has anyone been concerned or suggested you cut down on drinking No   TOTAL SCORE - AUDIT 5   Do you have a current opioid prescription? No   How  severe/bad is pain from 1 to 10? 2/10   Do you use any other substances recreationally? No     Social History     Tobacco Use    Smoking status: Never     Passive exposure: Never    Smokeless tobacco: Never   Vaping Use    Vaping status: Never Used   Substance Use Topics    Alcohol use: Yes     Comment: Wine daily    Drug use: Not Currently     {Provider  If there are gaps in the social history shown above, please follow the link to update and then refresh the note Link to Social and Substance History :585601}      8/13/2024   LAST FHS-7 RESULTS   1st degree relative breast or ovarian cancer No   Any relative bilateral breast cancer No   Any male have breast cancer No   Any ONE woman have BOTH breast AND ovarian cancer No   Any woman with breast cancer before 50yrs No   2 or more relatives with breast AND/OR ovarian cancer No   2 or more relatives with breast AND/OR bowel cancer No     {If any of the questions to the FHS7 are answered yes, consider referral for genetic counseling.    Additional indications for genetic referral include personal history of breast or ovarian cancer, genetic mutation in 1st degree relative which increases risk of breast cancer including BRCA1, BRCA2, HOANG, PALB 2, TP53, CHEK2, PTEN, CDH1, STK11 (per ACS) and/or 1st degree relative with history of pancreatic or high-risk prostate cancer (per NCCN):735497}   {Mammogram Decision Support (Optional):958157}    ASCVD Risk   The 10-year ASCVD risk score (Gentry BROOKS, et al., 2019) is: 39%    Values used to calculate the score:      Age: 79 years      Sex: Female      Is Non- : No      Diabetic: No      Tobacco smoker: No      Systolic Blood Pressure: 148 mmHg      Is BP treated: Yes      HDL Cholesterol: 53 mg/dL      Total Cholesterol: 209 mg/dL    {Link to Fracture Risk Assessment Tool (Optional):880852}    {Provider  REQUIRED FOR AWV Use the storyboard to review patient history, after sections have been  marked as reviewed, refresh note to capture documentation:044665}  {Provider   REQUIRED AWV use this link to review and update sexual activity history  after section has been marked as reviewed, refresh note to capture documentation:599398}  Reviewed and updated as needed this visit by Provider                    {HISTORY OPTIONS (Optional):742897}  Current providers sharing in care for this patient include:  Patient Care Team:  Rach Hernandez APRN CNP as PCP - General (Family Medicine)  Ashvin Alejandre PA-C as Physician Assistant (Physician Assistant)  Tommy Caputo MD (Internal Medicine)  Rach Hernandez APRN CNP as Assigned PCP  Tommy Caputo MD as Assigned Cancer Care Provider    The following health maintenance items are reviewed in Epic and correct as of today:  Health Maintenance   Topic Date Due    RSV VACCINE (1 - 1-dose 75+ series) Never done    MEDICARE ANNUAL WELLNESS VISIT  12/22/2024    ANNUAL REVIEW OF HM ORDERS  08/26/2025    COVID-19 Vaccine (6 - 2024-25 season) 04/07/2025    MAMMO SCREENING  08/13/2025    BMP  08/26/2025    LIPID  08/26/2025    PHQ-9  09/25/2025    FALL RISK ASSESSMENT  03/25/2026    DIABETES SCREENING  08/26/2027    ADVANCE CARE PLANNING  12/25/2028    DTAP/TDAP/TD IMMUNIZATION (3 - Td or Tdap) 03/03/2033    DEXA  01/20/2040    DEPRESSION ACTION PLAN  Completed    INFLUENZA VACCINE  Completed    Pneumococcal Vaccine: 50+ Years  Completed    ZOSTER IMMUNIZATION  Completed    HPV IMMUNIZATION  Aged Out    MENINGITIS IMMUNIZATION  Aged Out    TSH W/FREE T4 REFLEX  Discontinued    HEPATITIS C SCREENING  Discontinued    COLORECTAL CANCER SCREENING  Discontinued       {ROS Picklists (Optional):789043}     Objective    Exam  BP (!) 148/83   Pulse 71   Temp 98.4  F (36.9  C) (Oral)   Resp 14   Wt 67.1 kg (148 lb)   SpO2 98%   BMI 29.89 kg/m     Estimated body mass index is 29.89 kg/m  as calculated from the following:    Height as of 12/16/24: 1.499 m (4'  "11\").    Weight as of this encounter: 67.1 kg (148 lb).    Physical Exam  {Exam Choices (Optional):366112}        3/25/2025   Mini Cog   Clock Draw Score 2 Normal   3 Item Recall 3 objects recalled   Mini Cog Total Score 5     {A Mini-Cog total score of 0-2 suggests the possibility of dementia, score of 3-5 suggests no dementia:188488}         Signed Electronically by: TUSHAR Clark CNP  {Email feedback regarding this note to [email or web address]   :350523}  Answers submitted by the patient for this visit:  Patient Health Questionnaire (Submitted on 3/25/2025)  If you checked off any problems, how difficult have these problems made it for you to do your work, take care of things at home, or get along with other people?: Not difficult at all  PHQ9 TOTAL SCORE: 1    " " Completed    Pneumococcal Vaccine: 50+ Years  Completed    ZOSTER IMMUNIZATION  Completed    HPV IMMUNIZATION  Aged Out    MENINGITIS IMMUNIZATION  Aged Out    TSH W/FREE T4 REFLEX  Discontinued    HEPATITIS C SCREENING  Discontinued    COLORECTAL CANCER SCREENING  Discontinued         Review of Systems  Constitutional, HEENT, cardiovascular, pulmonary, gi and gu systems are negative, except as otherwise noted.     Objective    Exam  /82   Pulse 71   Temp 98.4  F (36.9  C) (Oral)   Resp 14   Wt 67.1 kg (148 lb)   SpO2 98%   BMI 29.89 kg/m     Estimated body mass index is 29.89 kg/m  as calculated from the following:    Height as of 12/16/24: 1.499 m (4' 11\").    Weight as of this encounter: 67.1 kg (148 lb).    Physical Exam  GENERAL: alert and no distress  EYES: Eyes grossly normal to inspection, PERRL and conjunctivae and sclerae normal  HENT: ear canals and TM's normal, nose and mouth without ulcers or lesions  RESP: lungs clear to auscultation - no rales, rhonchi or wheezes  CV: regular rate and rhythm, normal S1 S2, no S3 or S4, no murmur, click or rub, no peripheral edema  ABDOMEN: soft, nontender, no hepatosplenomegaly, no masses and bowel sounds normal  MS: no gross musculoskeletal defects noted, no edema  SKIN: no suspicious lesions or rashes  NEURO: Normal strength and tone, mentation intact and speech normal  PSYCH: mentation appears normal, affect normal/bright        3/25/2025   Mini Cog   Clock Draw Score 2 Normal   3 Item Recall 3 objects recalled   Mini Cog Total Score 5              Signed Electronically by: TUSHAR Clark CNP    Answers submitted by the patient for this visit:  Patient Health Questionnaire (Submitted on 3/25/2025)  If you checked off any problems, how difficult have these problems made it for you to do your work, take care of things at home, or get along with other people?: Not difficult at all  PHQ9 TOTAL SCORE: 1    "

## 2025-03-26 ENCOUNTER — TELEPHONE (OUTPATIENT)
Dept: FAMILY MEDICINE | Facility: CLINIC | Age: 80
End: 2025-03-26
Payer: COMMERCIAL

## 2025-03-26 LAB
CHOLEST SERPL-MCNC: 187 MG/DL
FASTING STATUS PATIENT QL REPORTED: NO
HDLC SERPL-MCNC: 50 MG/DL
LDLC SERPL CALC-MCNC: 102 MG/DL
NONHDLC SERPL-MCNC: 137 MG/DL
TRIGL SERPL-MCNC: 176 MG/DL
VIT B12 SERPL-MCNC: 509 PG/ML (ref 232–1245)

## 2025-03-26 NOTE — TELEPHONE ENCOUNTER
Prior Authorization  Please initiate PA, med/dosage not covered by insurance.    Medication: lidocaine (LIDODERM) 5 % patch     Insurance Name: [employer] MED[employer]ARE  Insurance ID: [id number]  Cover My Meds Key: [id number]    Pharmacy: [id number] [address]

## 2025-03-27 NOTE — TELEPHONE ENCOUNTER
PA Initiation    Medication: LIDOCAINE 5 % EX PTCH  Insurance Company: [employer] - Phone [phone] Fax [phone]  Pharmacy Filling the Rx: [facility] #[id number] [place], [place] - [address]  Filling Pharmacy Phone: [phone]  Filling Pharmacy Fax: [phone]  Start Date: 3/27/2025

## 2025-03-27 NOTE — RESULT ENCOUNTER NOTE
Amando Lane    Your cholesterol has improved. Good news. Your HDL continues to be in a good range.     Your A1c and b12 are normal.     If you have any questions, please let me know.     Almita  
2
Awake/Alert

## 2025-03-31 NOTE — TELEPHONE ENCOUNTER
Prior Authorization Approval    Medication: LIDOCAINE 5 % EX PTCH  Authorization Effective Date: 3/30/2025  Authorization Expiration Date: 3/30/2026  Reference #: [id number]   Insurance Company: [employer] - Phone [phone] Fax [phone]  Which Pharmacy is filling the prescription: [id number] - [place], [place] - [address]  Pharmacy Notified: YES  Patient Notified: YES

## 2025-04-22 ENCOUNTER — TELEPHONE (OUTPATIENT)
Dept: FAMILY MEDICINE | Facility: CLINIC | Age: 80
End: 2025-04-22
Payer: COMMERCIAL

## 2025-04-22 DIAGNOSIS — R05.9 COUGH, UNSPECIFIED TYPE: Primary | ICD-10-CM

## 2025-04-22 NOTE — TELEPHONE ENCOUNTER
Patient is deferring this to providers professional assessment. She states this is in her lungs.    Nothing she has done has been been effective. Mucinex, Flonase and steam have done nothing. Patient has gotten no sleep and has gotten worse.     Patients cough is quite bad on the phone. She would like to get in to see someone as soon as possible.    KAROLINA Weir  [facility]  [phone]    [facility]   Monday  - Thursday 7 AM - 6 PM    Friday  7 AM - 5 PM     -Please call your clinic for assistance from a nurse after hours.

## 2025-04-22 NOTE — TELEPHONE ENCOUNTER
Order/Referral Request    Who is requesting: pt    Orders being requested: referral for pulminary (issue with lungs)     Reason service is needed/diagnosis: excess mucus in chest, worsening, flonase is not working    When are orders needed by: ASAP    Has this been discussed with Provider: Yes    Does patient have a preference on a Group/Provider/Facility? N/a    Does patient have an appointment scheduled?: No    Where to send orders:  Pt would like a phone call    Could we send this information to you in [facility] or would you prefer to receive a phone call?:   Patient would prefer a phone call   Okay to leave a detailed message?: Yes at Cell number on file:    Telephone Information:   Mobile [phone]

## 2025-04-22 NOTE — TELEPHONE ENCOUNTER
Hi     We discussed post nasal drainage possibly causing her excess mucus with coughing. This is related to upper respiratory such as nasal passages, throat, etc. Would she like to see an allergist or ENT instead?    Almita

## 2025-04-22 NOTE — TELEPHONE ENCOUNTER
Contacts       Contact Date/Time Type Contact Phone/Fax    04/22/2025 11:27 AM CDT Phone (Incoming) Ariana Hodge (Self) [phone] (H)    04/22/2025 12:35 PM CDT Phone (Outgoing) Joyanickbeatrice Ariana L (Self) [phone] (H)    Left Message           Attempted to reach patient to: Collect additional information    Information needed: Provider would like to know if she would like to see an allergist or ENT instead?     When patient returns call, please take this action: OK to relay (verbatim): Would you like a referral to an allergist or ENT instead of pulmonologist?    KAROLINA Moya  [facility]

## 2025-04-23 ENCOUNTER — TELEPHONE (OUTPATIENT)
Dept: PULMONOLOGY | Facility: CLINIC | Age: 80
End: 2025-04-23
Payer: COMMERCIAL

## 2025-04-23 DIAGNOSIS — R05.9 COUGH: Primary | ICD-10-CM

## 2025-04-23 NOTE — TELEPHONE ENCOUNTER
[facility] Call Center    Phone Message    May a detailed message be left on voicemail: yes     Reason for Call: Appointment Intake    Referring Provider Name: Rach FINLEY CNP  Diagnosis and/or Symptoms: Ongoing cough    Pt is scheduled to establish care 5/9/25 with Lisette Sequeira NP, will have PFT's 5/6/25-needs orders placed for PFT's (pended).     Action Taken: Other: Pulm    Travel Screening: Not Applicable

## 2025-04-23 NOTE — TELEPHONE ENCOUNTER
Call received from pt to schedule pulmonary appt-appt scheduled 5/6/25 for full PFT's, appt scheduled with Lisette Sequeira NP for pulmonary follow-up on 5/9/25.     Pt has already been added to our wait list, but has concerns over how far out this appt is-says this cough has been extremely detrimental to her quality of sleep every night.   She reports that she used the Flonase X 2 weeks, but did not notice any difference. She is wondering if there are any other nasal sprays or medications that Rach could recommend for her to try until she is able to get in for these pulmonary appts?     Pt reports she has been having trouble accessing her [employer] account, would prefer a call back from Rach's office (preferably after 4 PM if possible).

## 2025-04-24 ENCOUNTER — NURSE TRIAGE (OUTPATIENT)
Dept: FAMILY MEDICINE | Facility: CLINIC | Age: 80
End: 2025-04-24

## 2025-04-24 ENCOUNTER — OFFICE VISIT (OUTPATIENT)
Dept: FAMILY MEDICINE | Facility: CLINIC | Age: 80
End: 2025-04-24
Payer: COMMERCIAL

## 2025-04-24 VITALS
WEIGHT: 148 LBS | OXYGEN SATURATION: 95 % | HEIGHT: 59 IN | SYSTOLIC BLOOD PRESSURE: 130 MMHG | DIASTOLIC BLOOD PRESSURE: 72 MMHG | RESPIRATION RATE: 18 BRPM | HEART RATE: 60 BPM | BODY MASS INDEX: 29.84 KG/M2 | TEMPERATURE: 99.1 F

## 2025-04-24 DIAGNOSIS — R05.1 ACUTE COUGH: Primary | ICD-10-CM

## 2025-04-24 DIAGNOSIS — Z23 NEED FOR VACCINATION: ICD-10-CM

## 2025-04-24 PROCEDURE — 3078F DIAST BP <80 MM HG: CPT | Performed by: NURSE PRACTITIONER

## 2025-04-24 PROCEDURE — 87635 SARS-COV-2 COVID-19 AMP PRB: CPT | Performed by: NURSE PRACTITIONER

## 2025-04-24 PROCEDURE — 99213 OFFICE O/P EST LOW 20 MIN: CPT | Performed by: NURSE PRACTITIONER

## 2025-04-24 PROCEDURE — 87798 DETECT AGENT NOS DNA AMP: CPT | Performed by: NURSE PRACTITIONER

## 2025-04-24 PROCEDURE — 3075F SYST BP GE 130 - 139MM HG: CPT | Performed by: NURSE PRACTITIONER

## 2025-04-24 RX ORDER — ALBUTEROL SULFATE 90 UG/1
2 INHALANT RESPIRATORY (INHALATION) EVERY 6 HOURS PRN
Qty: 18 G | Refills: 1 | Status: SHIPPED | OUTPATIENT
Start: 2025-04-24

## 2025-04-24 RX ORDER — BENZONATATE 200 MG/1
200 CAPSULE ORAL 3 TIMES DAILY PRN
Qty: 30 CAPSULE | Refills: 0 | Status: SHIPPED | OUTPATIENT
Start: 2025-04-24

## 2025-04-24 ASSESSMENT — ENCOUNTER SYMPTOMS: COUGH: 1

## 2025-04-24 NOTE — TELEPHONE ENCOUNTER
Nurse Triage SBAR    Is this a 2nd Level Triage? NO    Situation: Pt calling with symptoms.     Background: Pt states she has an ongoing cough. For the past 5 days, the cough is worse. Pt would like to be tested to whooping cough. Pt has been referred to pulmonology for cough.     Assessment: Pt states for the past 5 days, she is experiencing violent coughing spells. Pt cannot sleep at night due to cough. Denies breathing difficulty or wheezing.     Protocol Recommended Disposition:   See in Office Today or Tomorrow    Recommendation: OV scheduled. Pt will call if questions or symptoms.          Does the patient meet one of the following criteria for ADS visit consideration? No     Reason for Disposition   Continuous (nonstop) coughing interferes with work or school and no improvement using cough treatment per Care Advice    Additional Information   Negative: Bluish (or gray) lips or face   Negative: SEVERE difficulty breathing (e.g., struggling for each breath, speaks in single words)   Negative: Rapid onset of cough and has hives   Negative: Coughing started suddenly after medicine, an allergic food or bee sting   Negative: Difficulty breathing after exposure to flames, smoke, or fumes   Negative: Sounds like a life-threatening emergency to the triager   Negative: Previous asthma attacks and this feels like asthma attack   Negative: Dry cough (non-productive; no sputum or minimal clear sputum) and within 14 days of COVID-19 Exposure   Negative: MODERATE difficulty breathing (e.g., speaks in phrases, SOB even at rest, pulse 100-120) and still present when not coughing   Negative: Chest pain present when not coughing   Negative: Passed out (e.g., fainted, lost consciousness, blacked out and was not responding)   Negative: Patient sounds very sick or weak to the triager   Negative: MILD difficulty breathing (e.g., minimal/no SOB at rest, SOB with walking, pulse <100) and still present when not coughing   Negative:  "Coughed up > 1 tablespoon (15 ml) blood (Exception: Blood-tinged sputum.)   Negative: Fever > 103 F (39.4 C)   Negative: Fever > 101 F (38.3 C) and over 60 years of age   Negative: Fever > 100 F (37.8 C) and has diabetes mellitus or a weak immune system (e.g., HIV positive, cancer chemotherapy, organ transplant, splenectomy, chronic steroids)   Negative: Fever > 100 F (37.8 C) and bedridden (e.g., CVA, chronic illness, recovering from surgery)   Negative: Increasing ankle swelling   Negative: Wheezing is present   Negative: SEVERE coughing spells (e.g., whooping sound after coughing, vomiting after coughing)   Negative: Coughing up mia-colored (reddish-brown) or blood-tinged sputum   Negative: Fever present > 3 days (72 hours)   Negative: Fever returns after gone for over 24 hours and symptoms worse or not improved   Negative: Using nasal washes and pain medicine > 24 hours and sinus pain persists   Negative: Known COPD or other severe lung disease (i.e., bronchiectasis, cystic fibrosis, lung surgery) and symptoms getting worse (i.e., increased sputum purulence or amount, increased breathing difficulty)    Answer Assessment - Initial Assessment Questions  1. ONSET: \"When did the cough begin?\"       Ongoing cough, worse for the past 5 days  2. SEVERITY: \"How bad is the cough today?\"       Worse, pt states she is having violent coughing spells.  3. SPUTUM: \"Describe the color of your sputum\" (e.g., none, dry cough; clear, white, yellow, green)      clear  4. HEMOPTYSIS: \"Are you coughing up any blood?\" If Yes, ask: \"How much?\" (e.g., flecks, streaks, tablespoons, etc.)      no  5. DIFFICULTY BREATHING: \"Are you having difficulty breathing?\" If Yes, ask: \"How bad is it?\" (e.g., mild, moderate, severe)       no  6. FEVER: \"Do you have a fever?\" If Yes, ask: \"What is your temperature, how was it measured, and when did it start?\"      no  7. CARDIAC HISTORY: \"Do you have any history of heart disease?\" (e.g., heart " "attack, congestive heart failure)       no  8. LUNG HISTORY: \"Do you have any history of lung disease?\"  (e.g., pulmonary embolus, asthma, emphysema)      no  9. PE RISK FACTORS: \"Do you have a history of blood clots?\" (or: recent major surgery, recent prolonged travel, bedridden)      no  10. OTHER SYMPTOMS: \"Do you have any other symptoms?\" (e.g., runny nose, wheezing, chest pain)        none  11. PREGNANCY: \"Is there any chance you are pregnant?\" \"When was your last menstrual period?\"        N/a  12. TRAVEL: \"Have you traveled out of the country in the last month?\" (e.g., travel history, exposures)        no    Protocols used: Cough-A-OH    "

## 2025-04-24 NOTE — PROGRESS NOTES
"{PROVIDER CHARTING PREFERENCE:961877}    Subjective   Ariana is a 79 year old, presenting for the following health issues:  Cough (X5 days)  {(!) Visit Details have not yet been documented.  Please enter Visit Details and then use this list to pull in documentation. (Optional):676910}  Cough      Cough for 5 days. Patient has upcoming pulmonology appt on 5/3 .  Pt states for the past 5 days, she is experiencing violent coughing spells. Pt cannot sleep at night due to cough. Denies breathing difficulty or wheezing.     Patient has irritate throat-not sore.     Temp is slightly elevated here-was 95 F at home    Initially started with sneezing. Patient reports finds it difficult to stop coughing and can't breathe it gets so intense.     {MA/LPN/RN Pre-Provider Visit Orders- hCG/UA/Strep (Optional):465373}  {SUPERLIST (Optional):326106}  {additonal problems for provider to add (Optional):097186}    {ROS Picklists (Optional):557453}      Objective    /72 (BP Location: Right arm, Patient Position: Sitting, Cuff Size: Adult Regular)   Pulse 60   Temp 99.1  F (37.3  C) (Oral)   Resp 18   Ht 1.499 m (4' 11.02\")   Wt 67.1 kg (148 lb)   SpO2 95%   BMI 29.88 kg/m    Body mass index is 29.88 kg/m .  Physical Exam   {Exam List (Optional):361550}    {Diagnostic Test Results (Optional):977390}        Signed Electronically by: TUSHAR Thornton CNP  {Email feedback regarding this note to [email or web address]   :465493}  " tonsillar exudate. 1+ on the right. 1+ on the left.   Cardiovascular:      Rate and Rhythm: Normal rate and regular rhythm.      Heart sounds: Normal heart sounds.   Pulmonary:      Effort: Pulmonary effort is normal.      Breath sounds: Normal breath sounds.   Lymphadenopathy:      Cervical: No cervical adenopathy.   Neurological:      General: No focal deficit present.      Mental Status: She is alert and oriented to person, place, and time.   Psychiatric:         Mood and Affect: Mood normal.                    Signed Electronically by: TUSHAR Thornton CNP

## 2025-04-25 LAB
B PARAPERT DNA SPEC QL NAA+PROBE: NOT DETECTED
B PERT DNA SPEC QL NAA+PROBE: NOT DETECTED
SARS-COV-2 RNA RESP QL NAA+PROBE: NEGATIVE

## 2025-05-05 RX ORDER — ALBUTEROL SULFATE 0.83 MG/ML
2.5 SOLUTION RESPIRATORY (INHALATION) ONCE
Status: COMPLETED | OUTPATIENT
Start: 2025-05-06 | End: 2025-05-06

## 2025-05-06 ENCOUNTER — OFFICE VISIT (OUTPATIENT)
Dept: PULMONOLOGY | Facility: CLINIC | Age: 80
End: 2025-05-06
Attending: NURSE PRACTITIONER
Payer: COMMERCIAL

## 2025-05-06 ENCOUNTER — HOSPITAL ENCOUNTER (OUTPATIENT)
Dept: RESPIRATORY THERAPY | Facility: CLINIC | Age: 80
Discharge: HOME | End: 2025-05-06
Attending: NURSE PRACTITIONER | Admitting: NURSE PRACTITIONER
Payer: COMMERCIAL

## 2025-05-06 VITALS
WEIGHT: 148 LBS | DIASTOLIC BLOOD PRESSURE: 68 MMHG | OXYGEN SATURATION: 95 % | HEART RATE: 72 BPM | SYSTOLIC BLOOD PRESSURE: 134 MMHG | BODY MASS INDEX: 29.88 KG/M2

## 2025-05-06 DIAGNOSIS — R05.9 COUGH, UNSPECIFIED TYPE: ICD-10-CM

## 2025-05-06 DIAGNOSIS — R05.9 COUGH: Primary | ICD-10-CM

## 2025-05-06 LAB
DLCOCOR-%PRED-PRE: 108 %
DLCOCOR-PRE: 17.38 ML/MIN/MMHG
DLCOUNC-%PRED-PRE: 112 %
DLCOUNC-PRE: 18.13 ML/MIN/MMHG
DLCOUNC-PRED: 16.08 ML/MIN/MMHG
ERV-%PRED-PRE: 81 %
ERV-PRE: 0.55 L
ERV-PRED: 0.67 L
EXPTIME-PRE: 4.96 SEC
FEF2575-%PRED-POST: 103 %
FEF2575-%PRED-PRE: 117 %
FEF2575-POST: 1.38 L/SEC
FEF2575-PRE: 1.56 L/SEC
FEF2575-PRED: 1.33 L/SEC
FEFMAX-%PRED-PRE: 91 %
FEFMAX-PRE: 3.76 L/SEC
FEFMAX-PRED: 4.1 L/SEC
FEV1-%PRED-PRE: 108 %
FEV1-PRE: 1.66 L
FEV1FEV6-PRE: 79 %
FEV1FEV6-PRED: 78 %
FEV1FVC-PRE: 79 %
FEV1FVC-PRED: 79 %
FEV1SVC-PRE: 85 %
FEV1SVC-PRED: 63 %
FIFMAX-PRE: 2.71 L/SEC
FRCPLETH-%PRED-PRE: 104 %
FRCPLETH-PRE: 2.58 L
FRCPLETH-PRED: 2.46 L
FVC-%PRED-PRE: 107 %
FVC-PRE: 2.11 L
FVC-PRED: 1.96 L
HGB BLD-MCNC: 14.9 G/DL (ref 11.7–15.7)
IC-%PRED-PRE: 104 %
IC-PRE: 1.42 L
IC-PRED: 1.35 L
RVPLETH-%PRED-PRE: 102 %
RVPLETH-PRE: 2.04 L
RVPLETH-PRED: 1.98 L
TLCPLETH-%PRED-PRE: 97 %
TLCPLETH-PRE: 4 L
TLCPLETH-PRED: 4.1 L
VA-%PRED-PRE: 98 %
VA-PRE: 3.68 L
VC-%PRED-PRE: 80 %
VC-PRE: 1.96 L
VC-PRED: 2.44 L

## 2025-05-06 PROCEDURE — 99203 OFFICE O/P NEW LOW 30 MIN: CPT | Performed by: INTERNAL MEDICINE

## 2025-05-06 PROCEDURE — 250N000009 HC RX 250: Performed by: NURSE PRACTITIONER

## 2025-05-06 PROCEDURE — 94060 EVALUATION OF WHEEZING: CPT

## 2025-05-06 PROCEDURE — 999N000157 HC STATISTIC RCP TIME EA 10 MIN

## 2025-05-06 PROCEDURE — 36415 COLL VENOUS BLD VENIPUNCTURE: CPT | Performed by: NURSE PRACTITIONER

## 2025-05-06 PROCEDURE — 85018 HEMOGLOBIN: CPT | Performed by: NURSE PRACTITIONER

## 2025-05-06 PROCEDURE — 94726 PLETHYSMOGRAPHY LUNG VOLUMES: CPT

## 2025-05-06 PROCEDURE — 3075F SYST BP GE 130 - 139MM HG: CPT | Performed by: INTERNAL MEDICINE

## 2025-05-06 PROCEDURE — G2211 COMPLEX E/M VISIT ADD ON: HCPCS | Performed by: INTERNAL MEDICINE

## 2025-05-06 PROCEDURE — 94729 DIFFUSING CAPACITY: CPT

## 2025-05-06 PROCEDURE — 3078F DIAST BP <80 MM HG: CPT | Performed by: INTERNAL MEDICINE

## 2025-05-06 RX ORDER — ALBUTEROL SULFATE 0.63 MG/3ML
1 SOLUTION RESPIRATORY (INHALATION) EVERY 6 HOURS PRN
Qty: 90 ML | Refills: 5 | Status: SHIPPED | OUTPATIENT
Start: 2025-05-06

## 2025-05-06 RX ADMIN — ALBUTEROL SULFATE 2.5 MG: 2.5 SOLUTION RESPIRATORY (INHALATION) at 14:37

## 2025-05-06 NOTE — PROGRESS NOTES
RESPIRATORY CARE NOTE    Complete pft done.   The results of testing meet ATS criteria for acceptability & repeatability.  Pt was coughng quite a bit before, after and duirng tst as well as with Albuterol nebulizer. Good patient effort and cooperation.   Patient was given 2.5 mg albuterol neb.  Patient left PFT lab in no distress.  Snow Nichole, RT  5/6/2025

## 2025-05-06 NOTE — PROGRESS NOTES
CCx:Evaluation of a cough, clearing her throat    HPI:Ms. Hodge is a 79 year old lady with a past medical history significant for CHF, GERD, HTN, GERD and skin cancer  Amongst other medical issues presents to clinic before mentioned chief complaint.  She states that she developed a feels like a viral infection several weeks ago and has had a cough that is lasted for the last few weeks.  She has had negative testing for pertussis and COVID.  She has noted that since the onset of the symptoms her coughing has improved but endorses significant phlegm in her throat year-round.  She also has a runny nose but no itchy eyes.  She is able to cough up secretions that are mostly clear and states that using an albuterol inhaler makes it easier to cough up her secretions.  She denies chest pain or chest tightness other than when she coughs and states that she has been using albuterol every 6 hours to help with expectoration of secretions.  She does awaken earlier in the morning because of episodes of coughing.  She denies night sweats, shortness of breath, dyspnea on exertion and has occasional heartburn.  She states that she typically wakes up between 10 AM and noon and goes to bed after midnight.  She typically eats dinner between 6 and 9 PM and does not snack afterwards.  She uses 1 pillow under her head and denies PND or orthopnea.    ROS:  Pertinent positives alluded to in the HPI. Remainder of 10 point ROS is negative.     PMH:  CHF  GERD  HTN  GERD  Skin cancer    PSH:  ENT surgery  Breast reduction  Lumpectomy    Allergies:  Reviewed.     Family HX:  Reviewed.     Social Hx:  Marital status: Single ()  Number of children: 0  Resides in a town home built in 2002, no concern for mold.  Occupational history: [place]    service: No  Pets: 1 cat.  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 Also likes gardening.  Recent Travel: No    Current Meds:  Current Outpatient Medications   Medication Sig Dispense Refill    albuterol (PROAIR HFA/PROVENTIL HFA/VENTOLIN HFA) 108 (90 Base) MCG/ACT inhaler Inhale 2 puffs into the lungs every 6 hours as needed for shortness of breath, wheezing or cough. 18 g 1    anastrozole (ARIMIDEX) 1 MG tablet Take 1 tablet (1 mg) by mouth daily. 90 tablet 0    atorvastatin (LIPITOR) 40 MG tablet Take 1 tablet (40 mg) by mouth daily. 90 tablet 3    benzonatate (TESSALON) 200 MG capsule Take 1 capsule (200 mg) by mouth 3 times daily as needed for cough. 30 capsule 0    citalopram (CELEXA) 20 MG tablet Take 1 tablet (20 mg) by mouth daily. 90 tablet 3    dextromethorphan (DELSYM) 30 MG/5ML liquid Take 60 mg by mouth 2 times daily      hydrochlorothiazide (HYDRODIURIL) 25 MG tablet Take 1 tablet (25 mg) by mouth daily. 90 tablet 2    hydrocortisone 2.5 % cream As needed      ketoconazole (NIZORAL) 2 % external shampoo Apply topically daily as needed for itching or irritation. 120 mL 3    lidocaine (LIDODERM) 5 % patch Place 1 patch onto the skin every 24 hours. To prevent lidocaine toxicity, patient should be patch free for 12 hrs daily. 30 patch 3    olmesartan (BENICAR) 5 MG tablet Take 1 tablet (5 mg) by mouth daily. 90 tablet 1    S-Adenosylmethionine (MARY-E PO) Take 400 mg by mouth daily      fluticasone (FLONASE) 50 MCG/ACT nasal spray Spray 1 spray into both nostrils daily. (Patient not taking: Reported on 5/6/2025) 9.9 mL 1     Labs:  Reviewed.     Imaging studies:  No pertinent imaging to review.    PFT's 5/6/25:  FEV1/FVC is 79% and is normal.  FEV1 is 1.66L (108%)  (Z Score 0.50) predicted and is normal.  FVC is 2.11L (107%)  (Z Score 0.43) predicted and normal.  There  was no improvement in spirometry after a single inhaled dose of bronchodilator.  TLC is 4L (97%) (Z Score -0.17) predicted and is normal.  RV is 2.04L (102%)  (Z Score 0.15) predicted and is normal.  DLCO is 17.38ml/min/hg (108%)  (Z Score 0.44) predicted and is normal when it is corrected for hemoglobin.  Flow volume loops indicate no abnormalities.    Impression:  Full Pulmonary Function Test is normal.  PFTs are consistent with no obstructive disease.  Spirometry is not consistent with reversibility.  There  is no  hyperinflation.  There  is no  air-trapping.  Diffusion capacity when corrected for hemoglobin is normal.     The ATS criteria for acceptability and reproducibility was met.    Physical Exam:  /68   Pulse 72   Wt 67.1 kg (148 lb)   SpO2 95%   BMI 29.88 kg/m    GEN: NAD  HEENT: PERRL, No JVD  LUNGS: CTA BL  CVS: S1 & S2 no added sounds  ABD: No HSM, BS audible  EXT: No edema, no rashes  NEURO: AO*3 CN2-12 intact        Assessment and Plan:Ariana Hodge is a 79 year old with a past medical history significant for CHF, GERD, HTN, GERD and skin cancer who presents to clinic today for evaluation of a productive cough.  She has no chest imaging and pulmonary function testing appears to be unremarkable.  Based on her symptoms I suspect that she likely has cough variant asthma and may or may not have underlying bronchiectasis.  For the present we would recommend;    Likely reactive airways disease: As noted above.  Has relief of symptoms with an albuterol inhaler and has been using some over-the-counter flutter valve type device to bring up secretions.  I have given her a prescription for nebulized albuterol and also given her nebulizer machine with proper usage technique.  She has been instructed to use her flutter valve type device that she has purchased with the albuterol nebs to see if this alleviates her symptoms.  I will have her return to clinic in 4 weeks and if at this point she continues to have coughing but has improvement in her symptoms, we would have a low threshold to initiate her on an inhaled steroid at that time.  At this point in time  I do not feel like imaging is warranted.  Follow-up: 4 weeks with Aditi Ness CNP in 4 months with myself.      Marie James MD  Pulmonary and Critical Care  [facility]      The longitudinal plan of care for the diagnosis(es)/condition(s) as documented were addressed during this visit. Due to the added complexity in care, I will continue to support Ariana in the subsequent management and with ongoing continuity of care.

## 2025-05-06 NOTE — PROGRESS NOTES
RESPIRATORY CARE NOTE    Complete Pulmonary Function Test completed by patient.  Good patient effort and cooperation. Albuterol 2.5 mg neb given for bronchodilation.  The results of this test meet the ATS standards for acceptability and repeatability. PT returned to baseline and left in no distress.    Snow Nichole, RT  5/6/2025

## 2025-05-06 NOTE — NURSING NOTE
Patient instructed in use of nebulizer machine from [facility].  Patient states good understanding of how to use the nebulizer machine.  Nebulizer machine given to patient from [facility].  All paperwork signed and copy scanned to chart. Phone numbers given to patient to call if any questions.

## 2025-05-06 NOTE — PATIENT INSTRUCTIONS
Please use the albuterol with a nebulizer with the airway clearance device 2-3 times a day to help bring up secretions.  Please remember to use your sinus washes at least once to twice a day especially when you are having increased nasal discharge. We would also like to advise you not to use tap or bottle water for your sinus washes and only use distilled or boiled water for this purpose.

## 2025-06-25 ENCOUNTER — OFFICE VISIT (OUTPATIENT)
Dept: NEUROLOGY | Facility: CLINIC | Age: 80
End: 2025-06-25
Attending: NURSE PRACTITIONER
Payer: COMMERCIAL

## 2025-06-25 ENCOUNTER — OFFICE VISIT (OUTPATIENT)
Dept: PULMONOLOGY | Facility: CLINIC | Age: 80
End: 2025-06-25
Attending: INTERNAL MEDICINE
Payer: COMMERCIAL

## 2025-06-25 VITALS
SYSTOLIC BLOOD PRESSURE: 127 MMHG | WEIGHT: 147 LBS | OXYGEN SATURATION: 96 % | HEART RATE: 66 BPM | BODY MASS INDEX: 29.67 KG/M2 | DIASTOLIC BLOOD PRESSURE: 83 MMHG

## 2025-06-25 DIAGNOSIS — Z87.19 HX OF GASTROESOPHAGEAL REFLUX (GERD): ICD-10-CM

## 2025-06-25 DIAGNOSIS — R05.3 CHRONIC COUGH: Primary | ICD-10-CM

## 2025-06-25 DIAGNOSIS — G56.02 CARPAL TUNNEL SYNDROME OF LEFT WRIST: Primary | ICD-10-CM

## 2025-06-25 DIAGNOSIS — R20.0 NUMBNESS AND TINGLING OF BOTH LOWER EXTREMITIES: ICD-10-CM

## 2025-06-25 DIAGNOSIS — R09.89 THROAT CLEARING: ICD-10-CM

## 2025-06-25 DIAGNOSIS — R20.2 NUMBNESS AND TINGLING OF BOTH LOWER EXTREMITIES: ICD-10-CM

## 2025-06-25 PROCEDURE — 95913 NRV CNDJ TEST 13/> STUDIES: CPT | Performed by: PSYCHIATRY & NEUROLOGY

## 2025-06-25 PROCEDURE — 3074F SYST BP LT 130 MM HG: CPT | Performed by: NURSE PRACTITIONER

## 2025-06-25 PROCEDURE — 3079F DIAST BP 80-89 MM HG: CPT | Performed by: NURSE PRACTITIONER

## 2025-06-25 PROCEDURE — G2211 COMPLEX E/M VISIT ADD ON: HCPCS | Performed by: NURSE PRACTITIONER

## 2025-06-25 PROCEDURE — 99214 OFFICE O/P EST MOD 30 MIN: CPT | Performed by: NURSE PRACTITIONER

## 2025-06-25 PROCEDURE — 95886 MUSC TEST DONE W/N TEST COMP: CPT | Performed by: PSYCHIATRY & NEUROLOGY

## 2025-06-25 PROCEDURE — 99207 PR NO CHARGE NURSE ONLY: CPT | Performed by: PSYCHIATRY & NEUROLOGY

## 2025-06-25 RX ORDER — BUDESONIDE AND FORMOTEROL FUMARATE DIHYDRATE 160; 4.5 UG/1; UG/1
2 AEROSOL RESPIRATORY (INHALATION) 2 TIMES DAILY
Qty: 10.2 G | Refills: 3 | Status: SHIPPED | OUTPATIENT
Start: 2025-06-25

## 2025-06-25 NOTE — LETTER
6/25/2025      Ariana Hodge  [address] [place] [address]      Dear Colleague,    Thank you for referring your patient, Ariana Hodge, to the [facility] [place]. Please see a copy of my visit note below.    See EMG report    Again, thank you for allowing me to participate in the care of your patient.        Sincerely,        meena Shukla MD    Electronically signed

## 2025-06-25 NOTE — PATIENT INSTRUCTIONS
It was a pleasure seeing you in clinic today. This is what we discussed:    START the Symbicort 2 puffs twice daily, use this every day no matter what for at least 1 month. If no change you can stop use. Rinse and gargle after use.   We will get a CT of your chest to look at your airways and lung tissue.   If you are no better I recommend an esophagram, ENT evaluation, and further allergy work up.   Use your albuterol every 4 hours as needed for cough, shortness of breath, wheeze, or chest tightness.   Follow up with Dr. James as planned   If you have worsening symptoms, questions, or need to speak with the nurse please call [phone].

## 2025-06-25 NOTE — PROGRESS NOTES
Pulmonary Outpatient Clinic Visit  June 25, 2025      Assessment and Plan:  Ariana Hodge is a 79 year old with a past medical history significant for CHF, GERD, HTN, GERD and skin cancer who presents to clinic today for follow-up of a productive cough.    She has no chest imaging and pulmonary function testing appears to be unremarkable.  Based on her symptoms I suspect that she likely has cough variant asthma, underlying bronchiectasis, or upper airway cough syndrome secondary to reflux/sinus drainage.      #.  Chronic cough, ? reactive airways disease: As noted above. Has relief of symptoms with an albuterol inhaler and has been using some over-the-counter flutter valve type device to bring up secretions. Minimal improvement with scheduled albuterol nebs so we will initiate ICS/LABA and pursue further workup.  START Symbicort (160/4.5), 2 puffs twice daily. Can substitute for any medium to high dose ICS/LABA on formulary. Encouraged her to use this every day no matter what for at least 1 month. If not helpful can discontinue use. Rinse and gargle after use.  Continue albuterol nebs with flutter valve 1-2 times daily to help with bronchial hygiene.    CT chest to assess for bronchiectasis or airway abnormalities.  I will reach out with results.  #. Hx of GERD: Denies current symptoms and is adamant that this is not contributing to cough.  I am suspicious there is reflux happening given frequent throat clearing and worsening symptoms when laying down.  Consider PPI trial or esophagram  #. HCM: UTD with COVID-vaccine (10/2024), seasonal flu, PCV 13, PPSV23, RSV, and Tdap (2023)  Recommend she stay up-to-date with respiratory vaccines.    Follow-up: As planned with Dr. James in October    Aditi Ness, SOLOMON  Pulmonary Medicine  [facility]   [phone]      CCx:Evaluation of a cough, clearing her throat  Chief Complaint   Patient presents with    Follow Up     Cough      HPI:  Ariana presents today for  "follow-up.  She was last seen in clinic in 05/2025 by Dr. James for initial evaluation.  At that time her cough was thought to be related to possible bronchiectasis versus cough variant asthma.  She was initiated on scheduled albuterol nebs and flutter valve.  Today reports she is still having a lot of mucous and some cough. Will have a few \"coughing spasms\" during the day.     Symptoms initially started after a viral infection several weeks ago and has had a cough that is lasted for the last few weeks. She has had negative testing for pertussis and COVID.    She has noted that since the onset of the symptoms her coughing has improved but endorses significant phlegm in her throat year-round.    Has occasional nasal congestion.  Denies significant sinus pressure or postnasal drip.    Continues to have frequent throat clearing.    Denies wheeze or chest tightness.  States that using an albuterol inhaler makes it easier to cough up her secretions.  She does awaken earlier in the morning because of episodes of coughing.      Has occasional heartburn.  She states that she typically wakes up between 10 AM and noon and goes to bed after midnight.  She typically eats dinner between 6 and 9 PM and does not snack afterwards.      ROS:  Pertinent positives alluded to in the HPI. Remainder of 10 point ROS is negative.     PMH:  CHF  GERD  HTN  GERD  Skin cancer    PSH:  ENT surgery  Breast reduction  Lumpectomy    Allergies:  Allergies   Allergen Reactions    Cortisone      Orally causes Depression    Amiloride      *Unknown Patient does not recall every having this med     Family HX:  Reviewed.     Social Hx:  Marital status: Single ()  Number of children: 0  Resides in a town home built in 2002, no concern for mold.  Occupational history: [place]    service: No  Pets: 1 cat.  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  Recent Travel: No    Current Meds:  Current Outpatient Medications   Medication Sig Dispense Refill    albuterol (ACCUNEB) 0.63 MG/3ML neb solution Take 3 mLs (0.63 mg) by nebulization every 6 hours as needed for shortness of breath, wheezing or cough. 90 mL 5    albuterol (PROAIR HFA/PROVENTIL HFA/VENTOLIN HFA) 108 (90 Base) MCG/ACT inhaler Inhale 2 puffs into the lungs every 6 hours as needed for shortness of breath, wheezing or cough. 18 g 1    anastrozole (ARIMIDEX) 1 MG tablet Take 1 tablet (1 mg) by mouth daily. 90 tablet 0    atorvastatin (LIPITOR) 40 MG tablet Take 1 tablet (40 mg) by mouth daily. 90 tablet 3    benzonatate (TESSALON) 200 MG capsule Take 1 capsule (200 mg) by mouth 3 times daily as needed for cough. (Patient not taking: Reported on 6/25/2025) 30 capsule 0    citalopram (CELEXA) 20 MG tablet Take 1 tablet (20 mg) by mouth daily. 90 tablet 3    dextromethorphan (DELSYM) 30 MG/5ML liquid Take 60 mg by mouth 2 times daily      fluticasone (FLONASE) 50 MCG/ACT nasal spray Spray 1 spray into both nostrils daily. (Patient not taking: Reported on 5/6/2025) 9.9 mL 1    hydrochlorothiazide (HYDRODIURIL) 25 MG tablet Take 1 tablet (25 mg) by mouth daily. 90 tablet 2    hydrocortisone 2.5 % cream As needed      ketoconazole (NIZORAL) 2 % external shampoo Apply topically daily as needed for itching or irritation. 120 mL 3    lidocaine (LIDODERM) 5 % patch Place 1 patch onto the skin every 24 hours. To prevent lidocaine toxicity, patient should be patch free for 12 hrs daily. 30 patch 3    olmesartan (BENICAR) 5 MG tablet Take 1 tablet (5 mg) by mouth daily. 90 tablet 1    S-Adenosylmethionine (MARY-E PO) Take 400 mg by mouth daily       Labs:  Reviewed.     Imaging studies:  No pertinent imaging to review.    PFT's 5/6/25:  FEV1/FVC is 79% and is normal.  FEV1 is 1.66L (108%)  (Z Score 0.50) predicted and is  normal.  FVC is 2.11L (107%)  (Z Score 0.43) predicted and normal.  There  was no improvement in spirometry after a single inhaled dose of bronchodilator.  TLC is 4L (97%) (Z Score -0.17) predicted and is normal.  RV is 2.04L (102%) (Z Score 0.15) predicted and is normal.  DLCO is 17.38ml/min/hg (108%)  (Z Score 0.44) predicted and is normal when it is corrected for hemoglobin.  Flow volume loops indicate no abnormalities.    Impression:  Full Pulmonary Function Test is normal.  PFTs are consistent with no obstructive disease.  Spirometry is not consistent with reversibility.  There  is no  hyperinflation.  There  is no  air-trapping.  Diffusion capacity when corrected for hemoglobin is normal.       Physical Exam:  /83   Pulse 66   Wt 66.7 kg (147 lb)   SpO2 96%   BMI 29.67 kg/m      Physical Exam  Constitutional:       General: She is not in acute distress.     Appearance: She is not ill-appearing.   HENT:      Mouth/Throat:      Comments: Frequent throat clearing during visit   Cardiovascular:      Rate and Rhythm: Normal rate and regular rhythm.      Heart sounds: Normal heart sounds.   Pulmonary:      Effort: Pulmonary effort is normal. No respiratory distress.      Breath sounds: No wheezing or rhonchi.      Comments: Harsh, hacking cough during visit  Neurological:      Mental Status: She is alert.   Psychiatric:         Behavior: Behavior normal.

## 2025-06-25 NOTE — PROCEDURES
[facility]    [facility]ly [facility]  [address], [address]  [place] [address]  Tel: [phone]  Fax: [phone]          Full Name: Ariana Hodge Gender: Female  Patient ID: [id number] YOB: 1945      Visit Date: 6/25/2025 13:16  Age: 79 Years  Interpreted By: Vishnu Shukla MD   Ref Dr.: Rach Hernandez NP  Tech: ST   Height: 4 feet 11 inch  Reason for referral: Evaluate bilateral uppers/left lower. c/o pain, numbness, tingling in arms/legs > 2 months. Lef t. > Right.       Motor NCS      Nerve / Sites Lat Amp Dist Andriy    ms mV cm m/s   L Median - APB      Wrist 5.04 10.3 7       Elbow 9.08 9.3 20.5 51   R Median - APB      Wrist 3.79 8.6 722       Elbow 7.50 7.6 22 59   L Ulnar - ADM      Wrist 2.75 10.7 7       B.Elbow 4.73 10.0 13.5 68      A.Elbow 6.90 10.1 14 65   R Ulnar - ADM      Wrist 2.54 10.6 7       B.Elbow 4.90 9.9 14 59      A.Elbow 7.19 9.4 13 57   L Peroneal - EDB      Ankle 4.27 4.9 8       Fib head 10.31 4.1 26 43      Pop fossa 12.33 3.9 10 49   L Tibial - AH      Ankle 3.75 14.7 8       Pop fossa 11.50 13.1 34 44       F  Wave      Nerve Fmin    ms   L Ulnar - ADM 23.96   L Tibial - AH 49.53   R Ulnar - ADM 24.79       Sensory NCS      Nerve / Sites Onset Lat Pk Lat Amp.2-3 Dist Andriy Lat Diff    ms ms  V cm m/s ms   L Median - II (Antidr)      Wrist 3.44 4.33 25.0 13 38    R Median - II (Antidr)      Wrist 2.58 3.44 24.9 13 50    L Ulnar - V (Antidr)      Wrist 2.15 3.13 24.9 11 51    R Ulnar - V (Antidr)      Wrist 2.19 3.25 7.8 11 50    L Median, Ulnar - Transcarpal comparison      Median Palm 2.38 3.06 48.4 8 34       Ulnar Palm 1.48 2.10 22.7 8 54          0.96   R Median, Ulnar - Transcarpal comparison      Median Palm 1.54 2.23 40.8 8 52       Ulnar Palm 1.38 1.83 13.1 8 58          0.40   L Sural - Ankle (Calf)      Calf 2.54 3.10 10.7 14 55    L Superficial peroneal - Ankle      Lat leg 2.83 3.71 7.2 12  42        EMG Summary Table     Spontaneous MUAP Rcmt Note   Muscle Fib PSW Fasc IA # Amp Dur PPP Rate Type   L. Gluteus medius None None None N N N N N N N   L. Gluteus pritesh None None None N N N N N N N   L. L3 paraspinal None None None N N N N N N N   L. L4 paraspinal None None None N N N N N N N   L. L5 paraspinal None None None N N N N N N N   L. S1 paraspinal None None None N N N N N N N   L. Adductor cheyenne None None None N N N N N N N   L. Quadriceps None None None N N N N N N N   L. Tibialis anterior None None None N N N N N N N   L. Gastrocnemius (Medial head) None None None N N N N N N N   L. Brachioradialis None None None N N N N N N CRD   L. Pronator teres None None None N N N N N N N   L. Biceps brachii None None None N N N N N N N   L. Deltoid None None None N N N N N N N   L. Triceps brachii None None None N N N N N N N   L. Extensor digitorum communis None None None N N N N N N N   L. Flexor carpi ulnaris None None None N N N N N N N   L. First dorsal interosseous None None None N N N N N N N   L. Abductor pollicis brevis None None None N Sl Red Incr Incr N N N   R. Brachioradialis None None None N N N N N N N   R. Pronator teres None None None N N N N N N N   R. Biceps brachii None None None N N N N N N N   R. Deltoid None None None N N N N N N N   R. Triceps brachii None None None N N N N N N N   R. Flexor carpi ulnaris None None None N N N N N N N   R. First dorsal interosseous None None None N N N N N N N   R. Abductor pollicis brevis None None None N N N N N N N       Left median motor conduction study prolonged distal latency 5.04 ms  Left median snap potential prolonged 4.33 ms  Left median palmar latency prolonged 3.06 ms    Left ulnar motor conduction study normal  Left ulnar F-wave latency normal  Left ulnar snap potential normal  Left ulnar palmar latency normal 2.1 ms    Right median motor conduction study normal  Right median snap potential normal  Right median palmar latency  normal    Right ulnar motor conduction study normal  Right ulnar F-wave latency normal  Right ulnar snap potential normal  Right ulnar palmar latency normal    Left peroneal motor conduction study normal  Left tibial motor conduction study normal  Left tibial F-wave latency normal    Left sural snap potential normal  Left superficial peroneal snap potential normal    Needle examination left lower extremity no active or chronic denervation, normal study  Needle examination of the right upper extremity no evidence of active or chronic denervation, normal study  Needle examination left upper extremity mild chronic motor changes in the left abductor pollicis brevis  The rest the needle examination of the left upper extremity is normal.      Impression    1.  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  No evidence of a left lower extremity motor radiculopathy.    3.  No evidence of a sensorimotor polyneuropathy    4.  No evidence of a cervical motor radiculopathy in either the right or left upper extremities.

## 2025-06-26 ENCOUNTER — RESULTS FOLLOW-UP (OUTPATIENT)
Dept: FAMILY MEDICINE | Facility: CLINIC | Age: 80
End: 2025-06-26

## 2025-06-26 DIAGNOSIS — G56.02 CARPAL TUNNEL SYNDROME OF LEFT WRIST: Primary | ICD-10-CM

## 2025-07-14 ENCOUNTER — HOSPITAL ENCOUNTER (OUTPATIENT)
Dept: CT IMAGING | Facility: CLINIC | Age: 80
Discharge: HOME | End: 2025-07-14
Attending: NURSE PRACTITIONER | Admitting: NURSE PRACTITIONER
Payer: COMMERCIAL

## 2025-07-14 ENCOUNTER — PATIENT OUTREACH (OUTPATIENT)
Dept: CARE COORDINATION | Facility: CLINIC | Age: 80
End: 2025-07-14
Payer: COMMERCIAL

## 2025-07-14 DIAGNOSIS — R05.3 CHRONIC COUGH: ICD-10-CM

## 2025-07-14 PROCEDURE — 71250 CT THORAX DX C-: CPT

## 2025-08-04 ENCOUNTER — PATIENT OUTREACH (OUTPATIENT)
Dept: ONCOLOGY | Facility: HOSPITAL | Age: 80
End: 2025-08-04
Payer: COMMERCIAL

## 2025-08-04 DIAGNOSIS — C50.912 INVASIVE DUCTAL CARCINOMA OF BREAST, STAGE 1, LEFT (H): ICD-10-CM

## 2025-08-04 RX ORDER — ANASTROZOLE 1 MG/1
1 TABLET ORAL DAILY
Qty: 90 TABLET | Refills: 1 | Status: SHIPPED | OUTPATIENT
Start: 2025-08-04

## 2025-08-18 ENCOUNTER — HOSPITAL ENCOUNTER (OUTPATIENT)
Dept: MAMMOGRAPHY | Facility: CLINIC | Age: 80
Discharge: HOME | End: 2025-08-18
Attending: NURSE PRACTITIONER | Admitting: NURSE PRACTITIONER
Payer: COMMERCIAL

## 2025-08-18 DIAGNOSIS — Z12.31 VISIT FOR SCREENING MAMMOGRAM: ICD-10-CM

## 2025-08-18 PROCEDURE — 77063 BREAST TOMOSYNTHESIS BI: CPT

## 2025-09-02 DIAGNOSIS — E78.5 DYSLIPIDEMIA: ICD-10-CM

## 2025-09-03 RX ORDER — ATORVASTATIN CALCIUM 40 MG/1
40 TABLET, FILM COATED ORAL DAILY
Qty: 90 TABLET | Refills: 2 | Status: SHIPPED | OUTPATIENT
Start: 2025-09-03